# Patient Record
Sex: FEMALE | Race: WHITE | NOT HISPANIC OR LATINO | Employment: OTHER | URBAN - METROPOLITAN AREA
[De-identification: names, ages, dates, MRNs, and addresses within clinical notes are randomized per-mention and may not be internally consistent; named-entity substitution may affect disease eponyms.]

---

## 2023-03-01 LAB
ALANINE AMINOTRANSFERASE (SGPT) (U/L) IN SER/PLAS: 25 U/L (ref 7–45)
ALBUMIN (G/DL) IN SER/PLAS: 4.2 G/DL (ref 3.4–5)
ALKALINE PHOSPHATASE (U/L) IN SER/PLAS: 35 U/L (ref 33–136)
ANION GAP IN SER/PLAS: 14 MMOL/L (ref 10–20)
ASPARTATE AMINOTRANSFERASE (SGOT) (U/L) IN SER/PLAS: 23 U/L (ref 9–39)
BILIRUBIN TOTAL (MG/DL) IN SER/PLAS: 0.4 MG/DL (ref 0–1.2)
CALCIUM (MG/DL) IN SER/PLAS: 9.8 MG/DL (ref 8.6–10.6)
CARBON DIOXIDE, TOTAL (MMOL/L) IN SER/PLAS: 30 MMOL/L (ref 21–32)
CHLORIDE (MMOL/L) IN SER/PLAS: 104 MMOL/L (ref 98–107)
CHOLESTEROL (MG/DL) IN SER/PLAS: 125 MG/DL (ref 0–199)
CHOLESTEROL IN HDL (MG/DL) IN SER/PLAS: 49.4 MG/DL
CHOLESTEROL/HDL RATIO: 2.5
CREATININE (MG/DL) IN SER/PLAS: 1.48 MG/DL (ref 0.5–1.05)
ESTIMATED AVERAGE GLUCOSE FOR HBA1C: 114 MG/DL
GFR FEMALE: 37 ML/MIN/1.73M2
GLUCOSE (MG/DL) IN SER/PLAS: 96 MG/DL (ref 74–99)
HEMOGLOBIN A1C/HEMOGLOBIN TOTAL IN BLOOD: 5.6 %
LDL: 51 MG/DL (ref 0–99)
POTASSIUM (MMOL/L) IN SER/PLAS: 4 MMOL/L (ref 3.5–5.3)
PROTEIN TOTAL: 6.7 G/DL (ref 6.4–8.2)
SODIUM (MMOL/L) IN SER/PLAS: 144 MMOL/L (ref 136–145)
THYROTROPIN (MIU/L) IN SER/PLAS BY DETECTION LIMIT <= 0.05 MIU/L: 0.1 MIU/L (ref 0.44–3.98)
THYROXINE (T4) FREE (NG/DL) IN SER/PLAS: 1.57 NG/DL (ref 0.78–1.48)
TRIGLYCERIDE (MG/DL) IN SER/PLAS: 121 MG/DL (ref 0–149)
UREA NITROGEN (MG/DL) IN SER/PLAS: 21 MG/DL (ref 6–23)
VLDL: 24 MG/DL (ref 0–40)

## 2023-04-04 ENCOUNTER — TELEPHONE (OUTPATIENT)
Dept: PRIMARY CARE | Facility: CLINIC | Age: 73
End: 2023-04-04
Payer: MEDICARE

## 2023-04-04 PROBLEM — Z85.850 HISTORY OF THYROID CANCER: Status: ACTIVE | Noted: 2023-04-04

## 2023-04-04 PROBLEM — F41.9 ANXIETY: Status: ACTIVE | Noted: 2023-04-04

## 2023-04-04 PROBLEM — L03.213 PRESEPTAL CELLULITIS OF LEFT LOWER EYELID: Status: ACTIVE | Noted: 2023-04-04

## 2023-04-04 PROBLEM — H52.4 HYPEROPIA WITH PRESBYOPIA OF BOTH EYES: Status: ACTIVE | Noted: 2023-04-04

## 2023-04-04 PROBLEM — N95.1 HOT FLASH, MENOPAUSAL: Status: ACTIVE | Noted: 2023-04-04

## 2023-04-04 PROBLEM — Z86.010 HISTORY OF COLONIC POLYPS: Status: ACTIVE | Noted: 2023-04-04

## 2023-04-04 PROBLEM — F32.5 DEPRESSION, MAJOR, IN REMISSION (CMS-HCC): Status: ACTIVE | Noted: 2023-04-04

## 2023-04-04 PROBLEM — H52.4 MYOPIA WITH PRESBYOPIA: Status: ACTIVE | Noted: 2023-04-04

## 2023-04-04 PROBLEM — R05.9 COUGH: Status: ACTIVE | Noted: 2023-04-04

## 2023-04-04 PROBLEM — H61.23 IMPACTED CERUMEN OF BOTH EARS: Status: ACTIVE | Noted: 2023-04-04

## 2023-04-04 PROBLEM — I10 BENIGN ESSENTIAL HYPERTENSION: Status: ACTIVE | Noted: 2023-04-04

## 2023-04-04 PROBLEM — N95.2 VAGINAL ATROPHY: Status: ACTIVE | Noted: 2023-04-04

## 2023-04-04 PROBLEM — F32.A DEPRESSION: Status: ACTIVE | Noted: 2023-04-04

## 2023-04-04 PROBLEM — N18.30 CKD (CHRONIC KIDNEY DISEASE), STAGE III (MULTI): Status: ACTIVE | Noted: 2023-04-04

## 2023-04-04 PROBLEM — M85.80 OSTEOPENIA: Status: ACTIVE | Noted: 2023-04-04

## 2023-04-04 PROBLEM — I25.10 CORONARY ATHEROSCLEROSIS: Status: ACTIVE | Noted: 2023-04-04

## 2023-04-04 PROBLEM — E66.811 CLASS 1 OBESITY WITH BODY MASS INDEX (BMI) OF 30.0 TO 30.9 IN ADULT: Status: ACTIVE | Noted: 2023-04-04

## 2023-04-04 PROBLEM — H01.009 BLEPHARITIS: Status: ACTIVE | Noted: 2023-04-04

## 2023-04-04 PROBLEM — L08.9 INFECTED FINGER: Status: ACTIVE | Noted: 2023-04-04

## 2023-04-04 PROBLEM — E78.00 HYPERCHOLESTEROLEMIA: Status: ACTIVE | Noted: 2023-04-04

## 2023-04-04 PROBLEM — H52.4 HYPEROPIA WITH ASTIGMATISM AND PRESBYOPIA: Status: ACTIVE | Noted: 2023-04-04

## 2023-04-04 PROBLEM — L03.213 PRESEPTAL CELLULITIS OF LEFT UPPER EYELID: Status: ACTIVE | Noted: 2023-04-04

## 2023-04-04 PROBLEM — H52.203 HYPEROPIA OF BOTH EYES WITH ASTIGMATISM AND PRESBYOPIA: Status: ACTIVE | Noted: 2023-04-04

## 2023-04-04 PROBLEM — D24.1 INTRADUCTAL PAPILLOMA OF RIGHT BREAST: Status: ACTIVE | Noted: 2023-04-04

## 2023-04-04 PROBLEM — C50.912 CARCINOMA OF LEFT BREAST METASTATIC TO AXILLARY LYMPH NODE (MULTI): Status: ACTIVE | Noted: 2023-04-04

## 2023-04-04 PROBLEM — Z86.0100 HISTORY OF COLONIC POLYPS: Status: ACTIVE | Noted: 2023-04-04

## 2023-04-04 PROBLEM — N28.9 RENAL INSUFFICIENCY: Status: ACTIVE | Noted: 2023-04-04

## 2023-04-04 PROBLEM — E66.9 CLASS 1 OBESITY WITH BODY MASS INDEX (BMI) OF 30.0 TO 30.9 IN ADULT: Status: ACTIVE | Noted: 2023-04-04

## 2023-04-04 PROBLEM — C54.1 ENDOMETRIAL CANCER (MULTI): Status: ACTIVE | Noted: 2023-04-04

## 2023-04-04 PROBLEM — H52.209 HYPEROPIA WITH ASTIGMATISM AND PRESBYOPIA: Status: ACTIVE | Noted: 2023-04-04

## 2023-04-04 PROBLEM — H35.369 PERIPHERAL DRUSEN: Status: ACTIVE | Noted: 2023-04-04

## 2023-04-04 PROBLEM — H52.4 HYPEROPIA OF BOTH EYES WITH ASTIGMATISM AND PRESBYOPIA: Status: ACTIVE | Noted: 2023-04-04

## 2023-04-04 PROBLEM — N89.8 VAGINAL DISCHARGE: Status: ACTIVE | Noted: 2023-04-04

## 2023-04-04 PROBLEM — R30.0 DYSURIA: Status: ACTIVE | Noted: 2023-04-04

## 2023-04-04 PROBLEM — R07.9 CHEST PAIN: Status: ACTIVE | Noted: 2023-04-04

## 2023-04-04 PROBLEM — G43.909 MIGRAINE HEADACHE: Status: ACTIVE | Noted: 2023-04-04

## 2023-04-04 PROBLEM — K21.9 GERD (GASTROESOPHAGEAL REFLUX DISEASE): Status: ACTIVE | Noted: 2023-04-04

## 2023-04-04 PROBLEM — H52.03 HYPEROPIA OF BOTH EYES WITH ASTIGMATISM AND PRESBYOPIA: Status: ACTIVE | Noted: 2023-04-04

## 2023-04-04 PROBLEM — H25.13 NUCLEAR SCLEROSIS OF BOTH EYES: Status: ACTIVE | Noted: 2023-04-04

## 2023-04-04 PROBLEM — G89.29 CHRONIC PAIN: Status: ACTIVE | Noted: 2023-04-04

## 2023-04-04 PROBLEM — H61.20 CERUMEN IMPACTION: Status: ACTIVE | Noted: 2023-04-04

## 2023-04-04 PROBLEM — H52.00 HYPEROPIA WITH ASTIGMATISM AND PRESBYOPIA: Status: ACTIVE | Noted: 2023-04-04

## 2023-04-04 PROBLEM — S29.9XXA RIB INJURY: Status: ACTIVE | Noted: 2023-04-04

## 2023-04-04 PROBLEM — L03.90 CELLULITIS: Status: ACTIVE | Noted: 2023-04-04

## 2023-04-04 PROBLEM — H16.149 SPK (SUPERFICIAL PUNCTATE KERATITIS): Status: ACTIVE | Noted: 2023-04-04

## 2023-04-04 PROBLEM — C77.3 CARCINOMA OF LEFT BREAST METASTATIC TO AXILLARY LYMPH NODE (MULTI): Status: ACTIVE | Noted: 2023-04-04

## 2023-04-04 PROBLEM — B00.9 HERPES SIMPLEX: Status: ACTIVE | Noted: 2023-04-04

## 2023-04-04 PROBLEM — G47.00 INSOMNIA: Status: ACTIVE | Noted: 2023-04-04

## 2023-04-04 PROBLEM — R55 SYNCOPE: Status: ACTIVE | Noted: 2023-04-04

## 2023-04-04 PROBLEM — J18.9 PNEUMONIA: Status: ACTIVE | Noted: 2023-04-04

## 2023-04-04 PROBLEM — R53.83 FATIGUE: Status: ACTIVE | Noted: 2023-04-04

## 2023-04-04 PROBLEM — H35.361 DRUSEN (DEGENERATIVE) OF MACULA, RIGHT EYE: Status: ACTIVE | Noted: 2023-04-04

## 2023-04-04 PROBLEM — Z85.42 HISTORY OF ENDOMETRIAL CANCER: Status: ACTIVE | Noted: 2023-04-04

## 2023-04-04 PROBLEM — H52.03 HYPEROPIA WITH PRESBYOPIA OF BOTH EYES: Status: ACTIVE | Noted: 2023-04-04

## 2023-04-04 PROBLEM — H52.10 MYOPIA WITH PRESBYOPIA: Status: ACTIVE | Noted: 2023-04-04

## 2023-04-04 PROBLEM — E03.9 HYPOTHYROIDISM: Status: ACTIVE | Noted: 2023-04-04

## 2023-04-04 PROBLEM — N82.4 COLOVAGINAL FISTULA: Status: ACTIVE | Noted: 2023-04-04

## 2023-04-04 PROBLEM — H00.026 INTERNAL HORDEOLUM OF LEFT EYE: Status: ACTIVE | Noted: 2023-04-04

## 2023-04-04 PROBLEM — L85.3 DRY SKIN: Status: ACTIVE | Noted: 2023-04-04

## 2023-04-04 PROBLEM — E55.9 VITAMIN D DEFICIENCY: Status: ACTIVE | Noted: 2023-04-04

## 2023-04-04 PROBLEM — R41.3 MEMORY LOSS: Status: ACTIVE | Noted: 2023-04-04

## 2023-04-04 RX ORDER — VENLAFAXINE HYDROCHLORIDE 75 MG/1
1 CAPSULE, EXTENDED RELEASE ORAL DAILY
COMMUNITY
Start: 2023-02-21 | End: 2024-03-08 | Stop reason: SDUPTHER

## 2023-04-04 RX ORDER — CHOLECALCIFEROL (VITAMIN D3) 125 MCG
1 CAPSULE ORAL DAILY
COMMUNITY

## 2023-04-04 RX ORDER — ALPRAZOLAM 0.25 MG/1
TABLET ORAL
COMMUNITY
Start: 2021-02-03 | End: 2023-10-30 | Stop reason: ALTCHOICE

## 2023-04-04 RX ORDER — OMEPRAZOLE 20 MG/1
1 CAPSULE, DELAYED RELEASE ORAL DAILY
COMMUNITY
Start: 2013-05-28 | End: 2023-12-04 | Stop reason: SDUPTHER

## 2023-04-04 RX ORDER — FLUOROURACIL 50 MG/G
CREAM TOPICAL 2 TIMES DAILY
COMMUNITY
Start: 2019-03-08 | End: 2024-03-27 | Stop reason: ALTCHOICE

## 2023-04-04 RX ORDER — VALSARTAN AND HYDROCHLOROTHIAZIDE 160; 12.5 MG/1; MG/1
0.5 TABLET, FILM COATED ORAL DAILY
COMMUNITY
Start: 2017-08-09 | End: 2023-10-30 | Stop reason: ALTCHOICE

## 2023-04-04 RX ORDER — CALCIUM CARBONATE 600 MG
2 TABLET ORAL DAILY
COMMUNITY
Start: 2019-07-03

## 2023-04-04 RX ORDER — TRAMADOL HYDROCHLORIDE 50 MG/1
1 TABLET ORAL 3 TIMES DAILY PRN
COMMUNITY
Start: 2022-09-15 | End: 2024-03-27 | Stop reason: ALTCHOICE

## 2023-04-04 RX ORDER — ZOLEDRONIC ACID 0.04 MG/ML
INJECTION, SOLUTION INTRAVENOUS
COMMUNITY
Start: 2021-01-22 | End: 2024-03-05 | Stop reason: WASHOUT

## 2023-04-04 RX ORDER — ZINC GLUCONATE 50 MG
1 TABLET ORAL DAILY
COMMUNITY

## 2023-04-04 RX ORDER — LEVOTHYROXINE SODIUM 112 UG/1
1 TABLET ORAL DAILY
COMMUNITY
Start: 2023-03-31 | End: 2023-06-30 | Stop reason: SDUPTHER

## 2023-04-04 RX ORDER — POTASSIUM CHLORIDE 750 MG/1
1 CAPSULE, EXTENDED RELEASE ORAL DAILY
COMMUNITY
Start: 2022-06-23 | End: 2023-12-01

## 2023-04-04 RX ORDER — MAGNESIUM GLUCONATE 27.5 (500)
1 TABLET ORAL DAILY
COMMUNITY
Start: 2021-02-03

## 2023-04-04 RX ORDER — FLUOCINOLONE ACETONIDE 0.11 MG/ML
OIL TOPICAL 2 TIMES DAILY
COMMUNITY
Start: 2015-02-23 | End: 2024-03-27 | Stop reason: ALTCHOICE

## 2023-04-04 RX ORDER — SEMAGLUTIDE 1.34 MG/ML
INJECTION, SOLUTION SUBCUTANEOUS
COMMUNITY
End: 2024-03-27 | Stop reason: ALTCHOICE

## 2023-04-04 RX ORDER — ROSUVASTATIN CALCIUM 40 MG/1
1 TABLET, COATED ORAL NIGHTLY
COMMUNITY
Start: 2019-10-14 | End: 2024-05-07 | Stop reason: SDUPTHER

## 2023-04-04 RX ORDER — ASPIRIN 81 MG/1
1 TABLET ORAL DAILY
COMMUNITY
End: 2023-11-08 | Stop reason: ALTCHOICE

## 2023-04-04 RX ORDER — TAMOXIFEN CITRATE 20 MG/1
1 TABLET ORAL DAILY
COMMUNITY
End: 2023-10-30 | Stop reason: SDUPTHER

## 2023-04-04 RX ORDER — MULTIVIT-MIN/IRON/FOLIC ACID/K 18-600-40
1 CAPSULE ORAL 2 TIMES DAILY
COMMUNITY
Start: 2021-02-03

## 2023-04-04 RX ORDER — PERPHENAZINE 2 MG
1 TABLET ORAL DAILY
COMMUNITY
Start: 2019-12-06 | End: 2023-10-30 | Stop reason: ALTCHOICE

## 2023-04-04 NOTE — TELEPHONE ENCOUNTER
Dizziness which seemed to start when she started Ozempic    She stopped Ozempic 5 days ago and is not doing any better  Also BP meds were decreased  as BP was low but this do did not seem to make a difference  Will schedule an appointment  to evaluate

## 2023-04-04 NOTE — TELEPHONE ENCOUNTER
Pt was on Ozempic and was having side effects so she stopped it and wants to discuss with you. Please call

## 2023-04-04 NOTE — PROGRESS NOTES
Subjective   Patient ID: Annabel Cottrell is a 72 y.o. female.    HPI  Patient presents today in follow-up of dizziness and lightheadedness  She is 72 years old and has multiple medical issues  Metastatic breast cancer  History of thyroid cancer with subsequent hypothyroidism  History of endometrial cancer  Chronic kidney disease  Hypertension  Hypercholesteremia  Anxiety  She had  really been struggling with her weight and was very frustrated with this.  Started Ozempic and she has had some success with the Ozempic  However since starting the Ozempic she had felt somewhat lightheaded and seems it is getting worse it does seem to be somewhat vertiginous in nature with the room spinning  She also was concerned that her blood pressure was too low as her blood pressure had dropped after losing weight.  Dr. Coulter made some adjustments with her medications  She stopped amlodipine and taking 1/2 of valsartan at this time  She also stopped the Ozempic  She has been vomiting intermittently but in the last 2 weeks almost daily since stopping the Ozempic this has improved slightly but did vomit yesterday  She has not noted much of a change in how she is feeling since stopping Ozempic or with the blood pressure medication changes  She has lost 21 lbs on the Ozempic  It is positional in nature            Review of Systems  As above she has lost 21 pounds since starting the Ozempic in December    Objective   Physical Exam  Well-developed age-appropriate no acute distress  HEENT exam is really unremarkable however with turning her head left and right the symptoms of vertigo present also when she is up from lying down to sitting position her symptoms are also reproduced  Otherwise cranial nerves II through XII are intact without deficits noted  Lungs are clear  Cardiovascular regular rate and rhythm  Abdomen is soft and nontender bowel sounds are positive    Assessment/Plan   #1 dizziness which I think is a combination of the  orthostasis as well as vertigo.  Her blood pressure is low and definitely changes with positional changes she is currently only taking the valsartan her blood pressure at home has been right around 100 systolic as well at this time we will stop the valsartan.  This is the only thing she is taking for blood pressure.  We will obtain blood work including comprehensive metabolic profile and CBC  Increase fluid intake  2.  Vertigo she does have symptoms of positional vertigo referral to physical therapy able to get her in within the next week which is excellent  3.  Nausea vomiting seems to be improving since she stopped the Ozempic I think it really may be the Ozempic she has had GI issues throughout with Ozempic but really so wanted to lose weight that she sided to tolerate the side effects currently her weight is fine and I do not want her to take the Ozempic any longer  4.  Chronic kidney disease kidney functions creatinine was slightly higher than baseline at last value in early March we will recheck today  30 minutes were spent with patient of which greater than 50% was spent in counseling and coordination of care   This note was partially generated using the Dragon voice recognition system.  There may be some incorrect wording ,grammar, spelling or punctuation errors that were not corrected prior to committing the note to the medical record.      Lab on 04/05/2023   Component Date Value Ref Range Status    Glucose 04/05/2023 99  74 - 99 mg/dL Final    Sodium 04/05/2023 137  136 - 145 mmol/L Final    Potassium 04/05/2023 3.8  3.5 - 5.3 mmol/L Final    Chloride 04/05/2023 99  98 - 107 mmol/L Final    Bicarbonate 04/05/2023 27  21 - 32 mmol/L Final    Anion Gap 04/05/2023 15  10 - 20 mmol/L Final    Urea Nitrogen 04/05/2023 16  6 - 23 mg/dL Final    Creatinine 04/05/2023 1.34 (H)  0.50 - 1.05 mg/dL Final    GFR Female 04/05/2023 42 (A)  >90 mL/min/1.73m2 Final     CALCULATIONS OF ESTIMATED GFR ARE PERFORMED   USING  THE 2021 CKD-EPI STUDY REFIT EQUATION   WITHOUT THE RACE VARIABLE FOR THE IDMS-TRACEABLE   CREATININE METHODS.    https://jasn.asnjournals.org/content/early/2021/09/22/[email or web address]    Calcium 04/05/2023 9.0  8.6 - 10.3 mg/dL Final    Albumin 04/05/2023 4.3  3.4 - 5.0 g/dL Final    Alkaline Phosphatase 04/05/2023 30 (L)  33 - 136 U/L Final    Total Protein 04/05/2023 6.9  6.4 - 8.2 g/dL Final    AST 04/05/2023 25  9 - 39 U/L Final    Total Bilirubin 04/05/2023 0.4  0.0 - 1.2 mg/dL Final    ALT (SGPT) 04/05/2023 22  7 - 45 U/L Final     Patients treated with Sulfasalazine may generate    falsely decreased results for ALT.    WBC 04/05/2023 7.8  4.4 - 11.3 x10E9/L Final    RBC 04/05/2023 4.40  4.00 - 5.20 x10E12/L Final    Hemoglobin 04/05/2023 13.2  12.0 - 16.0 g/dL Final    Hematocrit 04/05/2023 39.5  36.0 - 46.0 % Final    MCV 04/05/2023 90  80 - 100 fL Final    MCHC 04/05/2023 33.4  32.0 - 36.0 g/dL Final    Platelets 04/05/2023 256  150 - 450 x10E9/L Final    RDW 04/05/2023 13.0  11.5 - 14.5 % Final

## 2023-04-05 ENCOUNTER — LAB (OUTPATIENT)
Dept: LAB | Facility: LAB | Age: 73
End: 2023-04-05
Payer: MEDICARE

## 2023-04-05 ENCOUNTER — OFFICE VISIT (OUTPATIENT)
Dept: PRIMARY CARE | Facility: CLINIC | Age: 73
End: 2023-04-05
Payer: MEDICARE

## 2023-04-05 VITALS
DIASTOLIC BLOOD PRESSURE: 64 MMHG | SYSTOLIC BLOOD PRESSURE: 94 MMHG | OXYGEN SATURATION: 96 % | WEIGHT: 144 LBS | HEART RATE: 87 BPM | BODY MASS INDEX: 26.34 KG/M2

## 2023-04-05 DIAGNOSIS — R42 VERTIGO: ICD-10-CM

## 2023-04-05 DIAGNOSIS — R11.0 NAUSEA: ICD-10-CM

## 2023-04-05 DIAGNOSIS — I10 BENIGN ESSENTIAL HYPERTENSION: ICD-10-CM

## 2023-04-05 DIAGNOSIS — N18.31 STAGE 3A CHRONIC KIDNEY DISEASE (MULTI): ICD-10-CM

## 2023-04-05 DIAGNOSIS — I95.1 ORTHOSTASIS: ICD-10-CM

## 2023-04-05 DIAGNOSIS — I95.1 ORTHOSTASIS: Primary | ICD-10-CM

## 2023-04-05 PROBLEM — H52.209 HYPEROPIA WITH ASTIGMATISM AND PRESBYOPIA: Status: RESOLVED | Noted: 2023-04-04 | Resolved: 2023-04-05

## 2023-04-05 PROBLEM — H52.4 HYPEROPIA WITH PRESBYOPIA OF BOTH EYES: Status: RESOLVED | Noted: 2023-04-04 | Resolved: 2023-04-05

## 2023-04-05 PROBLEM — H52.03 HYPEROPIA WITH PRESBYOPIA OF BOTH EYES: Status: RESOLVED | Noted: 2023-04-04 | Resolved: 2023-04-05

## 2023-04-05 PROBLEM — N89.8 VAGINAL DISCHARGE: Status: RESOLVED | Noted: 2023-04-04 | Resolved: 2023-04-05

## 2023-04-05 PROBLEM — D24.1 INTRADUCTAL PAPILLOMA OF RIGHT BREAST: Status: RESOLVED | Noted: 2023-04-04 | Resolved: 2023-04-05

## 2023-04-05 PROBLEM — F41.9 ANXIETY: Status: RESOLVED | Noted: 2023-04-04 | Resolved: 2023-04-05

## 2023-04-05 PROBLEM — L03.213 PRESEPTAL CELLULITIS OF LEFT LOWER EYELID: Status: RESOLVED | Noted: 2023-04-04 | Resolved: 2023-04-05

## 2023-04-05 PROBLEM — L08.9 INFECTED FINGER: Status: RESOLVED | Noted: 2023-04-04 | Resolved: 2023-04-05

## 2023-04-05 PROBLEM — H52.03 HYPEROPIA OF BOTH EYES WITH ASTIGMATISM AND PRESBYOPIA: Status: RESOLVED | Noted: 2023-04-04 | Resolved: 2023-04-05

## 2023-04-05 PROBLEM — L03.90 CELLULITIS: Status: RESOLVED | Noted: 2023-04-04 | Resolved: 2023-04-05

## 2023-04-05 PROBLEM — N95.2 VAGINAL ATROPHY: Status: RESOLVED | Noted: 2023-04-04 | Resolved: 2023-04-05

## 2023-04-05 PROBLEM — H52.4 HYPEROPIA OF BOTH EYES WITH ASTIGMATISM AND PRESBYOPIA: Status: RESOLVED | Noted: 2023-04-04 | Resolved: 2023-04-05

## 2023-04-05 PROBLEM — G89.29 CHRONIC PAIN: Status: RESOLVED | Noted: 2023-04-04 | Resolved: 2023-04-05

## 2023-04-05 PROBLEM — R41.3 MEMORY LOSS: Status: RESOLVED | Noted: 2023-04-04 | Resolved: 2023-04-05

## 2023-04-05 PROBLEM — H52.4 MYOPIA WITH PRESBYOPIA: Status: RESOLVED | Noted: 2023-04-04 | Resolved: 2023-04-05

## 2023-04-05 PROBLEM — N28.9 RENAL INSUFFICIENCY: Status: RESOLVED | Noted: 2023-04-04 | Resolved: 2023-04-05

## 2023-04-05 PROBLEM — H52.203 HYPEROPIA OF BOTH EYES WITH ASTIGMATISM AND PRESBYOPIA: Status: RESOLVED | Noted: 2023-04-04 | Resolved: 2023-04-05

## 2023-04-05 PROBLEM — H61.20 CERUMEN IMPACTION: Status: RESOLVED | Noted: 2023-04-04 | Resolved: 2023-04-05

## 2023-04-05 PROBLEM — H16.149 SPK (SUPERFICIAL PUNCTATE KERATITIS): Status: RESOLVED | Noted: 2023-04-04 | Resolved: 2023-04-05

## 2023-04-05 PROBLEM — L85.3 DRY SKIN: Status: RESOLVED | Noted: 2023-04-04 | Resolved: 2023-04-05

## 2023-04-05 PROBLEM — H52.4 HYPEROPIA WITH ASTIGMATISM AND PRESBYOPIA: Status: RESOLVED | Noted: 2023-04-04 | Resolved: 2023-04-05

## 2023-04-05 PROBLEM — S29.9XXA RIB INJURY: Status: RESOLVED | Noted: 2023-04-04 | Resolved: 2023-04-05

## 2023-04-05 PROBLEM — H52.10 MYOPIA WITH PRESBYOPIA: Status: RESOLVED | Noted: 2023-04-04 | Resolved: 2023-04-05

## 2023-04-05 PROBLEM — R55 SYNCOPE: Status: RESOLVED | Noted: 2023-04-04 | Resolved: 2023-04-05

## 2023-04-05 PROBLEM — F41.8 OTHER SPECIFIED ANXIETY DISORDERS: Status: ACTIVE | Noted: 2020-08-06

## 2023-04-05 PROBLEM — L03.213 PRESEPTAL CELLULITIS OF LEFT UPPER EYELID: Status: RESOLVED | Noted: 2023-04-04 | Resolved: 2023-04-05

## 2023-04-05 PROBLEM — J18.9 PNEUMONIA: Status: RESOLVED | Noted: 2023-04-04 | Resolved: 2023-04-05

## 2023-04-05 PROBLEM — H01.009 BLEPHARITIS: Status: RESOLVED | Noted: 2023-04-04 | Resolved: 2023-04-05

## 2023-04-05 PROBLEM — E55.9 VITAMIN D DEFICIENCY: Status: RESOLVED | Noted: 2023-04-04 | Resolved: 2023-04-05

## 2023-04-05 PROBLEM — R07.9 CHEST PAIN: Status: RESOLVED | Noted: 2023-04-04 | Resolved: 2023-04-05

## 2023-04-05 PROBLEM — H52.00 HYPEROPIA WITH ASTIGMATISM AND PRESBYOPIA: Status: RESOLVED | Noted: 2023-04-04 | Resolved: 2023-04-05

## 2023-04-05 PROBLEM — C54.1 ENDOMETRIAL CANCER (MULTI): Status: RESOLVED | Noted: 2023-04-04 | Resolved: 2023-04-05

## 2023-04-05 PROBLEM — H61.23 IMPACTED CERUMEN OF BOTH EARS: Status: RESOLVED | Noted: 2023-04-04 | Resolved: 2023-04-05

## 2023-04-05 PROBLEM — H25.13 NUCLEAR SCLEROSIS OF BOTH EYES: Status: RESOLVED | Noted: 2023-04-04 | Resolved: 2023-04-05

## 2023-04-05 PROBLEM — R30.0 DYSURIA: Status: RESOLVED | Noted: 2023-04-04 | Resolved: 2023-04-05

## 2023-04-05 PROBLEM — H35.369 PERIPHERAL DRUSEN: Status: RESOLVED | Noted: 2023-04-04 | Resolved: 2023-04-05

## 2023-04-05 PROBLEM — R05.9 COUGH: Status: RESOLVED | Noted: 2023-04-04 | Resolved: 2023-04-05

## 2023-04-05 LAB
ALANINE AMINOTRANSFERASE (SGPT) (U/L) IN SER/PLAS: 22 U/L (ref 7–45)
ALBUMIN (G/DL) IN SER/PLAS: 4.3 G/DL (ref 3.4–5)
ALKALINE PHOSPHATASE (U/L) IN SER/PLAS: 30 U/L (ref 33–136)
ANION GAP IN SER/PLAS: 15 MMOL/L (ref 10–20)
ASPARTATE AMINOTRANSFERASE (SGOT) (U/L) IN SER/PLAS: 25 U/L (ref 9–39)
BILIRUBIN TOTAL (MG/DL) IN SER/PLAS: 0.4 MG/DL (ref 0–1.2)
CALCIUM (MG/DL) IN SER/PLAS: 9 MG/DL (ref 8.6–10.3)
CARBON DIOXIDE, TOTAL (MMOL/L) IN SER/PLAS: 27 MMOL/L (ref 21–32)
CHLORIDE (MMOL/L) IN SER/PLAS: 99 MMOL/L (ref 98–107)
CREATININE (MG/DL) IN SER/PLAS: 1.34 MG/DL (ref 0.5–1.05)
ERYTHROCYTE DISTRIBUTION WIDTH (RATIO) BY AUTOMATED COUNT: 13 % (ref 11.5–14.5)
ERYTHROCYTE MEAN CORPUSCULAR HEMOGLOBIN CONCENTRATION (G/DL) BY AUTOMATED: 33.4 G/DL (ref 32–36)
ERYTHROCYTE MEAN CORPUSCULAR VOLUME (FL) BY AUTOMATED COUNT: 90 FL (ref 80–100)
ERYTHROCYTES (10*6/UL) IN BLOOD BY AUTOMATED COUNT: 4.4 X10E12/L (ref 4–5.2)
GFR FEMALE: 42 ML/MIN/1.73M2
GLUCOSE (MG/DL) IN SER/PLAS: 99 MG/DL (ref 74–99)
HEMATOCRIT (%) IN BLOOD BY AUTOMATED COUNT: 39.5 % (ref 36–46)
HEMOGLOBIN (G/DL) IN BLOOD: 13.2 G/DL (ref 12–16)
LEUKOCYTES (10*3/UL) IN BLOOD BY AUTOMATED COUNT: 7.8 X10E9/L (ref 4.4–11.3)
PLATELETS (10*3/UL) IN BLOOD AUTOMATED COUNT: 256 X10E9/L (ref 150–450)
POTASSIUM (MMOL/L) IN SER/PLAS: 3.8 MMOL/L (ref 3.5–5.3)
PROTEIN TOTAL: 6.9 G/DL (ref 6.4–8.2)
SODIUM (MMOL/L) IN SER/PLAS: 137 MMOL/L (ref 136–145)
UREA NITROGEN (MG/DL) IN SER/PLAS: 16 MG/DL (ref 6–23)

## 2023-04-05 PROCEDURE — 3074F SYST BP LT 130 MM HG: CPT | Performed by: INTERNAL MEDICINE

## 2023-04-05 PROCEDURE — 85027 COMPLETE CBC AUTOMATED: CPT

## 2023-04-05 PROCEDURE — 99214 OFFICE O/P EST MOD 30 MIN: CPT | Performed by: INTERNAL MEDICINE

## 2023-04-05 PROCEDURE — 1159F MED LIST DOCD IN RCRD: CPT | Performed by: INTERNAL MEDICINE

## 2023-04-05 PROCEDURE — 80053 COMPREHEN METABOLIC PANEL: CPT

## 2023-04-05 PROCEDURE — 36415 COLL VENOUS BLD VENIPUNCTURE: CPT

## 2023-04-05 PROCEDURE — 3078F DIAST BP <80 MM HG: CPT | Performed by: INTERNAL MEDICINE

## 2023-04-05 RX ORDER — NAPROXEN SODIUM 220 MG/1
TABLET, FILM COATED ORAL
COMMUNITY
Start: 2020-08-15 | End: 2023-10-30 | Stop reason: ALTCHOICE

## 2023-04-05 RX ORDER — LEVOTHYROXINE SODIUM 100 UG/1
TABLET ORAL
COMMUNITY
End: 2023-06-30 | Stop reason: ALTCHOICE

## 2023-04-05 RX ORDER — DULOXETIN HYDROCHLORIDE 60 MG/1
CAPSULE, DELAYED RELEASE ORAL
COMMUNITY
End: 2023-10-30 | Stop reason: ALTCHOICE

## 2023-04-05 RX ORDER — AMLODIPINE BESYLATE 2.5 MG/1
TABLET ORAL
COMMUNITY
End: 2023-04-05

## 2023-04-05 NOTE — PATIENT INSTRUCTIONS
I think your dizziness may be from a combination of things.  Your blood pressure is low and it does drop when you stand up.(This is called orthostasis)  I would like you to stop the valsartan/hydrochlorothiazide for now.  Please be sure you are drinking plenty of fluids.  64 ounces of water daily is recommended  We are going to check some blood work for serum electrolytes kidney functions and blood count today  I do also think you have dents for positional vertigo and for this I would like you to see physical therapy this referral has been placed  The [facility] as well

## 2023-04-11 ENCOUNTER — TELEPHONE (OUTPATIENT)
Dept: PRIMARY CARE | Facility: CLINIC | Age: 73
End: 2023-04-11
Payer: MEDICARE

## 2023-04-11 DIAGNOSIS — I10 BENIGN ESSENTIAL HYPERTENSION: Primary | ICD-10-CM

## 2023-04-11 RX ORDER — VALSARTAN 40 MG/1
40 TABLET ORAL DAILY
Qty: 30 TABLET | Refills: 11 | Status: SHIPPED | OUTPATIENT
Start: 2023-04-11 | End: 2023-05-31 | Stop reason: SDUPTHER

## 2023-04-11 NOTE — TELEPHONE ENCOUNTER
Pt is callign in regards to BP - Laying down this morning - 153/90 - Sitting 136/89 - standing - 123/88 - Pt states she is having headache since yesterday -  SUHA

## 2023-04-11 NOTE — PROGRESS NOTES
Blood pressure has been elevated we are going to restart valsartan but at a lower dose and without the diuretic as she was orthostatic we will start 40 mg daily and continue to monitor blood pressure

## 2023-04-14 ENCOUNTER — TELEPHONE (OUTPATIENT)
Dept: PRIMARY CARE | Facility: CLINIC | Age: 73
End: 2023-04-14
Payer: MEDICARE

## 2023-04-14 NOTE — TELEPHONE ENCOUNTER
Pt is calling with BP readings:  Weds am   Standing 105/75 pulse 114  Sitting 120-90 pulse 97  Laying 143/87 pulse 93      Today:  157/96 pulse 84 laying down  141/97 pulse 83 sitting  120/80 pulse 80 standing    She is on Diovan 40mg daily. Please call.  CHOCO/mm

## 2023-05-01 DIAGNOSIS — N39.0 URINARY TRACT INFECTION WITHOUT HEMATURIA, SITE UNSPECIFIED: ICD-10-CM

## 2023-05-01 RX ORDER — NITROFURANTOIN 25; 75 MG/1; MG/1
100 CAPSULE ORAL 2 TIMES DAILY
Qty: 10 CAPSULE | Refills: 0 | Status: SHIPPED | OUTPATIENT
Start: 2023-05-01 | End: 2023-05-06

## 2023-05-08 ENCOUNTER — TELEPHONE (OUTPATIENT)
Dept: PRIMARY CARE | Facility: CLINIC | Age: 73
End: 2023-05-08

## 2023-05-08 ENCOUNTER — OFFICE VISIT (OUTPATIENT)
Dept: PRIMARY CARE | Facility: CLINIC | Age: 73
End: 2023-05-08
Payer: MEDICARE

## 2023-05-08 VITALS — DIASTOLIC BLOOD PRESSURE: 80 MMHG | SYSTOLIC BLOOD PRESSURE: 138 MMHG | TEMPERATURE: 98.1 F

## 2023-05-08 DIAGNOSIS — Z85.42 HISTORY OF ENDOMETRIAL CANCER: ICD-10-CM

## 2023-05-08 DIAGNOSIS — N95.2 ATROPHIC VAGINITIS: Primary | ICD-10-CM

## 2023-05-08 DIAGNOSIS — N39.0 URINARY TRACT INFECTION WITHOUT HEMATURIA, SITE UNSPECIFIED: Primary | ICD-10-CM

## 2023-05-08 DIAGNOSIS — I10 BENIGN ESSENTIAL HYPERTENSION: ICD-10-CM

## 2023-05-08 DIAGNOSIS — E03.9 ACQUIRED HYPOTHYROIDISM: ICD-10-CM

## 2023-05-08 DIAGNOSIS — R39.9 URINARY SYMPTOM OR SIGN: ICD-10-CM

## 2023-05-08 PROCEDURE — 3079F DIAST BP 80-89 MM HG: CPT | Performed by: INTERNAL MEDICINE

## 2023-05-08 PROCEDURE — 1160F RVW MEDS BY RX/DR IN RCRD: CPT | Performed by: INTERNAL MEDICINE

## 2023-05-08 PROCEDURE — 1036F TOBACCO NON-USER: CPT | Performed by: INTERNAL MEDICINE

## 2023-05-08 PROCEDURE — 3075F SYST BP GE 130 - 139MM HG: CPT | Performed by: INTERNAL MEDICINE

## 2023-05-08 PROCEDURE — 99214 OFFICE O/P EST MOD 30 MIN: CPT | Performed by: INTERNAL MEDICINE

## 2023-05-08 PROCEDURE — 1159F MED LIST DOCD IN RCRD: CPT | Performed by: INTERNAL MEDICINE

## 2023-05-08 RX ORDER — CEPHALEXIN 500 MG/1
500 CAPSULE ORAL 3 TIMES DAILY
Qty: 15 CAPSULE | Refills: 0 | Status: SHIPPED | OUTPATIENT
Start: 2023-05-08 | End: 2023-05-13

## 2023-05-08 NOTE — PATIENT INSTRUCTIONS
Since you are so much better from the urinary symptoms as we discussed about 50% I am going to have you just complete the Cipro antibiotic.  If however your symptoms worsen while you are out of town switch the antibiotic to cephalexin 500 mg 3 times daily for 5 days.  I really think some of your symptoms may be from vaginal atrophy.  I feel you could benefit from vaginal estrogen but I am going to check with your oncology nurse practitioner to see if they would be agreeable to us using a small amount of estrogen cream.  The next step would definitely be to follow-up with Dr. Jordan as well

## 2023-05-08 NOTE — TELEPHONE ENCOUNTER
She finished Macrobid and was still having sxs so Sheryl called her in Cipro over the weekend but she is not any better. She is having a lot of pain when she urinates and some pain in lower abdomen near her vagina but no discharge or ordo. She wanted ot leave a urine but told her she couldn't;t because she is on Cipro. She is leaving town Weds what to do?

## 2023-05-08 NOTE — PROGRESS NOTES
Subjective   Patient ID: Annabel Cottrell is a 73 y.o. female.    HPI  She presents today with continued urinary complaints  Feels like a sharp knife pushing up  it hurts when she urinates  We treated empirically with Macrobid as she was a so uncomfortable did not think she could wait to do a urine it was after hours.  She really did not feel much better and her daughter daughter-in-law who are physicians called her in Cipro about 3 days ago she initially did not feel she was doing much better but now thinks she is about 50% improved  She has a history of breast cancer and follows routinely with oncology she did have a history of endometrial cancer status post hysterectomy and did not require any adjuvant therapy  She has seen urogynecology in the past but not for a while      Review of Systems  No fevers no chills  No back pain  No vaginal discharge  She does admit to very dry vagina  Objective   Physical Exam  Well-developed age-appropriate no acute distress  No CVA tenderness no suprapubic tenderness there is vaginal atrophy present    Assessment/Plan   1 dysuria and big pressure and pain certainly could be more from UTI but unfortunately we do not have an actual urine culture right now she is on Cipro she is going out of town in less than 2 days so we do not have time to really obtain a urinalysis and culture off antibiotics she believes she is about 50% improved from what she had been therefore we will just continue the course of Cipro  If her symptoms seem worse to give her a prescription for Keflex or cephalexin 500 mg 3 times daily to take instead just thinking we could be covering a few different organisms however I do wonder if a lot of her symptoms are more from vaginal atrophy she does have significant atrophy present  She has had both a history of breast cancer and endometrial cancer I am going to check with oncology nurse practitioner to see if we are able to use a small amount of vaginal estrogen however  in the meantime I have asked her to use either coconut oil or olive oil for symptomatic relief  #2 hypothyroidism thyroid functions appear to be slightly over replaced I would like to recheck those in 2 to 3 months that she really wants to move very cautiously with her thyroid we are decreasing dose as she has had so many issues with her weight  3.  Hypertension relative control continue current regimen  30 minutes were spent with patient of which greater than 50% was spent in counseling and coordination of care

## 2023-05-23 DIAGNOSIS — R39.9 URINARY SYMPTOM OR SIGN: ICD-10-CM

## 2023-05-24 ENCOUNTER — LAB (OUTPATIENT)
Dept: LAB | Facility: LAB | Age: 73
End: 2023-05-24
Payer: MEDICARE

## 2023-05-24 DIAGNOSIS — R39.9 URINARY SYMPTOM OR SIGN: ICD-10-CM

## 2023-05-24 PROCEDURE — 87086 URINE CULTURE/COLONY COUNT: CPT

## 2023-05-24 PROCEDURE — 81001 URINALYSIS AUTO W/SCOPE: CPT

## 2023-05-25 LAB
BACTERIA, URINE: ABNORMAL /HPF
RBC, URINE: ABNORMAL /HPF (ref 0–5)
SQUAMOUS EPITHELIAL CELLS, URINE: <1 /HPF
WBC, URINE: 1 /HPF (ref 0–5)

## 2023-05-26 LAB — URINE CULTURE: NORMAL

## 2023-05-31 ENCOUNTER — TELEPHONE (OUTPATIENT)
Dept: PRIMARY CARE | Facility: CLINIC | Age: 73
End: 2023-05-31
Payer: MEDICARE

## 2023-05-31 DIAGNOSIS — I10 BENIGN ESSENTIAL HYPERTENSION: ICD-10-CM

## 2023-05-31 RX ORDER — VALSARTAN 40 MG/1
40 TABLET ORAL DAILY
Qty: 90 TABLET | Refills: 3 | Status: SHIPPED | OUTPATIENT
Start: 2023-05-31 | End: 2023-11-08 | Stop reason: ALTCHOICE

## 2023-06-30 ENCOUNTER — TELEPHONE (OUTPATIENT)
Dept: PRIMARY CARE | Facility: CLINIC | Age: 73
End: 2023-06-30
Payer: MEDICARE

## 2023-06-30 DIAGNOSIS — E03.9 ACQUIRED HYPOTHYROIDISM: Primary | ICD-10-CM

## 2023-06-30 RX ORDER — LEVOTHYROXINE SODIUM 112 UG/1
112 TABLET ORAL DAILY
Qty: 90 TABLET | Refills: 3 | Status: SHIPPED | OUTPATIENT
Start: 2023-06-30 | End: 2024-06-29

## 2023-06-30 NOTE — TELEPHONE ENCOUNTER
I spoke with patient and the Mcg is 0.112 for Levothyroxine - SUHA   
soft/no masses palpable/nontender/bowel sounds normal

## 2023-09-28 VITALS — BODY MASS INDEX: 27.22 KG/M2 | WEIGHT: 147.93 LBS | HEIGHT: 62 IN

## 2023-09-30 RX ORDER — FAMOTIDINE 10 MG/ML
20 INJECTION INTRAVENOUS ONCE AS NEEDED
Status: CANCELLED | OUTPATIENT
Start: 2023-10-30

## 2023-09-30 RX ORDER — ALBUTEROL SULFATE 0.83 MG/ML
3 SOLUTION RESPIRATORY (INHALATION) AS NEEDED
Status: CANCELLED | OUTPATIENT
Start: 2023-10-30

## 2023-09-30 RX ORDER — METHYLPREDNISOLONE SODIUM SUCCINATE 40 MG/ML
40 INJECTION INTRAMUSCULAR; INTRAVENOUS AS NEEDED
Status: CANCELLED | OUTPATIENT
Start: 2023-10-30

## 2023-09-30 RX ORDER — EPINEPHRINE 0.3 MG/.3ML
0.3 INJECTION SUBCUTANEOUS EVERY 5 MIN PRN
Status: CANCELLED | OUTPATIENT
Start: 2023-10-30

## 2023-09-30 RX ORDER — DIPHENHYDRAMINE HYDROCHLORIDE 50 MG/ML
50 INJECTION INTRAMUSCULAR; INTRAVENOUS AS NEEDED
Status: CANCELLED | OUTPATIENT
Start: 2023-10-30

## 2023-10-07 ASSESSMENT — EXTERNAL EXAM - LEFT EYE: OS_EXAM: NORMAL

## 2023-10-07 ASSESSMENT — SLIT LAMP EXAM - LIDS
COMMENTS: GOOD POSITION
COMMENTS: GOOD POSITION

## 2023-10-07 ASSESSMENT — EXTERNAL EXAM - RIGHT EYE: OD_EXAM: NORMAL

## 2023-10-07 NOTE — PROGRESS NOTES
"""Continue Artificial tears both eyes two - four times a day .  Continue Gel drops both eyes at "" Assessment/Plan   Diagnoses and all orders for this visit:    Combined form of age-related cataract, right eye  Combined form of age-related cataract, left eye  -Mildly visually significant. Symptoms; Gradual worsening of vision over the past year. Had difficulty seeing faces on soccer field. Harder to read small print. More difficulty seeing small words/numbers on TV. Having more trouble seeing road signs when driving. Glare from bright lights. At this time, vision does not meet criteria for cataract surgery. Monitor for now. F/u 4-6 months to re-evaluate -- recheck refraction, glare/BAT, dilation OCT RNFL, OCT macula. Plan for Lenstar/Pentacam only if cataract(s) visually significant and if patient would like to proceed with cataract surgery.    Macular drusen, right  -Not visually significant at this time. Monitor.   -F/u 4-6 months to re-evaluate -- recheck refraction, glare/BAT, dilation OCT RNFL, OCT macula.    Hyperopia, bilateral  Astigmatism of both eyes, unspecified type  Presbyopia  -New Rx given per patient request. D/w patient that Rx will change following cataract surgery.   -Wears daily disposable contact lens (CL) - may continue.       No history of intraocular surgery/refractive surgery.   No FH of AMD/glaucoma

## 2023-10-09 ENCOUNTER — OFFICE VISIT (OUTPATIENT)
Dept: OPHTHALMOLOGY | Facility: CLINIC | Age: 73
End: 2023-10-09
Payer: MEDICARE

## 2023-10-09 DIAGNOSIS — H35.361 MACULAR DRUSEN, RIGHT: ICD-10-CM

## 2023-10-09 DIAGNOSIS — H52.4 PRESBYOPIA: ICD-10-CM

## 2023-10-09 DIAGNOSIS — H25.812 COMBINED FORM OF AGE-RELATED CATARACT, LEFT EYE: ICD-10-CM

## 2023-10-09 DIAGNOSIS — H25.811 COMBINED FORM OF AGE-RELATED CATARACT, RIGHT EYE: Primary | ICD-10-CM

## 2023-10-09 DIAGNOSIS — H52.203 ASTIGMATISM OF BOTH EYES, UNSPECIFIED TYPE: ICD-10-CM

## 2023-10-09 DIAGNOSIS — H52.03 HYPEROPIA, BILATERAL: ICD-10-CM

## 2023-10-09 PROCEDURE — 92015 DETERMINE REFRACTIVE STATE: CPT | Performed by: OPHTHALMOLOGY

## 2023-10-09 PROCEDURE — 92014 COMPRE OPH EXAM EST PT 1/>: CPT | Performed by: OPHTHALMOLOGY

## 2023-10-09 ASSESSMENT — REFRACTION_MANIFEST
OD_ADD: +2.50
OD_AXIS: 037
OD_AXIS: 040
OS_AXIS: 100
OD_CYLINDER: -0.75
OS_CYLINDER: -1.50
OS_SPHERE: +2.50
OD_SPHERE: +1.00
OD_CYLINDER: -0.75
OS_CYLINDER: -1.50
METHOD_AUTOREFRACTION: 1
OS_SPHERE: +2.50
OD_SPHERE: +0.25
OS_AXIS: 100
OS_ADD: +2.50

## 2023-10-09 ASSESSMENT — REFRACTION_WEARINGRX
OS_ADD: +2.50
OD_SPHERE: +4.75
OD_AXIS: 075
OD_CYLINDER: -0.75
OD_ADD: +2.50
OS_SPHERE: +4.25
OS_AXIS: 125
OS_CYLINDER: -0.75

## 2023-10-09 ASSESSMENT — ENCOUNTER SYMPTOMS
CARDIOVASCULAR NEGATIVE: 0
PSYCHIATRIC NEGATIVE: 0
ALLERGIC/IMMUNOLOGIC NEGATIVE: 0
RESPIRATORY NEGATIVE: 0
CONSTITUTIONAL NEGATIVE: 0
HEMATOLOGIC/LYMPHATIC NEGATIVE: 0
ENDOCRINE NEGATIVE: 0
MUSCULOSKELETAL NEGATIVE: 0
NEUROLOGICAL NEGATIVE: 0
EYES NEGATIVE: 0
GASTROINTESTINAL NEGATIVE: 0

## 2023-10-09 ASSESSMENT — CUP TO DISC RATIO
OS_RATIO: 0.45
OD_RATIO: 0.4

## 2023-10-09 ASSESSMENT — TONOMETRY
OS_IOP_MMHG: 14
IOP_METHOD: GOLDMANN APPLANATION
OD_IOP_MMHG: 14

## 2023-10-09 ASSESSMENT — VISUAL ACUITY
OS_CC: 20/100
OD_BAT_MED: 20/30
OS_BAT_MED: 20/30
METHOD: SNELLEN - LINEAR
OD_CC: 20/200

## 2023-10-18 ENCOUNTER — OFFICE VISIT (OUTPATIENT)
Dept: OPHTHALMOLOGY | Facility: CLINIC | Age: 73
End: 2023-10-18
Payer: MEDICARE

## 2023-10-18 DIAGNOSIS — Z12.11 COLON CANCER SCREENING: ICD-10-CM

## 2023-10-18 DIAGNOSIS — Z86.010 PERSONAL HISTORY OF COLONIC POLYPS: Primary | ICD-10-CM

## 2023-10-18 DIAGNOSIS — H52.4 HYPEROPIA WITH PRESBYOPIA OF BOTH EYES: Primary | ICD-10-CM

## 2023-10-18 DIAGNOSIS — H52.03 HYPEROPIA WITH PRESBYOPIA OF BOTH EYES: Primary | ICD-10-CM

## 2023-10-18 PROCEDURE — FLVLF CONTACT LENS EVALUATION (SP): Performed by: STUDENT IN AN ORGANIZED HEALTH CARE EDUCATION/TRAINING PROGRAM

## 2023-10-18 RX ORDER — SODIUM PICOSULFATE, MAGNESIUM OXIDE, AND ANHYDROUS CITRIC ACID 12; 3.5; 1 G/175ML; G/175ML; MG/175ML
1 LIQUID ORAL SEE ADMIN INSTRUCTIONS
Qty: 175 ML | Refills: 0 | Status: SHIPPED | OUTPATIENT
Start: 2023-10-18 | End: 2024-03-27 | Stop reason: ALTCHOICE

## 2023-10-18 RX ORDER — SODIUM PICOSULFATE, MAGNESIUM OXIDE, AND ANHYDROUS CITRIC ACID 12; 3.5; 1 G/175ML; G/175ML; MG/175ML
1 LIQUID ORAL SEE ADMIN INSTRUCTIONS
Qty: 175 ML | Refills: 0 | Status: SHIPPED | OUTPATIENT
Start: 2023-10-18 | End: 2024-03-05 | Stop reason: WASHOUT

## 2023-10-18 ASSESSMENT — REFRACTION_CURRENTRX
OS_SPHERE: +3.00
OD_SPHERE: +3.50
OS_CYLINDER: -1.25
OS_BRAND: PRECISION 1 TORIC
OS_AXIS: 100
OD_CYLINDER: SPHERE
OS_DIAMETER: 14.5
OD_DIAMETER: 14.2
OD_BRAND: PRECISION 1
OD_BASECURVE: 8.3
OS_BASECURVE: 8.5

## 2023-10-18 ASSESSMENT — ENCOUNTER SYMPTOMS
CONSTITUTIONAL NEGATIVE: 0
EYES NEGATIVE: 0
NEUROLOGICAL NEGATIVE: 0
RESPIRATORY NEGATIVE: 0
CARDIOVASCULAR NEGATIVE: 0
MUSCULOSKELETAL NEGATIVE: 0
PSYCHIATRIC NEGATIVE: 0
ENDOCRINE NEGATIVE: 0
HEMATOLOGIC/LYMPHATIC NEGATIVE: 0
GASTROINTESTINAL NEGATIVE: 0
ALLERGIC/IMMUNOLOGIC NEGATIVE: 0

## 2023-10-18 ASSESSMENT — REFRACTION_MANIFEST
OS_CYLINDER: -1.50
OD_AXIS: 040
OS_SPHERE: +2.50
OD_SPHERE: +1.25
OS_AXIS: 100
OD_CYLINDER: -0.75
OD_ADD: +2.50
OS_ADD: +2.50

## 2023-10-18 ASSESSMENT — REFRACTION_WEARINGRX
OS_ADD: +2.50
OS_CYLINDER: -1.50
OD_AXIS: 040
OS_AXIS: 100
OD_CYLINDER: -0.75
OD_SPHERE: +1.00
OS_SPHERE: +2.50
OD_ADD: +2.50

## 2023-10-18 ASSESSMENT — VISUAL ACUITY
OD_PH_CC+: -2
OS_PH_CC+: +2
OD_CC: 20/400
CORRECTION_TYPE: CONTACTS
OS_PH_CC: 20/40
METHOD: SNELLEN - LINEAR
OD_PH_CC: 20/60
OS_CC: 20/100

## 2023-10-18 ASSESSMENT — SLIT LAMP EXAM - LIDS
COMMENTS: GOOD POSITION
COMMENTS: GOOD POSITION

## 2023-10-18 ASSESSMENT — CONF VISUAL FIELD
OS_INFERIOR_TEMPORAL_RESTRICTION: 0
OD_SUPERIOR_TEMPORAL_RESTRICTION: 0
OS_INFERIOR_NASAL_RESTRICTION: 0
OD_NORMAL: 1
OS_NORMAL: 1
OD_INFERIOR_NASAL_RESTRICTION: 0
OS_SUPERIOR_TEMPORAL_RESTRICTION: 0
OS_SUPERIOR_NASAL_RESTRICTION: 0
OD_SUPERIOR_NASAL_RESTRICTION: 0
OD_INFERIOR_TEMPORAL_RESTRICTION: 0
METHOD: COUNTING FINGERS

## 2023-10-18 ASSESSMENT — EXTERNAL EXAM - RIGHT EYE: OD_EXAM: NORMAL

## 2023-10-18 ASSESSMENT — CUP TO DISC RATIO
OS_RATIO: 0.45
OD_RATIO: 0.4

## 2023-10-18 ASSESSMENT — EXTERNAL EXAM - LEFT EYE: OS_EXAM: NORMAL

## 2023-10-18 NOTE — PROGRESS NOTES
Assessment/Plan   Problem List Items Addressed This Visit          Eye/Vision problems    Hyperopia with presbyopia of both eyes - Primary     Discussed proper wear, care, replacement of contact lenses. Gave handout. D/c cl wear and RTC if eyes become red, painful, irritated. Monitor 1 year.   CL eval billed today. $25 Finalized Monovision RX for Precision 1 right eye (OD) reading left eye (OS) near with adequate comfort/fit/visual acuity (VA). BCVA 20/30 at distance and near.    Continue full exams with Dr. Banda as planned

## 2023-10-18 NOTE — ASSESSMENT & PLAN NOTE
Discussed proper wear, care, replacement of contact lenses. Gave handout. D/c cl wear and RTC if eyes become red, painful, irritated. Monitor 1 year.   CL eval billed today. $25 Finalized Monovision RX for Precision 1 right eye (OD) reading left eye (OS) near with adequate comfort/fit/visual acuity (VA). BCVA 20/30 at distance and near.    Continue full exams with Dr. Banda as planned

## 2023-10-23 ENCOUNTER — TELEPHONE (OUTPATIENT)
Dept: OPHTHALMOLOGY | Facility: CLINIC | Age: 73
End: 2023-10-23
Payer: MEDICARE

## 2023-10-23 NOTE — TELEPHONE ENCOUNTER
Annabel called to let us know her left eye (OS) is perfect, but her right eye (OD) she can't read.  I sent a message to Dr. Esquivel and will call her back when I hear from

## 2023-10-24 ENCOUNTER — DOCUMENTATION (OUTPATIENT)
Dept: OPHTHALMOLOGY | Facility: CLINIC | Age: 73
End: 2023-10-24
Payer: MEDICARE

## 2023-10-24 ASSESSMENT — REFRACTION_CURRENTRX
OS_CYLINDER: -1.25
OS_DIAMETER: 14.5
OS_AXIS: 100
OS_BASECURVE: 8.5
OS_BRAND: PRECISION 1 TORIC
OD_BASECURVE: 8.3
OD_DIAMETER: 14.2
OD_BRAND: PRECISION 1
OS_SPHERE: +3.00
OD_CYLINDER: SPHERE
OD_SPHERE: +4.00

## 2023-10-24 NOTE — PROGRESS NOTES
Patient having difficulty reading in new monovision contact lens (CL) RX. Will increase right eye (OD) to +4.00 sph and keep left at +3.00-1.25x100 (distance). Near vision could be limited by her cataract. Patient called to alert of change and trials to be shipped direct.

## 2023-10-29 PROBLEM — Z00.00 ROUTINE HEALTH MAINTENANCE: Status: ACTIVE | Noted: 2023-10-29

## 2023-10-30 ENCOUNTER — OFFICE VISIT (OUTPATIENT)
Dept: HEMATOLOGY/ONCOLOGY | Facility: HOSPITAL | Age: 73
End: 2023-10-30
Payer: MEDICARE

## 2023-10-30 ENCOUNTER — LAB (OUTPATIENT)
Dept: LAB | Facility: HOSPITAL | Age: 73
End: 2023-10-30
Payer: MEDICARE

## 2023-10-30 ENCOUNTER — APPOINTMENT (OUTPATIENT)
Dept: HEMATOLOGY/ONCOLOGY | Facility: HOSPITAL | Age: 73
End: 2023-10-30
Payer: MEDICARE

## 2023-10-30 VITALS
WEIGHT: 138 LBS | DIASTOLIC BLOOD PRESSURE: 74 MMHG | OXYGEN SATURATION: 97 % | TEMPERATURE: 97 F | SYSTOLIC BLOOD PRESSURE: 104 MMHG | BODY MASS INDEX: 24.85 KG/M2 | HEART RATE: 99 BPM

## 2023-10-30 DIAGNOSIS — R79.89 ABNORMAL SERUM CREATININE LEVEL: ICD-10-CM

## 2023-10-30 DIAGNOSIS — N18.31 STAGE 3A CHRONIC KIDNEY DISEASE (MULTI): ICD-10-CM

## 2023-10-30 DIAGNOSIS — C77.3 CARCINOMA OF LEFT BREAST METASTATIC TO AXILLARY LYMPH NODE (MULTI): Primary | ICD-10-CM

## 2023-10-30 DIAGNOSIS — C50.919 MALIGNANT NEOPLASM OF UNSPECIFIED SITE OF UNSPECIFIED FEMALE BREAST (MULTI): ICD-10-CM

## 2023-10-30 DIAGNOSIS — Z79.810 ENCOUNTER FOR MONITORING TAMOXIFEN THERAPY: ICD-10-CM

## 2023-10-30 DIAGNOSIS — Z00.00 ROUTINE HEALTH MAINTENANCE: ICD-10-CM

## 2023-10-30 DIAGNOSIS — Z51.81 ENCOUNTER FOR MONITORING TAMOXIFEN THERAPY: ICD-10-CM

## 2023-10-30 DIAGNOSIS — M85.80 OSTEOPENIA, UNSPECIFIED LOCATION: ICD-10-CM

## 2023-10-30 DIAGNOSIS — C50.912 CARCINOMA OF LEFT BREAST METASTATIC TO AXILLARY LYMPH NODE (MULTI): Primary | ICD-10-CM

## 2023-10-30 DIAGNOSIS — C50.912 CARCINOMA OF LEFT BREAST METASTATIC TO AXILLARY LYMPH NODE (MULTI): ICD-10-CM

## 2023-10-30 DIAGNOSIS — C77.3 CARCINOMA OF LEFT BREAST METASTATIC TO AXILLARY LYMPH NODE (MULTI): ICD-10-CM

## 2023-10-30 LAB
ALBUMIN SERPL BCP-MCNC: 4.2 G/DL (ref 3.4–5)
ALP SERPL-CCNC: 29 U/L (ref 33–136)
ALT SERPL W P-5'-P-CCNC: 10 U/L (ref 7–45)
ANION GAP SERPL CALC-SCNC: 14 MMOL/L (ref 10–20)
AST SERPL W P-5'-P-CCNC: 16 U/L (ref 9–39)
BASOPHILS # BLD AUTO: 0.02 X10*3/UL (ref 0–0.1)
BASOPHILS NFR BLD AUTO: 0.3 %
BILIRUB SERPL-MCNC: 0.4 MG/DL (ref 0–1.2)
BUN SERPL-MCNC: 17 MG/DL (ref 6–23)
CALCIUM SERPL-MCNC: 9.3 MG/DL (ref 8.6–10.3)
CHLORIDE SERPL-SCNC: 104 MMOL/L (ref 98–107)
CO2 SERPL-SCNC: 27 MMOL/L (ref 21–32)
CREAT SERPL-MCNC: 1.63 MG/DL (ref 0.5–1.05)
EOSINOPHIL # BLD AUTO: 0.37 X10*3/UL (ref 0–0.4)
EOSINOPHIL NFR BLD AUTO: 5.8 %
ERYTHROCYTE [DISTWIDTH] IN BLOOD BY AUTOMATED COUNT: 13.2 % (ref 11.5–14.5)
GFR SERPL CREATININE-BSD FRML MDRD: 33 ML/MIN/1.73M*2
GLUCOSE SERPL-MCNC: 104 MG/DL (ref 74–99)
HCT VFR BLD AUTO: 38.4 % (ref 36–46)
HGB BLD-MCNC: 13 G/DL (ref 12–16)
IMM GRANULOCYTES # BLD AUTO: 0.01 X10*3/UL (ref 0–0.5)
IMM GRANULOCYTES NFR BLD AUTO: 0.2 % (ref 0–0.9)
LYMPHOCYTES # BLD AUTO: 2.65 X10*3/UL (ref 0.8–3)
LYMPHOCYTES NFR BLD AUTO: 41.9 %
MAGNESIUM SERPL-MCNC: 2.21 MG/DL (ref 1.6–2.4)
MCH RBC QN AUTO: 30.8 PG (ref 26–34)
MCHC RBC AUTO-ENTMCNC: 33.9 G/DL (ref 32–36)
MCV RBC AUTO: 91 FL (ref 80–100)
MONOCYTES # BLD AUTO: 0.55 X10*3/UL (ref 0.05–0.8)
MONOCYTES NFR BLD AUTO: 8.7 %
NEUTROPHILS # BLD AUTO: 2.73 X10*3/UL (ref 1.6–5.5)
NEUTROPHILS NFR BLD AUTO: 43.1 %
NRBC BLD-RTO: 0 /100 WBCS (ref 0–0)
PHOSPHATE SERPL-MCNC: 4 MG/DL (ref 2.5–4.9)
PLATELET # BLD AUTO: 204 X10*3/UL (ref 150–450)
PMV BLD AUTO: 9.8 FL (ref 7.5–11.5)
POTASSIUM SERPL-SCNC: 4.2 MMOL/L (ref 3.5–5.3)
PROT SERPL-MCNC: 7.2 G/DL (ref 6.4–8.2)
RBC # BLD AUTO: 4.22 X10*6/UL (ref 4–5.2)
SODIUM SERPL-SCNC: 141 MMOL/L (ref 136–145)
WBC # BLD AUTO: 6.3 X10*3/UL (ref 4.4–11.3)

## 2023-10-30 PROCEDURE — 99215 OFFICE O/P EST HI 40 MIN: CPT | Performed by: NURSE PRACTITIONER

## 2023-10-30 PROCEDURE — 1160F RVW MEDS BY RX/DR IN RCRD: CPT | Performed by: NURSE PRACTITIONER

## 2023-10-30 PROCEDURE — 3078F DIAST BP <80 MM HG: CPT | Performed by: NURSE PRACTITIONER

## 2023-10-30 PROCEDURE — 84100 ASSAY OF PHOSPHORUS: CPT | Performed by: INTERNAL MEDICINE

## 2023-10-30 PROCEDURE — 80053 COMPREHEN METABOLIC PANEL: CPT

## 2023-10-30 PROCEDURE — 1036F TOBACCO NON-USER: CPT | Performed by: NURSE PRACTITIONER

## 2023-10-30 PROCEDURE — 83735 ASSAY OF MAGNESIUM: CPT

## 2023-10-30 PROCEDURE — 36415 COLL VENOUS BLD VENIPUNCTURE: CPT

## 2023-10-30 PROCEDURE — 1126F AMNT PAIN NOTED NONE PRSNT: CPT | Performed by: NURSE PRACTITIONER

## 2023-10-30 PROCEDURE — 1159F MED LIST DOCD IN RCRD: CPT | Performed by: NURSE PRACTITIONER

## 2023-10-30 PROCEDURE — 3074F SYST BP LT 130 MM HG: CPT | Performed by: NURSE PRACTITIONER

## 2023-10-30 PROCEDURE — 85025 COMPLETE CBC W/AUTO DIFF WBC: CPT

## 2023-10-30 RX ORDER — TAMOXIFEN CITRATE 20 MG/1
20 TABLET ORAL DAILY
Qty: 90 TABLET | Refills: 3 | Status: SHIPPED | OUTPATIENT
Start: 2023-10-30 | End: 2024-06-03 | Stop reason: SDUPTHER

## 2023-10-30 ASSESSMENT — PAIN SCALES - GENERAL: PAINLEVEL: 0-NO PAIN

## 2023-10-30 NOTE — PROGRESS NOTES
Patient ID: Annabel Cottrell is a 73 y.o. female.  BREAST CANCER DIAGNOSIS:         Breast         AJCC Edition: 8th (AJCC), Diagnosis Date: Dec 2019, Stage(no match), ypT2 ypN3 cM0  G2     Treatment Synopsis:    BREAST CANCER DIAGNOSIS  ypT2 ypN3M0 ER/FL+, HER2- lobular breast cancer        Treatment History:    1. Abnormal screening mammogram on 11/5/2019 showed architectural distortion in central lateral left breast.  2. 12/6/2019, left diagnostic mammogam/US demonstrated 1.1 cm irregular hypoechoic mass in 2:30 position, 3 cm from nipple with slightly abnormal  axillary lymph node. Biopsy of left breast and axillary lymph node yielded invasive lobular carcinoma grade 2, LCIS,  ER >95%, FL >95%, HER2 negative with metastatic carcinoma involving left axillary lymph node. Tumor board rec breast MRI, preoperative hormonal therapy vs primary surgery.   3. Breast MRI on 12/17/2019 demonstrated known biopsy-proven left breast mass with adjacent non-mass enhancement measuring up to 2.2 x 1.5 cm and left axillary level 1 metastatic lymphadenopathy.  Indeterminate right breast enhancing mass.  4. Us-guided needle biopsy of right breast on 12/19/2019 showed a mass at 8:00 4 CM  from nipple -- intraductal papilloma with focal atypia.   5. Preoperative hormonal treatment with anastrozole initiated 1/9/20.  She did not tolerate this (edema) and was switched to  exemestane 2/19/20. On 3/16 she stopped exemestane for side effects. She took two doses of anastrozole 4/1 and 4/2 then stopped for edema.  Letrozole initiated 4/8/20. MammaPrint on core was Low Risk Luminal A.  6. Progression on breast imaging 5/20/20. Staging was negative other than a single sclerotic rib lesion. Left partial mastectomy and SLN on 6/11/20 showed 3 cm residual tumor with multiple close and positive margins, 4/4 positive SLN. Right excision of  area of intraductal papilloma showed no additional findings. Reexcision left breast and ALND 7/1/20 showed LCIS  in the breast and additional 4/4 positive ALN for total of 8/8 positive LNs.  7. Postop chemotherapy with Docetaxel/Cytoxan x 4-6 cycles and radiation planned. First Docetaxel/Cytoxan 7/31/20. Stopped after 1st cycle because of hospital admission for sepsis/febrile neutropenia despite pegfilgrastim. Repeat PET/CT negative 8/2020.  8. Adjuvant RT completed 11/6/20.   9. Adjuvant letrozole initiated 12/2020. Due to intolerable symptoms, she switched to tamoxifen in July 2021.  10. Adjuvant Zometa initiated 1/2021.      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), Other abnormal and inconclusive findings on diagnostic imaging of breast (12/19/2019), Personal history of malignant neoplasm of thyroid (12/19/2019), Personal history of other malignant neoplasm of skin, Personal history of other medical treatment (09/08/2017), Personal history of other specified conditions (12/06/2019), Pneumonitis due to inhalation of food and vomit (CMS/HCC) (10/06/2016), and Vaginal atrophy (04/04/2023).HTN  Hypercholesterolemia  CKD III   Anxiety, Depression   [place]   Osteopenia  Vitamin D deficiency  Hypothyroidism  GERD  Surgical History: Thyroid surgery (01/15/2014); Hysterectomy (08/03/2017, Left partial mastectomy,   Social HistoryLanguage preferred: English and Macedonian  Two daughter and four grandchildren. One daughter is a PCP physician  Exercise habits: Plays tennis. Active.   and lives with  Luis Miguel who works in construction business   Never smoker  Social drinker  Work history: homemaker  Social Substance History:  ·  Social History denies smoking, alcohol and drug use   ·  Smoking Status never smoker (1)   ·  Additional History        FamHx:   Grandfather: tongue cancer (heavy smoker)    Positive with myocardial infarction and cardiac arrhythmia     ObGyn Hx:  Menarche at age 12   with first birth at 25  menopausal at age 48 with Premarin usage for severe postmenopausal symptoms. 21 years of HRT and stopped in Dec 2019 after Dx of BrCa.    Family History:    Family History   Problem Relation Name Age of Onset    Heart attack Mother      Heart attack Father      Other (cardiac arrhythmia) Sister       Family Oncology History:  Cancer-related family history is not on file.    HISTORY OF PRESENT ILLNESS:  Annabel Cottrell is a 73 y.o. female who returns today for Breast cancer surveillance and follow-up. She is accompanied by her  today. She continues compliant on Tamoxifen- she is tolerating this well and reports far less hot flashes. She completed a course of Ozempic for 3-4 months and lost 30+ lbs and has kept this off. She has no breast cancer concerns today.      She denies any chest pain or breathing issues, no cough.     She denies any headache issues, or loss of balance, no falls. She has baseline issues with vision decline and hearing decline. She has found to have bilateral cataracts - will possibly have surgery sometime next year. She will be seeing an audiologist.      She denies any new or unexplained bone aches or pains. She reports left hip has stopped hurting with weight loss.      She denies any skin lesions or masses, oral sores lesions or infections. She see derm regularly for dermatology.      She reports a normal appetite but works to eat less.  She reports continued issues with constipation uses dietary changes.  She denies issues with urination.      She denies any issues with sleep, mild fatigue.       Review of Systems - Oncology  ROS 14 points performed, See HPI for exceptions    OBJECTIVE:    VS / Pain:  /74   Pulse 99   Temp 36.1 °C (97 °F)   Wt 62.6 kg (138 lb)   SpO2 97%   BMI 24.85 kg/m²   BSA:  1.66 meters squared   Pain Scale: 0  ECO- Fully active, able to carry on all pre-disease performance w/o restriction.      Performance Status:       Physical Exam  Constitutional: Well developed, awake/alert/oriented x4, no distress, alert and cooperative  EYES: Sclera clear  ENMT: mucous membranes moist, no apparent injury, no lesions seen  Head/Neck: Neck supple, no apparent injury, thyroid without mass or tenderness, No JVD, trachea midline, no bruits  Respiratory / Thoracic: Patent airways, clear to all lobes, normal breath sounds with good chest expansion, thorax symmetric.  Cardiovascular: Regular, rate and rhythm, no murmurs, 2+ equal pulses of the extremities, normal auscultated S 1and S 2  GI: Nondistended, soft, non-tender, no rebound tenderness or guarding, no masses palpable, no organomegaly, +BS, no bruits  Musculoskeletal: ROM intact, no joint swelling, normal strength, no spinal tenderness  Extremities: normal extremities, no cyanosis edema, contusions or wounds, no clubbing  Neurological: alert and oriented x4, intact senses, motor, response and reflexes, normal strength  Breast: s/p left partial mastectomy and radiation  therapy. Bilateral breasts free of any palpable masses or lesions, + tissue thickening effect of radiation therapy to left breast.   Lymphatic: No cervical, supraclavicular, infraclavicular or axillary lymphadenopathy  Psychological: Appropriate and talkative mood and behavior  Skin: Warm and dry, no lesions, no rashes, no jaundice    Diagnostic Results   BI mammo bilateral screening tomosynthesis  Narrative: Interpreted By:  ROMEO LEIJA MD  MRN: [id number]  Patient Name: DAKOTA ESTEVES     STUDY:  DIGITAL MAMM SCREENING W/ PAUL;  2023 11:01 am     ACCESSION NUMBER(S):  [id number]     ORDERING CLINICIAN:  RAQUEL ROJAS     INDICATION:  Screening. History of left lumpectomy and benign right excisional  biopsy.     COMPARISON:  2022, 2021.     FINDINGS:  2D  and tomosynthesis images were reviewed at 1 mm slice thickness.     The breast tissue is heterogeneously dense, which may obscure small  masses.  Stable postoperative scarring is seen in the deep upper  outer left breast. There is also stable postoperative scarring of the  right breast. No suspicious masses or calcifications are identified.     Impression: Stable posttreatment changes of the left breast. No mammographic  evidence of malignancy.     BI-RADS CATEGORY:     Category: 2 - Benign.  Recommendation: 1 Year Screening.     For any future breast imaging appointments, please call [phone]  ([phone]).     Patient letter sent SNORM             BI mammo bilateral screening tomosynthesis     Narrative  Interpreted By:  ROMEO LEIJA MD  MRN: [id number]  Patient Name: DAKOTA ESTEVES    STUDY:  DIGITAL MAMM SCREENING W/ PAUL;  4/24/2023 11:01 am    ACCESSION NUMBER(S):  [id number]    ORDERING CLINICIAN:  RAQUEL ROJAS    INDICATION:  Screening. History of left lumpectomy and benign right excisional  biopsy.    COMPARISON:  04/20/2022, 03/29/2021.    FINDINGS:  2D and tomosynthesis images were reviewed at 1 mm slice thickness.    The breast tissue is heterogeneously dense, which may obscure small  masses.  Stable postoperative scarring is seen in the deep upper  outer left breast. There is also stable postoperative scarring of the  right breast. No suspicious masses or calcifications are identified.    Impression  Stable posttreatment changes of the left breast. No mammographic  evidence of malignancy.    BI-RADS CATEGORY:    Category: 2 - Benign.  Recommendation: 1 Year Screening.    For any future breast imaging appointments, please call [phone]  ([phone]).    Patient letter sent SNORM       No images are attached to the encounter.    Lab on 10/30/2023   Component Date Value Ref Range Status    WBC 10/30/2023 6.3  4.4 - 11.3 x10*3/uL Final    nRBC 10/30/2023 0.0  0.0 - 0.0 /100 WBCs Final    RBC 10/30/2023 4.22   4.00 - 5.20 x10*6/uL Final    Hemoglobin 10/30/2023 13.0  12.0 - 16.0 g/dL Final    Hematocrit 10/30/2023 38.4  36.0 - 46.0 % Final    MCV 10/30/2023 91  80 - 100 fL Final    MCH 10/30/2023 30.8  26.0 - 34.0 pg Final    MCHC 10/30/2023 33.9  32.0 - 36.0 g/dL Final    RDW 10/30/2023 13.2  11.5 - 14.5 % Final    Platelets 10/30/2023 204  150 - 450 x10*3/uL Final    MPV 10/30/2023 9.8  7.5 - 11.5 fL Final    Neutrophils % 10/30/2023 43.1  40.0 - 80.0 % Final    Immature Granulocytes %, Automated 10/30/2023 0.2  0.0 - 0.9 % Final    Lymphocytes % 10/30/2023 41.9  13.0 - 44.0 % Final    Monocytes % 10/30/2023 8.7  2.0 - 10.0 % Final    Eosinophils % 10/30/2023 5.8  0.0 - 6.0 % Final    Basophils % 10/30/2023 0.3  0.0 - 2.0 % Final    Neutrophils Absolute 10/30/2023 2.73  1.60 - 5.50 x10*3/uL Final    Immature Granulocytes Absolute, Au* 10/30/2023 0.01  0.00 - 0.50 x10*3/uL Final    Lymphocytes Absolute 10/30/2023 2.65  0.80 - 3.00 x10*3/uL Final    Monocytes Absolute 10/30/2023 0.55  0.05 - 0.80 x10*3/uL Final    Eosinophils Absolute 10/30/2023 0.37  0.00 - 0.40 x10*3/uL Final    Basophils Absolute 10/30/2023 0.02  0.00 - 0.10 x10*3/uL Final    Glucose 10/30/2023 104 (H)  74 - 99 mg/dL Final    Sodium 10/30/2023 141  136 - 145 mmol/L Final    Potassium 10/30/2023 4.2  3.5 - 5.3 mmol/L Final    Chloride 10/30/2023 104  98 - 107 mmol/L Final    Bicarbonate 10/30/2023 27  21 - 32 mmol/L Final    Anion Gap 10/30/2023 14  10 - 20 mmol/L Final    Urea Nitrogen 10/30/2023 17  6 - 23 mg/dL Final    Creatinine 10/30/2023 1.63 (H)  0.50 - 1.05 mg/dL Final    eGFR 10/30/2023 33 (L)  >60 mL/min/1.73m*2 Final    Calcium 10/30/2023 9.3  8.6 - 10.3 mg/dL Final    Albumin 10/30/2023 4.2  3.4 - 5.0 g/dL Final    Alkaline Phosphatase 10/30/2023 29 (L)  33 - 136 U/L Final    Total Protein 10/30/2023 7.2  6.4 - 8.2 g/dL Final    AST 10/30/2023 16  9 - 39 U/L Final    Bilirubin, Total 10/30/2023 0.4  0.0 - 1.2 mg/dL Final    ALT 10/30/2023  10  7 - 45 U/L Final    Magnesium 10/30/2023 2.21  1.60 - 2.40 mg/dL Final    Phosphorus 10/30/2023 4.0  2.5 - 4.9 mg/dL Final        Assessment/Plan   Carcinoma of left breast metastatic to axillary lymph node (CMS/HCC), Clinical: Stage IIIB (cT2, cN3, cM0, G2, ER+, HI+, HER2-)  Diagnoses and all orders for this visit:  Carcinoma of left breast metastatic to axillary lymph node (CMS/HCC) (Primary)  -     Referral to Nephrology; Future  -     Clinic Appointment Request Follow up; RAQUEL ROJAS; Future  -     BI mammo bilateral screening tomosynthesis; Future  -     tamoxifen (Nolvadex) 20 mg tablet; Take 1 tablet (20 mg total) by mouth once daily.  Osteopenia, unspecified location  -     Referral to Nephrology; Future  -     Clinic Appointment Request Follow up; RAQUEL ROJAS; Future  -     BI mammo bilateral screening tomosynthesis; Future  -     tamoxifen (Nolvadex) 20 mg tablet; Take 1 tablet (20 mg total) by mouth once daily.  Routine health maintenance  Stage 3a chronic kidney disease (CMS/HCC)  -     Referral to Nephrology; Future  Abnormal serum creatinine level  -     Referral to Nephrology; Future  Encounter for monitoring tamoxifen therapy  Other orders  -     Cancel: sodium chloride 0.9 % bolus 500 mL  -     Cancel: zoledronic acid (Zometa) 3.3 mg in dextrose 5% 100 mL IV  -     Cancel: sodium chloride 0.9 % bolus 500 mL  -     Cancel: dextrose 5 % bolus 500 mL  -     Cancel: diphenhydrAMINE (BENADryl) injection 50 mg  -     Cancel: methylPREDNISolone sod suc / (SOLU-Medrol) 40 mg injection 40 mg  -     Cancel: famotidine PF (Pepcid) injection 20 mg  -     Cancel: EPINEPHrine (Epipen) injection syringe 0.3 mg  -     Cancel: albuterol 2.5 mg /3 mL (0.083 %) nebulizer solution 3 mL    Problem List Items Addressed This Visit       Carcinoma of left breast metastatic to axillary lymph node (CMS/HCC) - Primary    Relevant Medications    tamoxifen (Nolvadex) 20 mg tablet    Other Relevant Orders     Referral to Nephrology    Clinic Appointment Request Follow up; RAQUEL ROJAS    BI mammo bilateral screening tomosynthesis    CKD (chronic kidney disease), stage III (CMS/HCC)    Relevant Orders    Referral to Nephrology    Osteopenia    Relevant Medications    tamoxifen (Nolvadex) 20 mg tablet    Other Relevant Orders    Referral to Nephrology    Clinic Appointment Request Follow up; RAQUEL ROJAS    BI mammo bilateral screening tomosynthesis    Routine health maintenance    Encounter for monitoring tamoxifen therapy     Other Visit Diagnoses       Abnormal serum creatinine level        Relevant Orders    Referral to Nephrology           ypT2 ypN3 cM0 ER/NC+, HER2- lobular breast cancer (left). Initially treated with neoadjuvant endocrine therapy with clinical progression. She  is s/p lumpectomy and ALND on 7/1/20. She was unable to tolerate adjuvant chemotherapy (received TC x1 cycle), but completed radiation therapy.  She was initiated on adjuvant letrozole in 12/2020; adjuvant bisphosphonates were also pursued. Due to grade  2-3 arthralgias, she was switched to tamoxifen in 7/2021.     - She is tolerating tamoxifen very well overall. Continue 20 mg daily, goal 10 years of therapy.     - Grade 1 hot flashes.  duloxetine has been discontinued, complete resolve of sx      There is no evidence on clinical breast exam today for breast cancer recurrence. RTC in 6 months with annual mammogram     Osteopenia. DEXA 4/2022- normal results. Planned repeat 4/2024.     - Continue calcium and vitamin D supplementation.  - Zometa, 6th was due today, however worsening of CrCl is now 30 from todays Labs. Discontinuation of Zometa, referral to Nephrology for worsening of CKD    Hypothyroidism. Follows with PCP.     H/o endometrial cancer. S/p hysterectomy and BSO.      Treatment Plan:  Venous Access Orders           Thank you for the opportunity to be involved in the care of Annabel Cottrell.          We discussed the  clinical significance of diagnosis, goals of care and treatment plan in detail.         Please do not hesitate to reach out with any questions. Thank you.     -------------------------------------------------------------------------------------------------------------------------------  Carmen Dorsey MSN, APRN, FNP-C  [facility]  Division of Medical Oncology- Breast   Collaborating Physician Dr. Nehemias Lopez   [place] [address]  Phone: [phone]  Fax: [phone]  Available via Secure Chat    Confidential Peer Review Document-  Privilege  Privileged Pursuant to [place] Revised Code Section 2305.24, .25, .251 & .252

## 2023-10-30 NOTE — PATIENT INSTRUCTIONS
#6 and final  Zometa - I have canceled this for elevation in the Creatinine that continues to creep up. I have provided a referral to Dr Up     Your DEXA was normal. We will recheck this in 2 years again- June / July 2024     Mammogram today next due AFTER 4/24/24     Carmen ALFRED, CNP follow-up in 6 months same day as mammogram     PCP Dr Meng annually and as scheduled      Call with any questions, concerns or treatment intolerance prior to next office visit [phone].  
[Follow-Up: _____] : a [unfilled] follow-up visit

## 2023-11-01 ENCOUNTER — TELEPHONE (OUTPATIENT)
Dept: OPHTHALMOLOGY | Facility: CLINIC | Age: 73
End: 2023-11-01
Payer: MEDICARE

## 2023-11-01 NOTE — TELEPHONE ENCOUNTER
Returned a call from Annabel who states she is still having problems with her vision and the new contacts.  I sent a message to the  To give her a call.

## 2023-11-02 ENCOUNTER — DOCUMENTATION (OUTPATIENT)
Dept: OPHTHALMOLOGY | Facility: CLINIC | Age: 73
End: 2023-11-02
Payer: MEDICARE

## 2023-11-02 ASSESSMENT — REFRACTION_CURRENTRX
OS_BASECURVE: 8.5
OS_SPHERE: +3.00
OD_CYLINDER: SPHERE
OD_SPHERE: +7.50
OD_BRAND: PRECISION 1
OD_BASECURVE: 8.3
OS_AXIS: 100
OS_CYLINDER: -1.25
OS_BRAND: PRECISION 1 TORIC
OD_DIAMETER: 14.2
OS_DIAMETER: 14.5

## 2023-11-02 NOTE — PROGRESS NOTES
Patient called back with continued blur near vision in monovision setup. She reports trying her old +7.50 sph lens right eye (OD) and this seemed to give better near vision. Her distance vision is fine with the current left lens.     Although the +7.50 does not necessarily make sense, based on the current spec RX, patient has been trailing and is comfortable with this combination so I am okay finalizing with good comfort and fit.

## 2023-11-08 ENCOUNTER — OFFICE VISIT (OUTPATIENT)
Dept: CARDIOLOGY | Facility: HOSPITAL | Age: 73
End: 2023-11-08
Payer: MEDICARE

## 2023-11-08 VITALS
SYSTOLIC BLOOD PRESSURE: 104 MMHG | BODY MASS INDEX: 24.54 KG/M2 | HEART RATE: 90 BPM | HEIGHT: 63 IN | DIASTOLIC BLOOD PRESSURE: 52 MMHG | WEIGHT: 138.5 LBS

## 2023-11-08 DIAGNOSIS — I25.10 ATHEROSCLEROSIS OF NATIVE CORONARY ARTERY OF NATIVE HEART WITHOUT ANGINA PECTORIS: Primary | ICD-10-CM

## 2023-11-08 DIAGNOSIS — E78.00 HYPERCHOLESTEROLEMIA: ICD-10-CM

## 2023-11-08 DIAGNOSIS — I10 BENIGN ESSENTIAL HYPERTENSION: ICD-10-CM

## 2023-11-08 PROCEDURE — 1126F AMNT PAIN NOTED NONE PRSNT: CPT | Performed by: INTERNAL MEDICINE

## 2023-11-08 PROCEDURE — 99214 OFFICE O/P EST MOD 30 MIN: CPT | Performed by: INTERNAL MEDICINE

## 2023-11-08 PROCEDURE — 1160F RVW MEDS BY RX/DR IN RCRD: CPT | Performed by: INTERNAL MEDICINE

## 2023-11-08 PROCEDURE — 1159F MED LIST DOCD IN RCRD: CPT | Performed by: INTERNAL MEDICINE

## 2023-11-08 PROCEDURE — 93005 ELECTROCARDIOGRAM TRACING: CPT | Performed by: INTERNAL MEDICINE

## 2023-11-08 PROCEDURE — 3078F DIAST BP <80 MM HG: CPT | Performed by: INTERNAL MEDICINE

## 2023-11-08 PROCEDURE — 3074F SYST BP LT 130 MM HG: CPT | Performed by: INTERNAL MEDICINE

## 2023-11-08 PROCEDURE — 1036F TOBACCO NON-USER: CPT | Performed by: INTERNAL MEDICINE

## 2023-11-08 PROCEDURE — 93010 ELECTROCARDIOGRAM REPORT: CPT | Performed by: INTERNAL MEDICINE

## 2023-11-08 ASSESSMENT — PATIENT HEALTH QUESTIONNAIRE - PHQ9
SUM OF ALL RESPONSES TO PHQ9 QUESTIONS 1 AND 2: 0
2. FEELING DOWN, DEPRESSED OR HOPELESS: NOT AT ALL
1. LITTLE INTEREST OR PLEASURE IN DOING THINGS: NOT AT ALL

## 2023-11-08 ASSESSMENT — ENCOUNTER SYMPTOMS
OCCASIONAL FEELINGS OF UNSTEADINESS: 0
DEPRESSION: 0
LOSS OF SENSATION IN FEET: 0

## 2023-11-08 NOTE — PROGRESS NOTES
Primary Care Physician: Kenzie Veliz MD  Date of Visit: 11/08/2023  2:20 PM EST  Location of visit: [facility]     Chief Complaint:   Chief Complaint   Patient presents with    Hypertension    Coronary Artery Disease    Hyperlipidemia        HPI / Summary:   Annabel Cottrell is a 73 y.o. female presents for followup.  She has no particular complaints.  She lost more than 30 pounds after taking Ozempic for 4 months and modifying her diet with intermittent fasting.  She is able to perform housework and walk up and down stairs without chest pain or shortness of breath.  She does note orthostatic lightheadedness after bending over or standing up too quickly.  She did have labs last week and her creatinine had increased to 1.6.  She has been taking half of the valsartan 40 mg tablet and half of her Crestor for the last week after having this blood work.  She also stopped taking aspirin.  The patient denies chest pain, shortness of breath, palpitations, syncope, orthopnea, paroxysmal nocturnal dyspnea, lower extremity edema, or bleeding problems.          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    Malignant neoplasm of uterine fundus    Other abnormal and inconclusive findings on diagnostic imaging of breast 12/19/2019    Abnormal MRI, breast    Personal history of malignant neoplasm of thyroid 12/19/2019    History of malignant neoplasm of thyroid    Personal history of other malignant neoplasm of skin     History of other malignant neoplasm of skin    Personal history of other medical treatment 09/08/2017    History of screening mammography    Personal history  of other specified conditions 12/06/2019    History of abnormal mammogram    Pneumonitis due to inhalation of food and vomit (CMS/HCC) 10/06/2016    Aspiration pneumonia of both upper lobes due to gastric secretions    Vaginal atrophy 04/04/2023        Past Surgical History:  Past Surgical History:   Procedure Laterality Date    HYSTERECTOMY  08/03/2017    Hysterectomy    OTHER SURGICAL HISTORY  06/25/2020    Mastectomy partial    OTHER SURGICAL HISTORY  06/25/2020    [place] lymph node biopsy procedure    OTHER SURGICAL HISTORY  07/06/2020    Axillary lymphadenectomy    THYROID SURGERY  01/15/2014    Thyroid Surgery          Social History:   reports that she has never smoked. She has never used smokeless tobacco.     Family History:  family history includes Heart attack in her father and mother; cardiac arrhythmia in her sister.      Allergies:  Allergies   Allergen Reactions    Aloe Vera Latex Gloves [Gloves, Latex With Aloe Vera] Unknown     Pt is only allergic to LATEX    Codeine Unknown       Outpatient Medications:  Current Outpatient Medications   Medication Instructions    ascorbic acid, vitamin C, 500 mg capsule 1 capsule, oral, 2 times daily    calcium carbonate 600 mg calcium (1,500 mg) tablet 2 tablets, oral, Daily    cholecalciferol (Vitamin D-3) 125 MCG (5000 UT) capsule 1 capsule, oral, Daily    estrogens, conjugated, (Premarin) vaginal cream Apply pea-sized amount to the vaginal opening 3 times weekly as directed    fluocinolone (Derma-Smoothe) 0.01 % external oil Topical, 2 times daily    fluorouracil (Efudex) 5 % cream Topical, 2 times daily    levothyroxine (SYNTHROID, LEVOXYL) 112 mcg, oral, Daily, as directed    magnesium gluconate (Magonate) 27.5 mg magne- sium (500 mg) tablet 1 tablet, oral, Daily    omeprazole (PriLOSEC) 20 mg DR capsule 1 capsule, oral, Daily    potassium chloride ER (Micro-K) 10 mEq ER capsule 1 capsule, oral, Daily    rosuvastatin (Crestor) 40 mg tablet 1 tablet, oral,  "Nightly    semaglutide (Ozempic) 1 mg/dose (4 mg/3 mL) pen injector subcutaneous, Weekly    sod picosulf-mag ox-citric ac (Clenpiq) 10 mg-3.5 gram- 12 gram/175 mL solution 1 Box, oral, See admin instructions, Starting at 6pm night before procedure drink 1 bottle as per instructions, then 5 hours before procedure time drink 2nd as instructed    sod picosulf-mag ox-citric ac (Clenpiq) 10 mg-3.5 gram- 12 gram/175 mL solution 1 Box, oral, See admin instructions, Starting at 6pm night before procedure drink 1 bottle as per instructions, then 5 hours before procedure time drink 2nd as instructed    tamoxifen (NOLVADEX) 20 mg, oral, Daily    traMADol (Ultram) 50 mg tablet 1 tablet, oral, 3 times daily PRN    venlafaxine XR (Effexor-XR) 75 mg 24 hr capsule 1 capsule, oral, Daily    zinc gluconate 50 mg tablet 1 tablet, oral, Daily    zoledronic acid (Zometa) 4 mg/100 mL piggyback intravenous       Physical Exam:  Vitals:    11/08/23 1413   BP: 104/52   BP Location: Left arm   Patient Position: Sitting   BP Cuff Size: Adult   Pulse: 90   Weight: 62.8 kg (138 lb 8 oz)   Height: 1.588 m (5' 2.5\")     Wt Readings from Last 5 Encounters:   11/08/23 62.8 kg (138 lb 8 oz)   10/30/23 62.6 kg (138 lb)   09/28/23 67.1 kg (147 lb 14.9 oz)   04/05/23 65.3 kg (144 lb)   11/23/22 74.8 kg (165 lb)     Body mass index is 24.93 kg/m².   General: Well-developed well-nourished in no acute distress  HEENT: Normocephalic atraumatic  Neck: Supple, JVP is normal negative hepatojugular reflux 2+ carotid pulses without bruit  Pulmonary: Normal respiratory effort, clear to auscultation  Cardiovascular: No right ventricular heave, normal S1 and S2, no murmurs rubs or gallops  Abdomen: Soft nontender nondistended  Extremities: Warm without edema 2+ radial pulses bilaterally   Neurologic: Alert and oriented x3  Psychiatric: Normal mood and affect     Last Labs:  CMP:  Recent Labs     10/30/23  0942 04/24/23  1008 04/05/23  1304 03/01/23  1123 " "12/23/22  1154    142 137 144 139   K 4.2 3.9 3.8 4.0 3.0*    105 99 104 101   CO2 27 29 27 30 30   ANIONGAP 14 12 15 14 11   BUN 17 17 16 21 17   CREATININE 1.63* 1.33* 1.34* 1.48* 1.40*   EGFR 33*  --   --   --   --    GLUCOSE 104* 98 99 96 84     Recent Labs     10/30/23  0942 04/24/23  1008 04/05/23  1304 03/01/23  1123 12/23/22  1154 08/07/20  0624 08/06/20  2027   ALBUMIN 4.2 4.2 4.3 4.2 4.4   < > 4.3   ALKPHOS 29* 26* 30* 35 41   < > 74   ALT 10 14 22 25 15   < > 20   AST 16 18 25 23 21   < > 19   BILITOT 0.4 0.5 0.4 0.4 0.4   < > 0.8   LIPASE  --   --   --   --   --   --  16    < > = values in this interval not displayed.     CBC:  Recent Labs     10/30/23  0942 04/24/23  1008 04/05/23  1304 10/31/22  1134 09/15/22  1312   WBC 6.3 5.7 7.8 5.5 6.2   HGB 13.0 12.7 13.2 12.8 13.2   HCT 38.4 37.9 39.5 37.6 40.8    231 256 209 298   MCV 91 91 90 93 99     COAG:   Recent Labs     09/03/20  1304 08/12/20  1232 07/17/20  1255 05/20/20  1621   INR 1.0 1.0 1.0 1.0     HEME/ENDO:  Recent Labs     03/01/23  1123 12/23/22  1154 10/31/22  1133 09/15/22  1312   TSH 0.10* 1.74 2.66 14.48*   HGBA1C 5.6  --   --   --       CARDIAC: No results for input(s): \"LDH\", \"CKMB\", \"TROPHS\", \"BNP\" in the last 67012 hours.    No lab exists for component: \"CK\", \"CKMBP\"  Recent Labs     03/01/23  1123 09/15/22  1312 02/07/22  1340   CHOL 125 187 128   LDLF 51 86 56   HDL 49.4 73.8 49.2   TRIG 121 137 116       Last Cardiology Tests:  ECG:  An electrocardiogram performed today that I reviewed shows normal sinus rhythm and is normal.    Echo: February 16, 2022  CONCLUSIONS:   1. The left ventricular systolic function is normal with a 65-70% estimated ejection fraction.   2. Spectral Doppler shows an impaired relaxation pattern of left ventricular diastolic filling.   3. The left ventricular cavity size is decreased.       Cath:      Stress Test:  Stress Results:  No results found for this or any previous visit from the " past 365 days.         Cardiac Imaging:  CT of the chest abdomen and pelvis August 6, 2020  HEART AND VESSELS:  No discrete filling defects within the main pulmonary artery or its  proximal branches to the segmental level. Evaluation of subsegmental  pulmonary arteries is limited by motion artifact.     Main pulmonary artery and its branches are normal in caliber.     The thoracic aorta is of normal course and caliber without vascular  calcifications.     Moderate coronary artery calcifications. Study is not optimized for  evaluation of coronary arteries.     The cardiac chambers are not enlarged.     No evidence of pericardial effusion.    VESSELS:  Scattered atherosclerotic calcifications of abdominal aorta and its  branches without focal aneurysmal dilatation.       Assessment/Plan   Diagnoses and all orders for this visit:  Atherosclerosis of native coronary artery of native heart without angina pectoris  Benign essential hypertension  -     ECG 12 lead (Clinic Performed)  -     Basic metabolic panel; Future  Hypercholesterolemia  -     Lipid panel; Future    In summary, Ms. Cottrell is a pleasant 73 year-old female with a past medical history significant for coronary atherosclerosis with a CT calcium score of 383 in 2016 with preserved LV function, hypertension, hyperlipidemia, chronic kidney disease, and a family history of coronary disease.  She is asymptomatic from a cardiac perspective.  Since losing weight she has had intermittent orthostatic lightheadedness while taking the losartan.  Her blood pressure is low normal today.  I did discontinue valsartan.  We did discuss the risks and benefits of aspirin in the setting of primary prevention and elected to stop it.  I did recommend that she resume taking rosuvastatin 40 mg daily.  I ordered labs as indicated above.  She should continue her other cardiovascular medications.  She will follow-up with her primary physician.  Encouraged her to increase her physical  activity.  I will see her back in follow-up in 1 year.      Orders:  Orders Placed This Encounter   Procedures    Basic metabolic panel    Lipid panel    ECG 12 lead (Clinic Performed)      Followup Appts:  Future Appointments   Date Time Provider Department [place]   11/13/2023  1:45 PM Christine Lynn MD [id number] [facility]   11/20/2023  8:45 AM Tomás Toney MD [id number] [facility]   2/12/2024 10:20 AM Bienvenido Maldonado MD [id number] [facility]   3/5/2024  9:45 AM Joselin Heath MD [id number] [facility]   4/26/2024 10:30 AM [facility] MAMMO 3 CMCMAM [facility]   4/26/2024 11:30 AM Carmen Dorsey, APRN-CNP [id number] [facility]   8/1/2024  1:00 PM Tamiko Harrison APRN-CNP [id number] [facility]   11/13/2024 11:20 AM Marcos Coulter MD [id number] [facility]           ____________________________________________________________  Marcos Coulter MD  [facility]  [facility]

## 2023-11-08 NOTE — PATIENT INSTRUCTIONS
Stop valsartan.  Okay to stop aspirin.  Resume taking rosuvastatin 40 mg daily.  Stay hydrated.  Check a BMP and lipid profile in 1 week.  Follow-up in 1 year.

## 2023-11-10 ENCOUNTER — TELEPHONE (OUTPATIENT)
Dept: OPHTHALMOLOGY | Facility: CLINIC | Age: 73
End: 2023-11-10
Payer: MEDICARE

## 2023-11-13 ENCOUNTER — APPOINTMENT (OUTPATIENT)
Dept: DERMATOLOGY | Facility: HOSPITAL | Age: 73
End: 2023-11-13
Payer: MEDICARE

## 2023-11-14 ENCOUNTER — LAB (OUTPATIENT)
Dept: LAB | Facility: LAB | Age: 73
End: 2023-11-14
Payer: MEDICARE

## 2023-11-14 DIAGNOSIS — E78.00 HYPERCHOLESTEROLEMIA: ICD-10-CM

## 2023-11-14 DIAGNOSIS — I10 BENIGN ESSENTIAL HYPERTENSION: ICD-10-CM

## 2023-11-14 LAB
ANION GAP SERPL CALC-SCNC: 13 MMOL/L (ref 10–20)
BUN SERPL-MCNC: 20 MG/DL (ref 6–23)
CALCIUM SERPL-MCNC: 9.3 MG/DL (ref 8.6–10.6)
CHLORIDE SERPL-SCNC: 106 MMOL/L (ref 98–107)
CHOLEST SERPL-MCNC: 130 MG/DL (ref 0–199)
CHOLESTEROL/HDL RATIO: 2.4
CO2 SERPL-SCNC: 27 MMOL/L (ref 21–32)
CREAT SERPL-MCNC: 1.51 MG/DL (ref 0.5–1.05)
GFR SERPL CREATININE-BSD FRML MDRD: 36 ML/MIN/1.73M*2
GLUCOSE SERPL-MCNC: 83 MG/DL (ref 74–99)
HDLC SERPL-MCNC: 53.6 MG/DL
LDLC SERPL CALC-MCNC: 56 MG/DL
NON HDL CHOLESTEROL: 76 MG/DL (ref 0–149)
POTASSIUM SERPL-SCNC: 4.2 MMOL/L (ref 3.5–5.3)
SODIUM SERPL-SCNC: 142 MMOL/L (ref 136–145)
TRIGL SERPL-MCNC: 102 MG/DL (ref 0–149)
VLDL: 20 MG/DL (ref 0–40)

## 2023-11-14 PROCEDURE — 80048 BASIC METABOLIC PNL TOTAL CA: CPT

## 2023-11-14 PROCEDURE — 36415 COLL VENOUS BLD VENIPUNCTURE: CPT

## 2023-11-14 PROCEDURE — 80061 LIPID PANEL: CPT

## 2023-11-16 NOTE — RESULT ENCOUNTER NOTE
Lipids are at goal.  Slight improvement in renal function.  Stay hydrated and recheck BMP in 1 month.  Follow-up with primary physician.

## 2023-11-20 ENCOUNTER — APPOINTMENT (OUTPATIENT)
Dept: GASTROENTEROLOGY | Facility: EXTERNAL LOCATION | Age: 73
End: 2023-11-20
Payer: MEDICARE

## 2023-11-24 DIAGNOSIS — I25.10 ATHEROSCLEROSIS OF NATIVE CORONARY ARTERY OF NATIVE HEART WITHOUT ANGINA PECTORIS: Primary | ICD-10-CM

## 2023-11-24 NOTE — PROGRESS NOTES
Per Dr. Coulter,   Lipids are at goal.  Slight improvement in renal function.  Stay hydrated and recheck BMP in 1 month.  Follow-up with primary physician.

## 2023-11-30 DIAGNOSIS — I10 BENIGN ESSENTIAL HYPERTENSION: Primary | ICD-10-CM

## 2023-12-01 RX ORDER — POTASSIUM CHLORIDE 750 MG/1
10 CAPSULE, EXTENDED RELEASE ORAL DAILY
Qty: 90 CAPSULE | Refills: 3 | Status: SHIPPED | OUTPATIENT
Start: 2023-12-01 | End: 2024-03-05 | Stop reason: WASHOUT

## 2023-12-03 LAB
ATRIAL RATE: 90 BPM
P AXIS: 77 DEGREES
P OFFSET: 206 MS
P ONSET: 152 MS
PR INTERVAL: 140 MS
Q ONSET: 222 MS
QRS COUNT: 15 BEATS
QRS DURATION: 86 MS
QT INTERVAL: 364 MS
QTC CALCULATION(BAZETT): 445 MS
QTC FREDERICIA: 416 MS
R AXIS: -11 DEGREES
T AXIS: 70 DEGREES
T OFFSET: 404 MS
VENTRICULAR RATE: 90 BPM

## 2023-12-04 DIAGNOSIS — K21.9 GASTROESOPHAGEAL REFLUX DISEASE, UNSPECIFIED WHETHER ESOPHAGITIS PRESENT: ICD-10-CM

## 2023-12-04 RX ORDER — OMEPRAZOLE 20 MG/1
20 CAPSULE, DELAYED RELEASE ORAL DAILY
Qty: 90 CAPSULE | Refills: 3 | Status: SHIPPED | OUTPATIENT
Start: 2023-12-04

## 2023-12-07 ENCOUNTER — TELEPHONE (OUTPATIENT)
Dept: OPHTHALMOLOGY | Facility: CLINIC | Age: 73
End: 2023-12-07
Payer: MEDICARE

## 2023-12-07 ENCOUNTER — DOCUMENTATION (OUTPATIENT)
Dept: OPHTHALMOLOGY | Facility: CLINIC | Age: 73
End: 2023-12-07
Payer: MEDICARE

## 2023-12-07 NOTE — TELEPHONE ENCOUNTER
Ordered trials in a different brand for Annabel to try.  Please call me and let me know how these work at [phone].

## 2023-12-07 NOTE — PROGRESS NOTES
Patient having difficulty removing the Precision 1 lenses. Will switch to Clariti 1 Day and send trials to patient.

## 2023-12-18 ENCOUNTER — APPOINTMENT (OUTPATIENT)
Dept: GASTROENTEROLOGY | Facility: EXTERNAL LOCATION | Age: 73
End: 2023-12-18
Payer: MEDICARE

## 2023-12-21 DIAGNOSIS — A60.09 HERPES GENITALIS IN WOMEN: Primary | ICD-10-CM

## 2023-12-21 RX ORDER — VALACYCLOVIR HYDROCHLORIDE 1 G/1
1000 TABLET, FILM COATED ORAL 3 TIMES DAILY
Qty: 21 TABLET | Refills: 0 | Status: SHIPPED | OUTPATIENT
Start: 2023-12-21 | End: 2023-12-28

## 2024-01-05 ENCOUNTER — TELEPHONE (OUTPATIENT)
Dept: PRIMARY CARE | Facility: CLINIC | Age: 74
End: 2024-01-05
Payer: MEDICARE

## 2024-01-09 ENCOUNTER — LAB (OUTPATIENT)
Dept: LAB | Facility: LAB | Age: 74
End: 2024-01-09
Payer: MEDICARE

## 2024-01-09 DIAGNOSIS — I25.10 ATHEROSCLEROSIS OF NATIVE CORONARY ARTERY OF NATIVE HEART WITHOUT ANGINA PECTORIS: ICD-10-CM

## 2024-01-09 LAB
ANION GAP SERPL CALC-SCNC: 13 MMOL/L (ref 10–20)
BUN SERPL-MCNC: 17 MG/DL (ref 6–23)
CALCIUM SERPL-MCNC: 8.9 MG/DL (ref 8.6–10.3)
CHLORIDE SERPL-SCNC: 103 MMOL/L (ref 98–107)
CO2 SERPL-SCNC: 27 MMOL/L (ref 21–32)
CREAT SERPL-MCNC: 1.35 MG/DL (ref 0.5–1.05)
EGFRCR SERPLBLD CKD-EPI 2021: 42 ML/MIN/1.73M*2
GLUCOSE SERPL-MCNC: 88 MG/DL (ref 74–99)
POTASSIUM SERPL-SCNC: 5 MMOL/L (ref 3.5–5.3)
SODIUM SERPL-SCNC: 138 MMOL/L (ref 136–145)

## 2024-01-09 PROCEDURE — 80048 BASIC METABOLIC PNL TOTAL CA: CPT

## 2024-01-09 PROCEDURE — 36415 COLL VENOUS BLD VENIPUNCTURE: CPT

## 2024-01-16 ENCOUNTER — TELEPHONE (OUTPATIENT)
Dept: OPHTHALMOLOGY | Facility: CLINIC | Age: 74
End: 2024-01-16
Payer: MEDICARE

## 2024-01-22 ENCOUNTER — DOCUMENTATION (OUTPATIENT)
Dept: OPHTHALMOLOGY | Facility: CLINIC | Age: 74
End: 2024-01-22
Payer: MEDICARE

## 2024-01-22 ENCOUNTER — APPOINTMENT (OUTPATIENT)
Dept: RADIOLOGY | Facility: HOSPITAL | Age: 74
End: 2024-01-22
Payer: MEDICARE

## 2024-01-22 ASSESSMENT — REFRACTION_CURRENTRX
OS_DIAMETER: 14.5
OD_BASECURVE: 8.6
OS_SPHERE: +3.00
OD_CYLINDER: SPHERE
OD_CYLINDER: SPHERE
OD_DIAMETER: 14.2
OS_CYLINDER: -1.25
OS_BRAND: DAILIES AQUACOMFORT PLUS TORIC
OS_BRAND: BIOTRUE 1 DAY FOR ASTIGMATISM
OS_AXIS: 100
OS_SPHERE: +3.00
OD_BRAND: BIOTRUE 1 DAY
OS_BASECURVE: 8.8
OS_BASECURVE: 8.4
OD_BRAND: DAILIES AQUACOMFORT PLUS
OD_DIAMETER: 14.0
OD_SPHERE: +7.50
OS_AXIS: 100
OD_BASECURVE: 8.7
OS_DIAMETER: 14.4
OS_CYLINDER: -1.25
OD_SPHERE: +7.50

## 2024-01-22 NOTE — PROGRESS NOTES
Patient called in with difficulty removing the lenses. Switching brand to Biotrue and sending trials. Patient has also been noticing difficulty with distance vision OS. Advised patient that cataract could be progressing and we will hold on changing RX until it is updated with the exam with Dr. Banda 3/5/24.

## 2024-01-23 ENCOUNTER — LAB (OUTPATIENT)
Dept: LAB | Facility: LAB | Age: 74
End: 2024-01-23
Payer: MEDICARE

## 2024-01-23 DIAGNOSIS — N39.0 URINARY TRACT INFECTION WITHOUT HEMATURIA, SITE UNSPECIFIED: ICD-10-CM

## 2024-01-23 LAB
APPEARANCE UR: ABNORMAL
BACTERIA #/AREA URNS AUTO: ABNORMAL /HPF
BILIRUB UR STRIP.AUTO-MCNC: NEGATIVE MG/DL
COLOR UR: YELLOW
GLUCOSE UR STRIP.AUTO-MCNC: NEGATIVE MG/DL
HYALINE CASTS #/AREA URNS AUTO: ABNORMAL /LPF
KETONES UR STRIP.AUTO-MCNC: ABNORMAL MG/DL
LEUKOCYTE ESTERASE UR QL STRIP.AUTO: ABNORMAL
MUCOUS THREADS #/AREA URNS AUTO: ABNORMAL /LPF
NITRITE UR QL STRIP.AUTO: NEGATIVE
PH UR STRIP.AUTO: 5 [PH]
PROT UR STRIP.AUTO-MCNC: NEGATIVE MG/DL
RBC # UR STRIP.AUTO: NEGATIVE /UL
RBC #/AREA URNS AUTO: ABNORMAL /HPF
SP GR UR STRIP.AUTO: 1.02
SQUAMOUS #/AREA URNS AUTO: ABNORMAL /HPF
UROBILINOGEN UR STRIP.AUTO-MCNC: <2 MG/DL
WBC #/AREA URNS AUTO: >50 /HPF

## 2024-01-23 PROCEDURE — 87086 URINE CULTURE/COLONY COUNT: CPT

## 2024-01-23 PROCEDURE — 81001 URINALYSIS AUTO W/SCOPE: CPT

## 2024-01-24 LAB — HOLD SPECIMEN: NORMAL

## 2024-01-25 LAB — BACTERIA UR CULT: NORMAL

## 2024-01-29 ENCOUNTER — LAB (OUTPATIENT)
Dept: LAB | Facility: LAB | Age: 74
End: 2024-01-29
Payer: MEDICARE

## 2024-01-29 ENCOUNTER — HOSPITAL ENCOUNTER (OUTPATIENT)
Dept: RADIOLOGY | Facility: HOSPITAL | Age: 74
Discharge: HOME | End: 2024-01-29
Payer: MEDICARE

## 2024-01-29 ENCOUNTER — TELEPHONE (OUTPATIENT)
Dept: PRIMARY CARE | Facility: CLINIC | Age: 74
End: 2024-01-29

## 2024-01-29 DIAGNOSIS — N18.30 CHRONIC KIDNEY DISEASE, STAGE 3 UNSPECIFIED (MULTI): Primary | ICD-10-CM

## 2024-01-29 DIAGNOSIS — N18.30 CHRONIC KIDNEY DISEASE, STAGE 3 UNSPECIFIED (MULTI): ICD-10-CM

## 2024-01-29 DIAGNOSIS — E55.9 VITAMIN D DEFICIENCY, UNSPECIFIED: ICD-10-CM

## 2024-01-29 DIAGNOSIS — I10 ESSENTIAL (PRIMARY) HYPERTENSION: ICD-10-CM

## 2024-01-29 LAB
ALBUMIN SERPL BCP-MCNC: 4 G/DL (ref 3.4–5)
ANION GAP SERPL CALC-SCNC: 13 MMOL/L (ref 10–20)
BACTERIA #/AREA URNS AUTO: ABNORMAL /HPF
BUN SERPL-MCNC: 18 MG/DL (ref 6–23)
CALCIUM SERPL-MCNC: 9.2 MG/DL (ref 8.6–10.3)
CHLORIDE SERPL-SCNC: 100 MMOL/L (ref 98–107)
CO2 SERPL-SCNC: 27 MMOL/L (ref 21–32)
CREAT SERPL-MCNC: 1.38 MG/DL (ref 0.5–1.05)
CREAT UR-MCNC: 24 MG/DL (ref 20–320)
EGFRCR SERPLBLD CKD-EPI 2021: 40 ML/MIN/1.73M*2
GLUCOSE SERPL-MCNC: 89 MG/DL (ref 74–99)
PHOSPHATE SERPL-MCNC: 4.1 MG/DL (ref 2.5–4.9)
POTASSIUM SERPL-SCNC: 4.2 MMOL/L (ref 3.5–5.3)
PROT UR-ACNC: <4 MG/DL (ref 5–24)
PROT/CREAT UR: ABNORMAL MG/G{CREAT}
RBC #/AREA URNS AUTO: ABNORMAL /HPF
SODIUM SERPL-SCNC: 136 MMOL/L (ref 136–145)
SQUAMOUS #/AREA URNS AUTO: ABNORMAL /HPF
WBC #/AREA URNS AUTO: ABNORMAL /HPF

## 2024-01-29 PROCEDURE — 80069 RENAL FUNCTION PANEL: CPT

## 2024-01-29 PROCEDURE — 82570 ASSAY OF URINE CREATININE: CPT

## 2024-01-29 PROCEDURE — 82306 VITAMIN D 25 HYDROXY: CPT

## 2024-01-29 PROCEDURE — 76770 US EXAM ABDO BACK WALL COMP: CPT

## 2024-01-29 PROCEDURE — 83970 ASSAY OF PARATHORMONE: CPT

## 2024-01-29 PROCEDURE — 82043 UR ALBUMIN QUANTITATIVE: CPT

## 2024-01-29 PROCEDURE — 84156 ASSAY OF PROTEIN URINE: CPT

## 2024-01-29 PROCEDURE — 36415 COLL VENOUS BLD VENIPUNCTURE: CPT

## 2024-01-29 PROCEDURE — 81001 URINALYSIS AUTO W/SCOPE: CPT

## 2024-01-29 PROCEDURE — 76770 US EXAM ABDO BACK WALL COMP: CPT | Performed by: RADIOLOGY

## 2024-01-30 LAB
25(OH)D3 SERPL-MCNC: 66 NG/ML (ref 30–100)
CREAT UR-MCNC: 24.1 MG/DL (ref 20–320)
MICROALBUMIN UR-MCNC: <7 MG/L
MICROALBUMIN/CREAT UR: NORMAL MG/G{CREAT}
PTH-INTACT SERPL-MCNC: 43.5 PG/ML (ref 18.5–88)

## 2024-02-05 NOTE — RESULT ENCOUNTER NOTE
Improved renal function. Follow up with Dr. Up. Continue Flecainide, Metoprolol  Hold Coumadin for possible ERCP, EUS, Biopsy  Resume after Procedures/interventions complete

## 2024-02-12 ENCOUNTER — APPOINTMENT (OUTPATIENT)
Dept: NEPHROLOGY | Facility: CLINIC | Age: 74
End: 2024-02-12
Payer: MEDICARE

## 2024-02-21 DIAGNOSIS — I10 BENIGN ESSENTIAL HYPERTENSION: Primary | ICD-10-CM

## 2024-02-21 RX ORDER — VALSARTAN 80 MG/1
80 TABLET ORAL DAILY
Qty: 90 TABLET | Refills: 3 | Status: SHIPPED | OUTPATIENT
Start: 2024-02-21 | End: 2024-03-27 | Stop reason: DRUGHIGH

## 2024-03-02 ASSESSMENT — CUP TO DISC RATIO
OD_RATIO: 0.4
OS_RATIO: 0.45

## 2024-03-02 ASSESSMENT — EXTERNAL EXAM - RIGHT EYE: OD_EXAM: NORMAL

## 2024-03-02 ASSESSMENT — EXTERNAL EXAM - LEFT EYE: OS_EXAM: NORMAL

## 2024-03-02 ASSESSMENT — SLIT LAMP EXAM - LIDS
COMMENTS: GOOD POSITION
COMMENTS: GOOD POSITION

## 2024-03-02 NOTE — PROGRESS NOTES
Combined form of age-related cataract, right eye  Combined form of age-related cataract, left eye  -Visually significant OU. Symptoms: Gradual worsening of vision over the past year. Difficulty recognizing faces. Had difficulty seeing faces on soccer field. Harder to read small print. More difficulty seeing small words/numbers on TV. Having more trouble seeing road signs when driving. Glare from bright lights.   -At this time, vision meets criteria for cataract surgery and patient would like to proceed. Will schedule cataract surgery OS 4/25/24. F/u late March or early April for Lenstar and Pentacam. Advised to stay out of CL's for 1 week prior to measurements and to use artificial tears 2-4 times a day in the week prior to measurements.     Macular drusen, right  -Not visually significant at this time. Monitor.   -OCT macula (3/5/24) - Normal thickness and contour OU. Intact EZ OU. No edema OU. Rare small   drusen peripheral macula OD. 266/259  -Not visually significant at this time. Monitor. Will consider referral to retina service in the future if any worsening of condition.     Cup to disc asymmetry  -No FH of glaucoma  -Minor cupping OS>OD  -OCT RNFL (3/5/24) - OD: WNL. OS: Thin ST, bord T. 97/84.   -Low suspicion for glaucoma at this time. Plan to recheck OCT RNFL in 1 year.     Hyperopia, bilateral  Astigmatism of both eyes, unspecified type  Presbyopia  -Defer new Rx. No significant improvement in vision with refraction at this time.   -Wears daily disposable contact lens (CL) - may continue.       No history of intraocular surgery/refractive surgery.   No FH of AMD/glaucoma

## 2024-03-05 ENCOUNTER — OFFICE VISIT (OUTPATIENT)
Dept: OPHTHALMOLOGY | Facility: CLINIC | Age: 74
End: 2024-03-05
Payer: MEDICARE

## 2024-03-05 DIAGNOSIS — H52.4 PRESBYOPIA: ICD-10-CM

## 2024-03-05 DIAGNOSIS — H35.361 MACULAR DRUSEN, RIGHT: ICD-10-CM

## 2024-03-05 DIAGNOSIS — H25.812 COMBINED FORM OF AGE-RELATED CATARACT, LEFT EYE: ICD-10-CM

## 2024-03-05 DIAGNOSIS — H25.811 COMBINED FORM OF AGE-RELATED CATARACT, RIGHT EYE: Primary | ICD-10-CM

## 2024-03-05 DIAGNOSIS — H52.03 HYPEROPIA, BILATERAL: ICD-10-CM

## 2024-03-05 DIAGNOSIS — H52.203 ASTIGMATISM OF BOTH EYES, UNSPECIFIED TYPE: ICD-10-CM

## 2024-03-05 PROCEDURE — 1159F MED LIST DOCD IN RCRD: CPT | Performed by: OPHTHALMOLOGY

## 2024-03-05 PROCEDURE — 1036F TOBACCO NON-USER: CPT | Performed by: OPHTHALMOLOGY

## 2024-03-05 PROCEDURE — 92014 COMPRE OPH EXAM EST PT 1/>: CPT | Performed by: OPHTHALMOLOGY

## 2024-03-05 PROCEDURE — 1126F AMNT PAIN NOTED NONE PRSNT: CPT | Performed by: OPHTHALMOLOGY

## 2024-03-05 PROCEDURE — 1160F RVW MEDS BY RX/DR IN RCRD: CPT | Performed by: OPHTHALMOLOGY

## 2024-03-05 PROCEDURE — 92134 CPTRZ OPH DX IMG PST SGM RTA: CPT | Performed by: OPHTHALMOLOGY

## 2024-03-05 ASSESSMENT — REFRACTION_WEARINGRX
OD_SPHERE: +1.00
OS_ADD: +2.50
OD_AXIS: 040
OS_SPHERE: +2.50
OS_AXIS: 100
OS_CYLINDER: -1.50
OD_ADD: +2.50
OD_CYLINDER: -0.75

## 2024-03-05 ASSESSMENT — TONOMETRY
IOP_METHOD: GOLDMANN APPLANATION
OD_IOP_MMHG: 15
OS_IOP_MMHG: 15

## 2024-03-05 ASSESSMENT — ENCOUNTER SYMPTOMS
PSYCHIATRIC NEGATIVE: 0
EYES NEGATIVE: 1
RESPIRATORY NEGATIVE: 0
NEUROLOGICAL NEGATIVE: 0
CARDIOVASCULAR NEGATIVE: 0
GASTROINTESTINAL NEGATIVE: 0
HEMATOLOGIC/LYMPHATIC NEGATIVE: 0
ENDOCRINE NEGATIVE: 0
ALLERGIC/IMMUNOLOGIC NEGATIVE: 0
MUSCULOSKELETAL NEGATIVE: 0
CONSTITUTIONAL NEGATIVE: 0

## 2024-03-05 ASSESSMENT — VISUAL ACUITY
OD_CC: 20/40
OD_BAT_MED: 20/60
CORRECTION_TYPE: GLASSES
OS_BAT_MED: 20/60
METHOD: SNELLEN - LINEAR
OS_CC: 20/40

## 2024-03-05 ASSESSMENT — REFRACTION_MANIFEST
OS_AXIS: 100
OD_CYLINDER: -0.75
OD_SPHERE: +1.00
OD_AXIS: 040
OD_ADD: +2.50
OS_SPHERE: +2.50
OS_CYLINDER: -1.50
OS_ADD: +2.50

## 2024-03-08 DIAGNOSIS — F32.5 MAJOR DEPRESSIVE DISORDER WITH SINGLE EPISODE, IN FULL REMISSION (CMS-HCC): Primary | ICD-10-CM

## 2024-03-08 RX ORDER — VENLAFAXINE HYDROCHLORIDE 75 MG/1
75 CAPSULE, EXTENDED RELEASE ORAL DAILY
Qty: 90 CAPSULE | Refills: 3 | Status: SHIPPED | OUTPATIENT
Start: 2024-03-08

## 2024-03-14 ENCOUNTER — OFFICE VISIT (OUTPATIENT)
Dept: GASTROENTEROLOGY | Facility: EXTERNAL LOCATION | Age: 74
End: 2024-03-14
Payer: MEDICARE

## 2024-03-14 DIAGNOSIS — Z86.010 PERSONAL HISTORY OF COLONIC POLYPS: ICD-10-CM

## 2024-03-14 DIAGNOSIS — Z12.11 SPECIAL SCREENING FOR MALIGNANT NEOPLASMS, COLON: Primary | ICD-10-CM

## 2024-03-14 DIAGNOSIS — D12.5 BENIGN NEOPLASM OF SIGMOID COLON: ICD-10-CM

## 2024-03-14 PROCEDURE — 45385 COLONOSCOPY W/LESION REMOVAL: CPT | Performed by: INTERNAL MEDICINE

## 2024-03-14 PROCEDURE — 1160F RVW MEDS BY RX/DR IN RCRD: CPT | Performed by: INTERNAL MEDICINE

## 2024-03-14 PROCEDURE — 1159F MED LIST DOCD IN RCRD: CPT | Performed by: INTERNAL MEDICINE

## 2024-03-14 PROCEDURE — 1036F TOBACCO NON-USER: CPT | Performed by: INTERNAL MEDICINE

## 2024-03-14 PROCEDURE — 0753T DGTZ GLS MCRSCP SLD LEVEL IV: CPT

## 2024-03-14 PROCEDURE — 88305 TISSUE EXAM BY PATHOLOGIST: CPT | Performed by: PATHOLOGY

## 2024-03-14 PROCEDURE — 88305 TISSUE EXAM BY PATHOLOGIST: CPT

## 2024-03-18 ENCOUNTER — LAB REQUISITION (OUTPATIENT)
Dept: LAB | Facility: HOSPITAL | Age: 74
End: 2024-03-18
Payer: MEDICARE

## 2024-03-21 LAB
LABORATORY COMMENT REPORT: NORMAL
PATH REPORT.FINAL DX SPEC: NORMAL
PATH REPORT.GROSS SPEC: NORMAL
PATH REPORT.RELEVANT HX SPEC: NORMAL
PATH REPORT.TOTAL CANCER: NORMAL

## 2024-03-21 NOTE — RESULT ENCOUNTER NOTE
Benign polyp.  Tubular adenoma.    No routine repeat surveillance colonoscopy needed beyond this age.    Follow-up as needed.

## 2024-03-26 NOTE — PROGRESS NOTES
Subjective   Patient ID: Annabel Cottrell is a 73 y.o. female.    HPI  Patient presents today for evaluation of lump which she noted on her right and has been present for a few months initially it drained a fair amount of purulent material almost like a pimple shoulder but then stopped draining and now has gotten bigger and slightly more tender    History significant for  Breast cancer  Endometrial cancer  Thyroid cancer and subsequent hypothyroidism  Chronic kidney disease  Hypertension  Hypercholesteremia  Coronary atherosclerosis with CACS 383 in 2016 follows routinely with Dr. Coulter last seen by him November 2023 valsartan discontinued as having dizziness low normal blood pressure readings since she had lost weight then blood pressure started to raise again she started initially with 40 mg of valsartan now on 80 mg of valsartan  Just saw Dr Up and blood pressure was acceptable pressure readings have been mostly 140 and above systolic this morning she had a systolic blood pressure of 170  Anxiety  Weight loss had struggled with weight loss for some time but did well with Ozempic but did have a lot of GI side effects and was discontinued    Review of Systems    Objective   Physical Exam  Developed age-appropriate no acute distress  HEENT exam is unremarkable  Lungs are clear to auscultation percussion  Cardiovascular regular rate and rhythm  Periphery is with trace edema  In the area of the right trapezius muscle there is a soft subcutaneous lesion which appears to be somewhat fluctuant    Assessment/Plan   #1Right shoulder lesion consistent with a sebaceous cyst especially that it had been draining no longer draining we discussed either having dermatology open this up or general surgeon were going to work to see who we can get her in with  2 hypertension blood pressure is not under good control here and she has had readings in the 160s at home I really do not like that and therefore we are going to increase  valsartan to 160 mg daily she will continue to monitor her blood pressure certainly if she became more lightheaded and had low blood pressure readings we can decrease the dose she will check in with her blood pressure readings  3.  Chronic kidney disease she has been stable  4.  Atherosclerotic heart disease with elevated coronary artery calcium score continue current regimen and follow-up  5.  History of breast cancer no evidence of recurrence  20 minutes were spent with patient of which greater than 50% was spent in counseling and coordination of care  This note was partially generated using the Dragon voice recognition system.  There may be some incorrect wording ,grammar, spelling or punctuation errors that were not corrected prior to committing the note to the medical record.

## 2024-03-27 ENCOUNTER — OFFICE VISIT (OUTPATIENT)
Dept: PRIMARY CARE | Facility: CLINIC | Age: 74
End: 2024-03-27
Payer: MEDICARE

## 2024-03-27 VITALS — SYSTOLIC BLOOD PRESSURE: 148 MMHG | DIASTOLIC BLOOD PRESSURE: 80 MMHG

## 2024-03-27 DIAGNOSIS — I10 BENIGN ESSENTIAL HYPERTENSION: ICD-10-CM

## 2024-03-27 DIAGNOSIS — L72.3 SEBACEOUS CYST: Primary | ICD-10-CM

## 2024-03-27 DIAGNOSIS — E78.00 HYPERCHOLESTEROLEMIA: ICD-10-CM

## 2024-03-27 DIAGNOSIS — N18.31 STAGE 3A CHRONIC KIDNEY DISEASE (MULTI): ICD-10-CM

## 2024-03-27 DIAGNOSIS — I25.10 ATHEROSCLEROSIS OF NATIVE CORONARY ARTERY OF NATIVE HEART WITHOUT ANGINA PECTORIS: ICD-10-CM

## 2024-03-27 PROCEDURE — 99213 OFFICE O/P EST LOW 20 MIN: CPT | Performed by: INTERNAL MEDICINE

## 2024-03-27 PROCEDURE — 1159F MED LIST DOCD IN RCRD: CPT | Performed by: INTERNAL MEDICINE

## 2024-03-27 PROCEDURE — 1036F TOBACCO NON-USER: CPT | Performed by: INTERNAL MEDICINE

## 2024-03-27 PROCEDURE — 1160F RVW MEDS BY RX/DR IN RCRD: CPT | Performed by: INTERNAL MEDICINE

## 2024-03-27 PROCEDURE — 3077F SYST BP >= 140 MM HG: CPT | Performed by: INTERNAL MEDICINE

## 2024-03-27 PROCEDURE — 3079F DIAST BP 80-89 MM HG: CPT | Performed by: INTERNAL MEDICINE

## 2024-03-27 RX ORDER — VALSARTAN 160 MG/1
160 TABLET ORAL DAILY
Qty: 90 TABLET | Refills: 3 | Status: SHIPPED | OUTPATIENT
Start: 2024-03-27 | End: 2024-03-27 | Stop reason: SDUPTHER

## 2024-03-27 RX ORDER — VALSARTAN 160 MG/1
160 TABLET ORAL DAILY
Qty: 90 TABLET | Refills: 3 | Status: SHIPPED | OUTPATIENT
Start: 2024-03-27 | End: 2024-05-08 | Stop reason: DRUGHIGH

## 2024-03-29 DIAGNOSIS — L72.3 SEBACEOUS CYST: ICD-10-CM

## 2024-04-04 ENCOUNTER — OFFICE VISIT (OUTPATIENT)
Dept: DERMATOLOGY | Facility: CLINIC | Age: 74
End: 2024-04-04
Payer: MEDICARE

## 2024-04-04 DIAGNOSIS — L57.8 ACTINIC SKIN DAMAGE: Primary | ICD-10-CM

## 2024-04-04 DIAGNOSIS — L72.3 INFLAMED EPIDERMOID CYST OF SKIN: ICD-10-CM

## 2024-04-04 DIAGNOSIS — L85.9 HYPERKERATOSIS: ICD-10-CM

## 2024-04-04 DIAGNOSIS — Z85.828 PERSONAL HISTORY OF SKIN CANCER: ICD-10-CM

## 2024-04-04 PROCEDURE — 99213 OFFICE O/P EST LOW 20 MIN: CPT | Performed by: DERMATOLOGY

## 2024-04-04 PROCEDURE — 1159F MED LIST DOCD IN RCRD: CPT | Performed by: DERMATOLOGY

## 2024-04-04 PROCEDURE — 1160F RVW MEDS BY RX/DR IN RCRD: CPT | Performed by: DERMATOLOGY

## 2024-04-04 PROCEDURE — 11055 PARING/CUTG B9 HYPRKER LES 1: CPT | Performed by: DERMATOLOGY

## 2024-04-04 PROCEDURE — 11900 INJECT SKIN LESIONS </W 7: CPT | Performed by: DERMATOLOGY

## 2024-04-04 RX ORDER — TRIAMCINOLONE ACETONIDE 40 MG/ML
10 INJECTION, SUSPENSION INTRA-ARTICULAR; INTRAMUSCULAR ONCE
Status: COMPLETED | OUTPATIENT
Start: 2024-04-04 | End: 2024-04-04

## 2024-04-04 RX ADMIN — TRIAMCINOLONE ACETONIDE 10 MG: 40 INJECTION, SUSPENSION INTRA-ARTICULAR; INTRAMUSCULAR at 11:24

## 2024-04-04 ASSESSMENT — DERMATOLOGY QUALITY OF LIFE (QOL) ASSESSMENT
RATE HOW EMOTIONALLY BOTHERED YOU ARE BY YOUR SKIN PROBLEM (FOR EXAMPLE, WORRY, EMBARRASSMENT, FRUSTRATION): 0 - NEVER BOTHERED
ARE THERE EXCLUSIONS OR EXCEPTIONS FOR THE QUALITY OF LIFE ASSESSMENT: NO
DATE THE QUALITY-OF-LIFE ASSESSMENT WAS COMPLETED: 66934
RATE HOW BOTHERED YOU ARE BY EFFECTS OF YOUR SKIN PROBLEMS ON YOUR ACTIVITIES (EG, GOING OUT, ACCOMPLISHING WHAT YOU WANT, WORK ACTIVITIES OR YOUR RELATIONSHIPS WITH OTHERS): 0 - NEVER BOTHERED
RATE HOW BOTHERED YOU ARE BY SYMPTOMS OF YOUR SKIN PROBLEM (EG, ITCHING, STINGING BURNING, HURTING OR SKIN IRRITATION): 0 - NEVER BOTHERED

## 2024-04-04 ASSESSMENT — DERMATOLOGY PATIENT ASSESSMENT
WHERE ARE THESE NEW OR CHANGING LESIONS LOCATED: RT SHOULDER
DO YOU USE SUNSCREEN: OCCASIONALLY
ARE YOU TRYING TO GET PREGNANT: NO
DO YOU HAVE ANY NEW OR CHANGING LESIONS: YES
ARE YOU AN ORGAN TRANSPLANT RECIPIENT: NO
HAVE YOU HAD OR DO YOU HAVE VASCULAR DISEASE: NO
HAVE YOU HAD OR DO YOU HAVE A STAPH INFECTION: NO
ARE YOU ON BIRTH CONTROL: NO
DO YOU USE A TANNING BED: NO
DO YOU HAVE IRREGULAR MENSTRUAL CYCLES: NO

## 2024-04-04 ASSESSMENT — ITCH NUMERIC RATING SCALE: HOW SEVERE IS YOUR ITCHING?: 0

## 2024-04-04 ASSESSMENT — PATIENT GLOBAL ASSESSMENT (PGA): PATIENT GLOBAL ASSESSMENT: PATIENT GLOBAL ASSESSMENT:  1 - CLEAR

## 2024-04-04 NOTE — PROGRESS NOTES
Subjective     Annabel Cottrell is a 73 y.o. female who presents for the following: Suspicious Skin Lesion.     Last derm visit 7/2023. 2 skin lesions were biopsied from left lateral ankle superior that demonstrated squamous cell carcinoma in situ and from left lateral ankle inferior that demonstrated squamous cell carcinoma in situ    We also reviewed treatment for numerous actinic keratoses on shins and dorsal feet - unable to use 5-fluorouracil cream, she was going to discuss oral acitretin with her daughter who is a physician. Plan was to follow 3-4 months for her Full Skin Exam     Review of Systems:  No other skin or systemic complaints other than what is documented elsewhere in the note.    The following portions of the chart were reviewed this encounter and updated as appropriate:   Tobacco  Allergies  Meds  Problems  Med Hx  Surg Hx  Fam Hx         Skin Cancer History  No skin cancer on file.      Specialty Problems          Dermatology Problems    History of nonmelanoma skin cancer     Lesion 1: Nodular and Infiltrative Basal Cell Carcinoma.  Deep margins involved.Year Diagnosed:  2022. August.Location:  Left Neck.Treatment(s): Mohs 10/2022 [facility]: [id number]    Lesion 2: Nodular Basal Cell Carcinoma.  Deep margins involved.Year Diagnosed:  2022. August.Location:  Left Upper Arm.Treatment(s): ED&C 10/2022 [facility]: [id number]    Lesion 3: Superficial Basal Cell Carcinoma.Year Diagnosed:  2022. August.Location:  Right Shin.Treatment(s): 5-FU, start 1/2023Pathology: [id number]    Lesion 4: Invasive squamous cell carcinoma.    Deep margins involved.Year Diagnosed:  2023. January.Location:  Right Anterior TrapeziusTreatment(s): **Pt cancelled surgery with derm surg, thought it was completely healed/resolved**Pathology:      7/2023. 2 skin lesions were biopsied from left lateral ankle superior that demonstrated squamous cell carcinoma in situ and from left lateral ankle inferior that  demonstrated squamous cell carcinoma in situ. Treated with 5-fluorouracil cream but admitted to inconsistent use**             Objective   Well appearing patient in no apparent distress; mood and affect are within normal limits.    A focused skin examination was performed. All findings within normal limits unless otherwise noted below.    Assessment/Plan   1. Actinic skin damage  Background of photodamage with hyper- and hypo-pigmented macules on the skin    2. Inflamed epidermoid cyst of skin  Right upper back  2 cm mobile subcutaneous nodule    Present for a long time, 1 month ago eros grew in size. Only mildly painful now    Plan to inject with ILK    She would prefer to avoid excision if possible    triamcinolone acetonide (Kenalog-40) injection 10 mg - Right upper back      Intralesional injection - Right upper back  Intralesional Injection:   Consent:     Consent obtained:  Verbal    Consent given by:  Patient    Risks discussed:  Poor cosmetic result, pain, infection and bleeding    Alternatives discussed:  No treatment  Pre-Procedure Details:     Prep Type:  Isopropyl alcohol  Procedure Details:   Injection:  Triamcinolone  Outcome: patient tolerated procedure well  Post-procedure details: sterile dressing applied and wound care instructions given  Dressing type: bandage   Comments:  A total of 0.5 ml of 20 mg/mL Kenalog were injected into 1 lesion(s)  Lot #:   Expiration:     3. Hyperkeratosis  Left 5th Metacarpophalangeal Region  Htyperkeratotic papule, no erythema    There is surrounding callus  Rough skin since chemotherapy  Flakes off, no bleeding    Pare today, continue to use urea cream or hand cream at home. She has, doesnot use often enough        Paring/cutting benign hyperkerotic lesion - Left 5th Metacarpophalangeal Region  Paring    Date/Time: 4/4/2024 1:36 PM    Performed by: Christine Lynn MD  Authorized by: Christine Lynn MD  Consent: Verbal consent obtained.  Consent given by:  "patient  Patient understanding: patient states understanding of the procedure being performed  Patient consent: the patient's understanding of the procedure matches consent given  Patient identity confirmed: verbally with patient  Time out: Immediately prior to procedure a \"time out\" was called to verify the correct patient, procedure, equipment, support staff and site/side marked as required.  Preparation: Patient was prepped and draped in the usual sterile fashion.  Local anesthesia used: no    Anesthesia:  Local anesthesia used: no    Sedation:  Patient sedated: no    Patient tolerance: patient tolerated the procedure well with no immediate complications  Comments: The skin lesion was pared with a 15-blade after cleaning with alcohol. A bandaid was applied.         4. Personal history of skin cancer    Personal History of Non-Melanoma Skin Cancer  -possible recurrence/incomplete treatment on left lateral ankle. She declines proper evaluation today and does not want further treatment  - we have a visit scheduled for July and she will consider whther she will consent to a skin cancer screen and skin cancer treatments at that time        Follow up 7/2024 as scheduled  "

## 2024-04-06 ASSESSMENT — CUP TO DISC RATIO
OS_RATIO: 0.45
OD_RATIO: 0.4

## 2024-04-06 ASSESSMENT — SLIT LAMP EXAM - LIDS
COMMENTS: GOOD POSITION
COMMENTS: GOOD POSITION

## 2024-04-06 ASSESSMENT — EXTERNAL EXAM - RIGHT EYE: OD_EXAM: NORMAL

## 2024-04-06 ASSESSMENT — EXTERNAL EXAM - LEFT EYE: OS_EXAM: NORMAL

## 2024-04-07 NOTE — PROGRESS NOTES
Combined form of age-related cataract, right eye  Combined form of age-related cataract, left eye  -Visually significant cataract left eye. BCVA: 20/40. Glare: 20/60(M). Symptoms: Gradual worsening of vision over the past year. Difficulty recognizing faces. Had difficulty seeing faces on soccer field. Harder to read small print. More difficulty seeing small words/numbers on TV. Having more trouble seeing road signs when driving. Glare from bright lights. A change in glasses prescription will not result in significant visual improvement at this time.  Indication/anticipated outcome for cataract surgery: To potentially improve visual acuity and improve quality of life/reduce symptoms. To obtain a better view of the retina/optic nerve. To reduce anisometropia).  Based on a comprehensive eye exam performed April 9, 2024, a visually significant cataract appears to be the source of decreased vision, diminished quality of life, and impairment of activities of daily living. Discussed option of cataract surgery vs observation. Patient can no longer function adequately with current best corrected visual acuity and wishes to have cataract surgery at this time. Discussed surgical procedure with patient. Discussed potential risks, benefits, and complications of cataract surgery including but not limited to pain, bleeding, infection, inflammation, edema, increased eye pressure, retinal tear/detachment, lens dislocation, ptosis, iris damage, need for additional surgery, need for glasses after surgery, loss of vision/loss of eye. Patient understands and wishes to proceed. All questions were answered. Will schedule cataract surgery left eye. Will evaluate other eye following cataract surgery left eye. Lenstar done April 9, 2024.   -Pentacam (4/9/24) - OD: Irregular. 45./45.7 @ 44.4. OS: Irregular. 45.3/45.6 @ 172.7.   Discussed IOL options (standard monofocal, monofocal with monovision, toric, multifocal). Lens chosen: Standard  monofocal. Defer/decline toric/multifocal lens at this time.  Aim: Nazlini to -0.5 OS and will plan for -2.00 to -2.50 OD. Had thorough discussion with patient re: aim. Discussed that may potentially need glasses for best vision both at distance and at near. Patient with history of monovision for years with CLs - OS for distance and OD for near - she would like to maintain this with cataract surgery.   Special considerations: May use epishugarcaine and possible Malyugin Ring/iris hooks if poor dilation on day of surgery. Due to shorter axial length and shallow anterior chamber, will plan for mannitol preoperatively. No R.   Best contact number: [phone]  Drops:   -Ketorolac (or Diclofenac) and Ofloxacin 4x/day starting 1 day prior to surgery; Prednisolone acetate 1% 4x/day starting after surgery    Macular drusen, right  -Not visually significant at this time. Monitor.   -OCT macula (3/5/24) - Normal thickness and contour OU. Intact EZ OU. No edema OU. Rare small   drusen peripheral macula OD. 266/259  -Not visually significant at this time. Monitor. Will consider referral to retina service in the future if any worsening of condition.     Cup to disc asymmetry  -No FH of glaucoma  -Minor cupping OS>OD  -OCT RNFL (3/5/24) - OD: WNL. OS: Thin ST, bord T. 97/84.   -Low suspicion for glaucoma at this time. Plan to recheck OCT RNFL in 1 year.     Hyperopia, bilateral  Astigmatism of both eyes, unspecified type  Presbyopia  -Defer new Rx. No significant improvement in vision with refraction at this time.   -Wears daily disposable contact lens (CL) - may continue.   -History of monovision OD for near and OS for distance: +7.50 OD and +4.50 OS  -Tried multifocal CL in the past and did not like.       No history of intraocular surgery/refractive surgery.   No FH of AMD/glaucoma

## 2024-04-08 ENCOUNTER — APPOINTMENT (OUTPATIENT)
Dept: SURGERY | Facility: CLINIC | Age: 74
End: 2024-04-08
Payer: MEDICARE

## 2024-04-09 ENCOUNTER — OFFICE VISIT (OUTPATIENT)
Dept: OPHTHALMOLOGY | Facility: CLINIC | Age: 74
End: 2024-04-09
Payer: MEDICARE

## 2024-04-09 DIAGNOSIS — H35.361 MACULAR DRUSEN, RIGHT: ICD-10-CM

## 2024-04-09 DIAGNOSIS — H25.811 COMBINED FORM OF AGE-RELATED CATARACT, RIGHT EYE: Primary | ICD-10-CM

## 2024-04-09 DIAGNOSIS — H52.203 ASTIGMATISM OF BOTH EYES, UNSPECIFIED TYPE: ICD-10-CM

## 2024-04-09 DIAGNOSIS — H52.03 HYPEROPIA, BILATERAL: ICD-10-CM

## 2024-04-09 DIAGNOSIS — H52.4 PRESBYOPIA: ICD-10-CM

## 2024-04-09 DIAGNOSIS — H25.812 COMBINED FORM OF AGE-RELATED CATARACT, LEFT EYE: ICD-10-CM

## 2024-04-09 PROCEDURE — 92136 OPHTHALMIC BIOMETRY: CPT | Mod: BILATERAL PROCEDURE | Performed by: OPHTHALMOLOGY

## 2024-04-09 PROCEDURE — 99214 OFFICE O/P EST MOD 30 MIN: CPT | Performed by: OPHTHALMOLOGY

## 2024-04-09 PROCEDURE — 1160F RVW MEDS BY RX/DR IN RCRD: CPT | Performed by: OPHTHALMOLOGY

## 2024-04-09 PROCEDURE — 92136 OPHTHALMIC BIOMETRY: CPT | Performed by: OPHTHALMOLOGY

## 2024-04-09 PROCEDURE — 1159F MED LIST DOCD IN RCRD: CPT | Performed by: OPHTHALMOLOGY

## 2024-04-09 RX ORDER — TETRACAINE HYDROCHLORIDE 5 MG/ML
1 SOLUTION OPHTHALMIC ONCE
Status: CANCELLED | OUTPATIENT
Start: 2024-04-09 | End: 2024-04-09

## 2024-04-09 RX ORDER — CYCLOPENTOLATE HYDROCHLORIDE 10 MG/ML
1 SOLUTION/ DROPS OPHTHALMIC
Status: CANCELLED | OUTPATIENT
Start: 2024-04-09 | End: 2024-04-09

## 2024-04-09 RX ORDER — PHENYLEPHRINE HYDROCHLORIDE 100 MG/ML
1 SOLUTION/ DROPS OPHTHALMIC
Status: CANCELLED | OUTPATIENT
Start: 2024-04-09 | End: 2024-04-09

## 2024-04-09 ASSESSMENT — ENCOUNTER SYMPTOMS
EYES NEGATIVE: 1
ENDOCRINE NEGATIVE: 0
HEMATOLOGIC/LYMPHATIC NEGATIVE: 0
PSYCHIATRIC NEGATIVE: 0
RESPIRATORY NEGATIVE: 0
MUSCULOSKELETAL NEGATIVE: 0
NEUROLOGICAL NEGATIVE: 0
GASTROINTESTINAL NEGATIVE: 0
CONSTITUTIONAL NEGATIVE: 0
ALLERGIC/IMMUNOLOGIC NEGATIVE: 0
CARDIOVASCULAR NEGATIVE: 0

## 2024-04-09 ASSESSMENT — REFRACTION_WEARINGRX
OD_CYLINDER: -0.75
OS_ADD: +2.50
OD_ADD: +2.50
OD_SPHERE: +1.00
OS_SPHERE: +2.50
OS_AXIS: 100
OS_CYLINDER: -1.50
OD_AXIS: 040

## 2024-04-09 ASSESSMENT — REFRACTION_MANIFEST
OS_AXIS: 100
OS_CYLINDER: -1.50
OD_SPHERE: +1.00
OS_SPHERE: +2.50
OS_ADD: +2.50
OD_AXIS: 040
OD_ADD: +2.50
OD_CYLINDER: -0.75

## 2024-04-09 ASSESSMENT — VISUAL ACUITY
OS_CC: 20/40
METHOD: SNELLEN - LINEAR
OD_BAT_MED: 20/60
CORRECTION_TYPE: GLASSES
OS_BAT_MED: 20/60
OD_CC: 20/40

## 2024-04-09 ASSESSMENT — TONOMETRY: IOP_METHOD: GOLDMANN APPLANATION

## 2024-04-11 RX ORDER — OFLOXACIN 3 MG/ML
SOLUTION/ DROPS OPHTHALMIC
Qty: 5 ML | Refills: 2 | Status: SHIPPED | OUTPATIENT
Start: 2024-04-11

## 2024-04-11 RX ORDER — PREDNISOLONE ACETATE 10 MG/ML
SUSPENSION/ DROPS OPHTHALMIC
Qty: 5 ML | Refills: 2 | Status: SHIPPED | OUTPATIENT
Start: 2024-04-11

## 2024-04-11 RX ORDER — KETOROLAC TROMETHAMINE 5 MG/ML
SOLUTION OPHTHALMIC
Qty: 5 ML | Refills: 2 | Status: SHIPPED | OUTPATIENT
Start: 2024-04-11

## 2024-04-22 ENCOUNTER — TELEPHONE (OUTPATIENT)
Dept: OPHTHALMOLOGY | Facility: CLINIC | Age: 74
End: 2024-04-22
Payer: MEDICARE

## 2024-04-22 DIAGNOSIS — H00.13 CHALAZION OF RIGHT EYE, UNSPECIFIED EYELID: Primary | ICD-10-CM

## 2024-04-22 RX ORDER — NEOMYCIN SULFATE, POLYMYXIN B SULFATE, AND DEXAMETHASONE 3.5; 10000; 1 MG/G; [USP'U]/G; MG/G
OINTMENT OPHTHALMIC
Qty: 3.5 G | Refills: 0 | Status: SHIPPED | OUTPATIENT
Start: 2024-04-22

## 2024-04-22 NOTE — TELEPHONE ENCOUNTER
Patient called stated had a stye of right eyelid (scheduled for CEIOL OS on 4/25). Okay to proceed with surgery OS. Will recommend frequent warm compresses and use maxitrol ophthalmic ointment TID x 7-10 days.

## 2024-04-22 NOTE — TELEPHONE ENCOUNTER
Pt called, sts having a stye OD x 1 week, has been using hot compresses. Pt is worried if she can still have Cataract Surgery OS on 4/25/24 with Dr. Banda. Per Dr. Banda, called pt back, told her it is ok for cataract surgery OS, to continue doing the hot compresses and Dr. Banda will be sending into her pharmacy an ointment for her to use 3 times a day OD x 1 week. Pt expressed verbal understanding of all information given.

## 2024-04-23 ENCOUNTER — ANESTHESIA EVENT (OUTPATIENT)
Dept: OPERATING ROOM | Facility: CLINIC | Age: 74
End: 2024-04-23
Payer: MEDICARE

## 2024-04-23 RX ORDER — FENTANYL CITRATE 50 UG/ML
25 INJECTION, SOLUTION INTRAMUSCULAR; INTRAVENOUS EVERY 5 MIN PRN
Status: CANCELLED | OUTPATIENT
Start: 2024-04-23

## 2024-04-23 RX ORDER — FENTANYL CITRATE 50 UG/ML
50 INJECTION, SOLUTION INTRAMUSCULAR; INTRAVENOUS EVERY 5 MIN PRN
Status: CANCELLED | OUTPATIENT
Start: 2024-04-23

## 2024-04-25 ENCOUNTER — HOSPITAL ENCOUNTER (OUTPATIENT)
Facility: CLINIC | Age: 74
Setting detail: OUTPATIENT SURGERY
Discharge: HOME | End: 2024-04-25
Attending: OPHTHALMOLOGY | Admitting: OPHTHALMOLOGY
Payer: MEDICARE

## 2024-04-25 ENCOUNTER — ANESTHESIA (OUTPATIENT)
Dept: OPERATING ROOM | Facility: CLINIC | Age: 74
End: 2024-04-25
Payer: MEDICARE

## 2024-04-25 VITALS
WEIGHT: 142.86 LBS | HEIGHT: 62 IN | OXYGEN SATURATION: 97 % | SYSTOLIC BLOOD PRESSURE: 161 MMHG | BODY MASS INDEX: 26.29 KG/M2 | DIASTOLIC BLOOD PRESSURE: 90 MMHG | RESPIRATION RATE: 18 BRPM | TEMPERATURE: 97.5 F | HEART RATE: 77 BPM

## 2024-04-25 DIAGNOSIS — H25.811 COMBINED FORM OF AGE-RELATED CATARACT, RIGHT EYE: Primary | ICD-10-CM

## 2024-04-25 DIAGNOSIS — H25.812 COMBINED FORM OF AGE-RELATED CATARACT, LEFT EYE: ICD-10-CM

## 2024-04-25 PROCEDURE — 3600000008 HC OR TIME - EACH INCREMENTAL 1 MINUTE - PROCEDURE LEVEL THREE: Performed by: OPHTHALMOLOGY

## 2024-04-25 PROCEDURE — 2500000004 HC RX 250 GENERAL PHARMACY W/ HCPCS (ALT 636 FOR OP/ED): Performed by: STUDENT IN AN ORGANIZED HEALTH CARE EDUCATION/TRAINING PROGRAM

## 2024-04-25 PROCEDURE — 2500000004 HC RX 250 GENERAL PHARMACY W/ HCPCS (ALT 636 FOR OP/ED): Performed by: NURSE ANESTHETIST, CERTIFIED REGISTERED

## 2024-04-25 PROCEDURE — 3700000002 HC GENERAL ANESTHESIA TIME - EACH INCREMENTAL 1 MINUTE: Performed by: OPHTHALMOLOGY

## 2024-04-25 PROCEDURE — 2760000001 HC OR 276 NO HCPCS: Performed by: OPHTHALMOLOGY

## 2024-04-25 PROCEDURE — 2500000001 HC RX 250 WO HCPCS SELF ADMINISTERED DRUGS (ALT 637 FOR MEDICARE OP): Performed by: OPHTHALMOLOGY

## 2024-04-25 PROCEDURE — 7100000009 HC PHASE TWO TIME - INITIAL BASE CHARGE: Performed by: OPHTHALMOLOGY

## 2024-04-25 PROCEDURE — 7100000010 HC PHASE TWO TIME - EACH INCREMENTAL 1 MINUTE: Performed by: OPHTHALMOLOGY

## 2024-04-25 PROCEDURE — A66984 PR REMV CATARACT EXTRACAP,INSERT LENS: Performed by: ANESTHESIOLOGY

## 2024-04-25 PROCEDURE — 2500000005 HC RX 250 GENERAL PHARMACY W/O HCPCS: Performed by: OPHTHALMOLOGY

## 2024-04-25 PROCEDURE — A4217 STERILE WATER/SALINE, 500 ML: HCPCS | Performed by: OPHTHALMOLOGY

## 2024-04-25 PROCEDURE — 99100 ANES PT EXTEME AGE<1 YR&>70: CPT | Performed by: ANESTHESIOLOGY

## 2024-04-25 PROCEDURE — 3600000003 HC OR TIME - INITIAL BASE CHARGE - PROCEDURE LEVEL THREE: Performed by: OPHTHALMOLOGY

## 2024-04-25 PROCEDURE — 2500000004 HC RX 250 GENERAL PHARMACY W/ HCPCS (ALT 636 FOR OP/ED): Performed by: OPHTHALMOLOGY

## 2024-04-25 PROCEDURE — 3700000001 HC GENERAL ANESTHESIA TIME - INITIAL BASE CHARGE: Performed by: OPHTHALMOLOGY

## 2024-04-25 PROCEDURE — A66984 PR REMV CATARACT EXTRACAP,INSERT LENS: Performed by: NURSE ANESTHETIST, CERTIFIED REGISTERED

## 2024-04-25 PROCEDURE — 66984 XCAPSL CTRC RMVL W/O ECP: CPT | Performed by: OPHTHALMOLOGY

## 2024-04-25 DEVICE — IMPLANTABLE DEVICE: Type: IMPLANTABLE DEVICE | Site: EYE | Status: FUNCTIONAL

## 2024-04-25 RX ORDER — ONDANSETRON HYDROCHLORIDE 2 MG/ML
4 INJECTION, SOLUTION INTRAVENOUS ONCE AS NEEDED
Status: DISCONTINUED | OUTPATIENT
Start: 2024-04-25 | End: 2024-04-25 | Stop reason: HOSPADM

## 2024-04-25 RX ORDER — WATER 1 ML/ML
IRRIGANT IRRIGATION AS NEEDED
Status: DISCONTINUED | OUTPATIENT
Start: 2024-04-25 | End: 2024-04-25 | Stop reason: HOSPADM

## 2024-04-25 RX ORDER — ACETAMINOPHEN 325 MG/1
650 TABLET ORAL EVERY 4 HOURS PRN
Status: DISCONTINUED | OUTPATIENT
Start: 2024-04-25 | End: 2024-04-25 | Stop reason: HOSPADM

## 2024-04-25 RX ORDER — MIDAZOLAM HYDROCHLORIDE 1 MG/ML
INJECTION, SOLUTION INTRAMUSCULAR; INTRAVENOUS AS NEEDED
Status: DISCONTINUED | OUTPATIENT
Start: 2024-04-25 | End: 2024-04-25

## 2024-04-25 RX ORDER — TETRACAINE HYDROCHLORIDE 5 MG/ML
1 SOLUTION OPHTHALMIC ONCE
Status: COMPLETED | OUTPATIENT
Start: 2024-04-25 | End: 2024-04-25

## 2024-04-25 RX ORDER — LABETALOL HYDROCHLORIDE 5 MG/ML
5 INJECTION, SOLUTION INTRAVENOUS ONCE AS NEEDED
Status: DISCONTINUED | OUTPATIENT
Start: 2024-04-25 | End: 2024-04-25 | Stop reason: HOSPADM

## 2024-04-25 RX ORDER — METOCLOPRAMIDE HYDROCHLORIDE 5 MG/ML
10 INJECTION INTRAMUSCULAR; INTRAVENOUS ONCE AS NEEDED
Status: DISCONTINUED | OUTPATIENT
Start: 2024-04-25 | End: 2024-04-25 | Stop reason: HOSPADM

## 2024-04-25 RX ORDER — ALBUTEROL SULFATE 0.83 MG/ML
2.5 SOLUTION RESPIRATORY (INHALATION) ONCE AS NEEDED
Status: DISCONTINUED | OUTPATIENT
Start: 2024-04-25 | End: 2024-04-25 | Stop reason: HOSPADM

## 2024-04-25 RX ORDER — PHENYLEPHRINE HYDROCHLORIDE 100 MG/ML
1 SOLUTION/ DROPS OPHTHALMIC
Status: COMPLETED | OUTPATIENT
Start: 2024-04-25 | End: 2024-04-25

## 2024-04-25 RX ORDER — POVIDONE-IODINE 5 %
SOLUTION, NON-ORAL OPHTHALMIC (EYE) AS NEEDED
Status: DISCONTINUED | OUTPATIENT
Start: 2024-04-25 | End: 2024-04-25 | Stop reason: HOSPADM

## 2024-04-25 RX ORDER — FENTANYL CITRATE 50 UG/ML
INJECTION, SOLUTION INTRAMUSCULAR; INTRAVENOUS AS NEEDED
Status: DISCONTINUED | OUTPATIENT
Start: 2024-04-25 | End: 2024-04-25

## 2024-04-25 RX ORDER — LIDOCAINE IN NACL,ISO-OSMOT/PF 30 MG/3 ML
0.1 SYRINGE (ML) INJECTION ONCE
Status: DISCONTINUED | OUTPATIENT
Start: 2024-04-25 | End: 2024-04-25 | Stop reason: HOSPADM

## 2024-04-25 RX ORDER — TETRACAINE HYDROCHLORIDE 5 MG/ML
SOLUTION OPHTHALMIC AS NEEDED
Status: DISCONTINUED | OUTPATIENT
Start: 2024-04-25 | End: 2024-04-25 | Stop reason: HOSPADM

## 2024-04-25 RX ORDER — LIDOCAINE HYDROCHLORIDE 10 MG/ML
INJECTION, SOLUTION EPIDURAL; INFILTRATION; INTRACAUDAL; PERINEURAL AS NEEDED
Status: DISCONTINUED | OUTPATIENT
Start: 2024-04-25 | End: 2024-04-25 | Stop reason: HOSPADM

## 2024-04-25 RX ORDER — CYCLOPENTOLATE HYDROCHLORIDE 10 MG/ML
1 SOLUTION/ DROPS OPHTHALMIC
Status: COMPLETED | OUTPATIENT
Start: 2024-04-25 | End: 2024-04-25

## 2024-04-25 RX ORDER — SODIUM CHLORIDE 0.9 % (FLUSH) 0.9 %
SYRINGE (ML) INJECTION AS NEEDED
Status: DISCONTINUED | OUTPATIENT
Start: 2024-04-25 | End: 2024-04-25

## 2024-04-25 RX ADMIN — Medication 3 ML: at 09:01

## 2024-04-25 RX ADMIN — CYCLOPENTOLATE HYDROCHLORIDE 1 DROP: 10 SOLUTION/ DROPS OPHTHALMIC at 08:10

## 2024-04-25 RX ADMIN — MANNITOL 12.5 G: 20 INJECTION, SOLUTION INTRAVENOUS at 08:04

## 2024-04-25 RX ADMIN — FENTANYL CITRATE 50 MCG: 50 INJECTION INTRAMUSCULAR; INTRAVENOUS at 09:15

## 2024-04-25 RX ADMIN — Medication 3 ML: at 08:54

## 2024-04-25 RX ADMIN — MIDAZOLAM 1 MG: 1 INJECTION INTRAMUSCULAR; INTRAVENOUS at 09:01

## 2024-04-25 RX ADMIN — CYCLOPENTOLATE HYDROCHLORIDE 1 DROP: 10 SOLUTION/ DROPS OPHTHALMIC at 08:00

## 2024-04-25 RX ADMIN — PHENYLEPHRINE HYDROCHLORIDE 1 DROP: 100 SOLUTION/ DROPS OPHTHALMIC at 08:05

## 2024-04-25 RX ADMIN — Medication 4 ML: at 09:15

## 2024-04-25 RX ADMIN — PHENYLEPHRINE HYDROCHLORIDE 1 DROP: 100 SOLUTION/ DROPS OPHTHALMIC at 08:10

## 2024-04-25 RX ADMIN — PHENYLEPHRINE HYDROCHLORIDE 1 DROP: 100 SOLUTION/ DROPS OPHTHALMIC at 08:00

## 2024-04-25 RX ADMIN — CYCLOPENTOLATE HYDROCHLORIDE 1 DROP: 10 SOLUTION/ DROPS OPHTHALMIC at 08:05

## 2024-04-25 RX ADMIN — TETRACAINE HYDROCHLORIDE 1 DROP: 5 SOLUTION OPHTHALMIC at 08:00

## 2024-04-25 RX ADMIN — MIDAZOLAM 1 MG: 1 INJECTION INTRAMUSCULAR; INTRAVENOUS at 08:54

## 2024-04-25 SDOH — HEALTH STABILITY: MENTAL HEALTH: CURRENT SMOKER: 0

## 2024-04-25 ASSESSMENT — COLUMBIA-SUICIDE SEVERITY RATING SCALE - C-SSRS
6. HAVE YOU EVER DONE ANYTHING, STARTED TO DO ANYTHING, OR PREPARED TO DO ANYTHING TO END YOUR LIFE?: NO
1. IN THE PAST MONTH, HAVE YOU WISHED YOU WERE DEAD OR WISHED YOU COULD GO TO SLEEP AND NOT WAKE UP?: NO
2. HAVE YOU ACTUALLY HAD ANY THOUGHTS OF KILLING YOURSELF?: NO

## 2024-04-25 ASSESSMENT — PAIN SCALES - GENERAL
PAINLEVEL_OUTOF10: 0 - NO PAIN

## 2024-04-25 ASSESSMENT — PAIN - FUNCTIONAL ASSESSMENT
PAIN_FUNCTIONAL_ASSESSMENT: 0-10

## 2024-04-25 NOTE — OP NOTE
Phacoemulsification Cataract with Insertion Intraocular Lens (L) Operative Note     Date: 2024  OR Location: [facility] [place] [facility] OR    Name: Annabel Cottrell, : 1950, Age: 74 y.o., MRN: [id number], Sex: female    Diagnosis  Pre-op Diagnosis     * Combined form of age-related cataract, left eye [H25.812] Post-op Diagnosis     * Combined form of age-related cataract, left eye [H25.812]     Procedures  Phacoemulsification Cataract with Insertion Intraocular Lens  40120 - FL XCAPSL CTRC RMVL INSJ IO LENS PROSTH W/O ECP      Surgeons      * Joselin Heath - Primary    Resident/Fellow/Other Assistant:  Surgeons and Role:  * No surgeons found with a matching role *    Procedure Summary  Anesthesia: Monitor Anesthesia Care  ASA: III  Anesthesia Staff: Anesthesiologist: Jessica Ryan MD  CRNA: EVELYN Stevens  Estimated Blood Loss: 0 mL  Intra-op Medications:   Administrations occurring from 0850 to 0940 on 24:   Medication Name Total Dose   lidocaine PF (Xylocaine) 10 mg/mL (1 %) injection 2 mL   balanced salts (BSS) intraocular solution 15 mL   sterile water irrigation solution 250 mL   povidone-iodine 5 % ophthalmic solution 1 Application   tetracaine (PF) 0.5 % ophthalmic solution 2 drop   balanced salts (BSS) intraocular solution 1,000 mL   chondroitin sulf-sod hyaluron (Duovisc) intraocular kit 0.5 mL              Anesthesia Record               Intraprocedure I/O Totals       None           Specimen: No specimens collected     Staff:   Circulator: Jania Silveira RN  Scrub Person: Karyn Fajardo RN         Drains and/or Catheters: * None in log *    Tourniquet Times:         Implants:  Implants       Type Name Action Serial No.      Lens DIBOO Implanted [id number]              Findings: as below    Indications: Annabel Cottrell is an 74 y.o. female who is having surgery for Combined form of age-related cataract, left eye [H25.812].     Procedure Details: Patient name: Annabel Cottrell    YOB: 1950   MRN: [id number]   Date of surgery: April 25, 2024  Preoperative diagnosis: Combined cataract of the LEFT eye  Postoperative diagnosis: Combined cataract of the LEFT eye  Procedure: Phacoemulsification of cataract with insertion of intraocular lens, LEFT eye   Surgeon: Joselin Heath MD  Resident: Christine Ku MD  Anesthesia: MAC  Complications: None  Lens Inserted: S[employer] Slim DIB00 with a power of +27.00 D. Serial #: [id number]. Exp date: 09/11/2026.    Procedure description: After the risks, benefits, and alternatives of the planned procedure were discussed with the patient, informed consent was obtained at the preoperative evaluation. On the day of surgery, there were no updates to the consent form and any patient questions were answered. The patient was correctly identified in the preoperative area and the LEFT eye was marked as the operative eye. Dilating drops were instilled into the LEFT eye in the preoperative area. IV mannitol was administered in the preoperative area. The patient was then taken back to the operating room and placed under sedation. The patient was prepped and draped in the standard, sterile ophthalmic fashion in preparation for intraocular surgery.    A lid speculum was placed into the LEFT palpebral fissure and the operating microscope was brought into position. A paracentesis was made in inferotemporal cornea at the limbus with a 1.0mm side port blade using a cotton tipped applicator to provide countertraction. Intracameral epishugarcaine was injected into the anterior chamber followed by Viscoat viscoelastic. Using 0.12 forceps to stabilize the globe, a 2.4mm keratome blade was used to create a limbal clear-corneal incision superotemporally. A bent-needle cystotome and Utrata forceps were used to create a continuous curvilinear capsulorrhexis. Balanced salt saline solution on a blunt-tipped cannula was used to achieve hydrodissection.    A  phacoemulsification device and a [place] spatula were used to remove the nucleus using a divide-and-conquer technique. Residual cortical material was removed with the irrigation and aspiration handpiece. Provisc viscoelastic was then injected into the eye to reform the anterior chamber and to open the capsular bag. The posterior capsule was inspected and found to be clean and intact. The intraocular lens, Slim & Slim DIB00 with a power of +27.00 diopters was injected into the capsular bag. A lens positioner was used to center the lens and ensure good position within the bag. The remaining viscoelastic was removed using irrigation and aspiration. Balanced salt saline solution on a blunt-tipped cannula was then used to hydrate the corneal stroma adjacent to the main wound and paracentesis site as well as to reform the anterior chamber. The temporal main incision was then closed with a single 10-0 vicryl suture in an interrupted fashion. The wound was checked and found to be watertight with normal intraocular pressure verified using digital palpation. At the conclusion of the case, a well-centered intraocular lens with a good red reflex was observed. Tetracaine, betadine, BSS, and prednisolone acetate drops were instilled into the LEFT eye. The lid speculum and drapes were removed. A clear plastic shield was then taped over the eye. The patient was taken to the recovery room in stable condition, having tolerated the procedure well. There were no complications.      Complications:  None; patient tolerated the procedure well.    Disposition: PACU - hemodynamically stable.  Condition: stable         Additional Details: none    Attending Attestation: I performed the procedure.    Joselin Heath  Phone Number: [phone]

## 2024-04-25 NOTE — DISCHARGE INSTRUCTIONS
Surgeon: Joselin Heath MD     Patient name: Annabel Cottrell    Date of surgery: April 25, 2024        DROP INSTRUCTIONS:    Prednisolone acetate 1% (pink or white cap) - One drop 4 times a day to operative eye    Ofloxacin (tan cap) - One drop 4 times a day to operative eye    Ketorolac - One drop 4 times a day to operative eye    When putting different drops in around the same administration time, please wait 1-2 minutes between drops  Avoid sleeping on side of operative eye  No heavy lifting over 10-15lbs and no bending over  Do not get eye wet, no eye rubbing  Wear sunglasses or glasses during the day and the shield when sleeping at night  Please call immediately if you develop any redness, pain, decreased vision, flashes, or floaters      During office hours (8:30a-4:30p): [phone]  After office hours: [phone] ([phone]  [facility] : [phone]   Pager: [phone] for Dr. Banda

## 2024-04-25 NOTE — ANESTHESIA POSTPROCEDURE EVALUATION
Patient: Annabel Cottrell    Procedure Summary       Date: 04/25/24 Room / [place] [facility] [place] [facility] / [facility] [place] [facility]    Anesthesia Start: 0850 Anesthesia Stop: 0937    Procedure: Phacoemulsification Cataract with Insertion Intraocular Lens (Left: Eye) Diagnosis:       Combined form of age-related cataract, left eye      (Combined form of age-related cataract, left eye [H25.812])    Surgeons: Joselin Heath MD Responsible Provider: Jessica Ryan MD    Anesthesia Type: MAC ASA Status: 3            Anesthesia Type: MAC    Vitals Value Taken Time   /89 04/25/24 0946   Temp 36.4 °C (97.5 °F) 04/25/24 0931   Pulse 79 04/25/24 0946   Resp 18 04/25/24 0946   SpO2 97 % 04/25/24 0946       Anesthesia Post Evaluation    Patient participation: complete - patient participated  Level of consciousness: awake and alert  Pain management: adequate  Airway patency: patent  Cardiovascular status: acceptable  Respiratory status: acceptable  Hydration status: acceptable  Postoperative Nausea and Vomiting: none    No notable events documented.

## 2024-04-25 NOTE — ANESTHESIA PREPROCEDURE EVALUATION
Patient: Annabel Cottrell    Procedure Information       Anesthesia Start Date/Time: 04/25/24 0850    Procedure: Phacoemulsification Cataract with Insertion Intraocular Lens (Left: Eye)    Location: [facility] [place] [facility] OR 03 / [facility] [place] [facility]    Surgeons: Joselin Heath MD          Vitals:    04/25/24 0759   BP: 157/84   Pulse: 90   Resp: 16   Temp: 37.1 °C (98.8 °F)   SpO2: 96%       Past Surgical History:   Procedure Laterality Date   • HYSTERECTOMY  08/03/2017    Hysterectomy   • OTHER SURGICAL HISTORY  06/25/2020    Mastectomy partial   • OTHER SURGICAL HISTORY  06/25/2020    [place] lymph node biopsy procedure   • OTHER SURGICAL HISTORY  07/06/2020    Axillary lymphadenectomy   • THYROID SURGERY  01/15/2014    Thyroid Surgery     Past Medical History:   Diagnosis Date   • Anxiety 04/04/2023   • Arthritis    • Cataract    • CKD (chronic kidney disease)     stage 3a   • Conjunctival hemorrhage, unspecified eye 11/14/2017    Subconjunctival hemorrhage   • Depression    • Endometrial cancer (Multi) 04/04/2023   • Endometrial hyperplasia, unspecified     Endometrial hyperplasia   • GERD (gastroesophageal reflux disease)    • Hyperlipidemia    • Hypertension    • Hypothyroidism    • Intraductal papilloma of right breast 04/04/2023   • 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    Malignant neoplasm of uterine fundus   • Other abnormal and inconclusive findings on diagnostic imaging of breast 12/19/2019    Abnormal MRI, breast   • Personal history of malignant neoplasm of thyroid 12/19/2019    History of malignant neoplasm of thyroid   • Personal history of other malignant neoplasm of skin     History of other malignant neoplasm of skin   • Personal history of other medical treatment 09/08/2017    History of screening mammography   • Personal history of other specified conditions 12/06/2019    History of abnormal mammogram   • Pneumonitis due to inhalation of food and vomit (Multi) 10/06/2016    Aspiration  pneumonia of both upper lobes due to gastric secretions   • Vaginal atrophy 04/04/2023     No current facility-administered medications for this encounter.  Prior to Admission medications    Medication Sig Start Date End Date Taking? Authorizing Provider   ascorbic acid, vitamin C, 500 mg capsule Take 1 capsule by mouth in the morning and 1 capsule before bedtime. 2/3/21  Yes Historical Provider, MD   calcium carbonate 600 mg calcium (1,500 mg) tablet Take 2 tablets (3,000 mg) by mouth once daily. 7/3/19  Yes Historical Provider, MD   cholecalciferol (Vitamin D-3) 125 MCG (5000 UT) capsule Take 1 capsule (125 mcg) by mouth once daily.   Yes Historical Provider, MD   ketorolac (Acular) 0.5 % ophthalmic solution 1 drop to surgical eye 4 times a day starting 1 day before surgery 4/11/24  Yes Joselin Heath MD   levothyroxine (Synthroid, Levoxyl) 112 mcg tablet Take 1 tablet (112 mcg) by mouth once daily. as directed 6/30/23 6/29/24 Yes Kenzie Veliz MD   magnesium gluconate (Magonate) 27.5 mg magne- sium (500 mg) tablet Take 1 tablet (27.5 mg) by mouth once daily. 2/3/21  Yes Historical Provider, MD   ofloxacin (Ocuflox) 0.3 % ophthalmic solution 1 drop to operative eye 4 times a day starting 1 day before surgery 4/11/24  Yes Joselin Heath MD   omeprazole (PriLOSEC) 20 mg DR capsule Take 1 capsule (20 mg) by mouth once daily. 12/4/23  Yes Kenzie Veliz MD   rosuvastatin (Crestor) 40 mg tablet Take 1 tablet (40 mg) by mouth once daily at bedtime. 10/14/19  Yes Historical Provider, MD   tamoxifen (Nolvadex) 20 mg tablet Take 1 tablet (20 mg total) by mouth once daily. 10/30/23 10/29/24 Yes Carmen Dorsey, APRN-CNP   valsartan (Diovan) 160 mg tablet Take 1 tablet (160 mg) by mouth once daily. 3/27/24  Yes Kenzie Veliz MD   venlafaxine XR (Effexor-XR) 75 mg 24 hr capsule Take 1 capsule (75 mg) by mouth once daily. 3/8/24  Yes Kenzie Veliz MD   zinc gluconate 50 mg  tablet Take 1 tablet (50 mg) by mouth once daily.   Yes Historical Provider, MD   estrogens, conjugated, (Premarin) vaginal cream Apply pea-sized amount to the vaginal opening 3 times weekly as directed 5/8/23   Kenzie Veliz MD   neomycin-polymyxin B-dexameth (Polydex) 3.5 mg/g-10,000 unit/g-0.1 % ointment ophthalmic ointment 1/2 inch to right eye/eyelid 3 times a day for 7-10 days 4/22/24   Joselin Heath MD   prednisoLONE acetate (Pred-Forte) 1 % ophthalmic suspension 1 drop to operative eye 4 times a day starting the day of surgery (after surgery is done) 4/11/24   Joselin Heath MD     Allergies   Allergen Reactions   • Aloe Vera Latex Gloves [Gloves, Latex With Aloe Vera] Unknown     Pt is only allergic to LATEX   • Codeine Unknown     Social History     Tobacco Use   • Smoking status: Never   • Smokeless tobacco: Never   • Tobacco comments:     No history of anesthesia reactions. No family history of MH.     No illnesses in the past 30 days.     Does get mildly SOB flight of stairs, so she goes slowly.     Does not use a cane, walker, or wheelchair.     Is able to lie flat for 30 minutes.     Substance Use Topics   • Alcohol use: Yes     Comment: rare         Chemistry    Lab Results   Component Value Date/Time     01/29/2024 1726    K 4.2 01/29/2024 1726     01/29/2024 1726    CO2 27 01/29/2024 1726    BUN 18 01/29/2024 1726    CREATININE 1.38 (H) 01/29/2024 1726    Lab Results   Component Value Date/Time    CALCIUM 9.2 01/29/2024 1726    ALKPHOS 29 (L) 10/30/2023 0942    AST 16 10/30/2023 0942    ALT 10 10/30/2023 0942    BILITOT 0.4 10/30/2023 0942          Lab Results   Component Value Date/Time    WBC 6.3 10/30/2023 0942    HGB 13.0 10/30/2023 0942    HCT 38.4 10/30/2023 0942     10/30/2023 0942     Lab Results   Component Value Date/Time    PROTIME 11.6 09/03/2020 1304    INR 1.0 09/03/2020 1304     Encounter Date: 11/08/23   ECG 12 lead (Clinic Performed)    Result Value    Ventricular Rate 90    Atrial Rate 90    WA Interval 140    QRS Duration 86    QT Interval 364    QTC Calculation(Bazett) 445    P Axis 77    R Axis -11    T Axis 70    QRS Count 15    Q Onset 222    P Onset 152    P Offset 206    T Offset 404    QTC Fredericia 416    Narrative    Normal sinus rhythm  Normal ECG  When compared with ECG of 02-FEB-2022 10:51,  No significant change was found  Confirmed by Marcos Coulter ([id number]) on 12/3/2023 7:31:34 PM     No results found for this or any previous visit from the past 1095 days.        Relevant Problems   Cardiac   (+) Benign essential hypertension   (+) Coronary atherosclerosis   (+) Hypercholesterolemia      Neuro   (+) Depression   (+) Depression, major, in remission (CMS-HCC)   (+) Other specified anxiety disorders      GI   (+) GERD (gastroesophageal reflux disease)      Endocrine   (+) Class 1 obesity with body mass index (BMI) of 30.0 to 30.9 in adult   (+) Hypothyroidism      ID   (+) Herpes simplex       Clinical information reviewed:   Tobacco  Allergies  Meds   Med Hx  Surg Hx   Fam Hx  Soc Hx        NPO Detail:  NPO/Void Status  Date of Last Liquid: 04/25/24  Time of Last Liquid: 0605  Date of Last Solid: 04/25/24  Time of Last Solid: 2000  Last Intake Type: Clear fluids         Physical Exam    Airway  Mallampati: II  TM distance: >3 FB  Neck ROM: full     Cardiovascular   Rhythm: regular  Rate: normal     Dental - normal exam     Pulmonary   Breath sounds clear to auscultation     Abdominal        Anesthesia Plan    History of general anesthesia?: yes  History of complications of general anesthesia?: no    ASA 3     MAC   (Hx of breast CA, thyroid CA.)  The patient is not a current smoker.    intravenous induction   Anesthetic plan and risks discussed with patient.    Plan discussed with CRNA.

## 2024-04-25 NOTE — H&P
History Of Present Illness  Annabel Cottrell is a 74 y.o. female presenting with a history of visually-significant combined cataract in the left eye here for cataract extraction and intraocular lens insertion of the left eye.     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    Malignant neoplasm of uterine fundus    Other abnormal and inconclusive findings on diagnostic imaging of breast 12/19/2019    Abnormal MRI, breast    Personal history of malignant neoplasm of thyroid 12/19/2019    History of malignant neoplasm of thyroid    Personal history of other malignant neoplasm of skin     History of other malignant neoplasm of skin    Personal history of other medical treatment 09/08/2017    History of screening mammography    Personal history of other specified conditions 12/06/2019    History of abnormal mammogram    Pneumonitis due to inhalation of food and vomit (Multi) 10/06/2016    Aspiration pneumonia of both upper lobes due to gastric secretions    Vaginal atrophy 04/04/2023       Surgical History  Past Surgical History:   Procedure Laterality Date    HYSTERECTOMY  08/03/2017    Hysterectomy    OTHER SURGICAL HISTORY  06/25/2020    Mastectomy partial    OTHER SURGICAL HISTORY  06/25/2020    [place] lymph node biopsy procedure    OTHER SURGICAL HISTORY  07/06/2020    Axillary lymphadenectomy    THYROID SURGERY  01/15/2014    Thyroid Surgery        Social History  She reports that she has never  smoked. She has never used smokeless tobacco. She reports current alcohol use. She reports that she does not use drugs.    Family History  Family History   Problem Relation Name Age of Onset    Heart attack Mother      Heart attack Father      Other (cardiac arrhythmia) Sister          Allergies  Aloe vera latex gloves [gloves, latex with aloe vera] and Codeine    Review of Systems   All other systems reviewed and are negative.       Physical Exam     Last Recorded Vitals  There were no vitals taken for this visit.    Relevant Results             Assessment/Plan   Principal Problem:    Combined form of age-related cataract, left eye      Annabel Cottrell is a 74 y.o. female presenting with a history of visually-significant combined cataract in the left eye here for cataract extraction and intraocular lens insertion of the left eye.           Christine Ku MD

## 2024-04-26 ENCOUNTER — OFFICE VISIT (OUTPATIENT)
Dept: OPHTHALMOLOGY | Facility: CLINIC | Age: 74
End: 2024-04-26
Payer: MEDICARE

## 2024-04-26 ENCOUNTER — APPOINTMENT (OUTPATIENT)
Dept: RADIOLOGY | Facility: HOSPITAL | Age: 74
End: 2024-04-26
Payer: MEDICARE

## 2024-04-26 ENCOUNTER — APPOINTMENT (OUTPATIENT)
Dept: HEMATOLOGY/ONCOLOGY | Facility: HOSPITAL | Age: 74
End: 2024-04-26
Payer: MEDICARE

## 2024-04-26 DIAGNOSIS — H52.4 PRESBYOPIA: ICD-10-CM

## 2024-04-26 DIAGNOSIS — H25.811 COMBINED FORM OF AGE-RELATED CATARACT, RIGHT EYE: ICD-10-CM

## 2024-04-26 DIAGNOSIS — H00.11 CHALAZION OF RIGHT UPPER EYELID: ICD-10-CM

## 2024-04-26 DIAGNOSIS — H52.203 ASTIGMATISM OF BOTH EYES, UNSPECIFIED TYPE: ICD-10-CM

## 2024-04-26 DIAGNOSIS — Z96.1 PSEUDOPHAKIA: Primary | ICD-10-CM

## 2024-04-26 DIAGNOSIS — H52.03 HYPEROPIA, BILATERAL: ICD-10-CM

## 2024-04-26 DIAGNOSIS — H35.361 MACULAR DRUSEN, RIGHT: ICD-10-CM

## 2024-04-26 PROCEDURE — 99024 POSTOP FOLLOW-UP VISIT: CPT | Performed by: OPHTHALMOLOGY

## 2024-04-26 ASSESSMENT — ENCOUNTER SYMPTOMS
MUSCULOSKELETAL NEGATIVE: 0
HEMATOLOGIC/LYMPHATIC NEGATIVE: 0
EYES NEGATIVE: 1
GASTROINTESTINAL NEGATIVE: 0
ENDOCRINE NEGATIVE: 0
PSYCHIATRIC NEGATIVE: 0
NEUROLOGICAL NEGATIVE: 0
CARDIOVASCULAR NEGATIVE: 0
CONSTITUTIONAL NEGATIVE: 0
ALLERGIC/IMMUNOLOGIC NEGATIVE: 0
RESPIRATORY NEGATIVE: 0

## 2024-04-26 ASSESSMENT — VISUAL ACUITY
METHOD: SNELLEN - LINEAR
OS_SC: 20/50
OS_SC+: +1

## 2024-04-26 ASSESSMENT — PAIN SCALES - GENERAL: PAINLEVEL_OUTOF10: 0 - NO PAIN

## 2024-04-26 ASSESSMENT — TONOMETRY
OS_IOP_MMHG: 17
IOP_METHOD: GOLDMANN APPLANATION

## 2024-04-26 ASSESSMENT — EXTERNAL EXAM - LEFT EYE: OS_EXAM: NORMAL

## 2024-04-26 ASSESSMENT — EXTERNAL EXAM - RIGHT EYE: OD_EXAM: NORMAL

## 2024-04-26 NOTE — PROGRESS NOTES
Pseudophakia of left eyeZ96.1 - 4/25/24 - Preoperative mannitol  -Doing well. Good vision. IOP good.  Prednisolone acetate 1% - 4 times a day  Ofloxacin - 4 times a day  Ketorolac - 4 times a day  No heavy lifting over 10-15 lbs, no bending over, do not get eye wet, no eye rubbing. Wear eye shield at bedtime and sunglasses/glasses during the day. Advised to call if any redness, pain, decreased vision, flashes, floaters. F/u 1 week - refract OU (autorefract first), glare/BAT right eye. Plan for dilation OU if cataract right eye is visually significant and if patient would like to proceed with cataract surgery.     Chalazion - right upper eyelid  -History of chalazion RUL x 2 weeks, not painful. Started maxitrol ophthalmic ointment on 4/22/24 - advised to continue TID until next visit.   -May have small chalazion starting NATHANIEL, advised to use drops as above for now. May use heating pad gently on upper lid as needed.   -Recommended to use heating pad on RUL TID.   -F/u 1 week - consider I&C if not resolving.     Combined form of age-related cataract, right eye  -Tentatively scheduled for 5/30/24  -Will discuss option of cataract surgery right eye at next visit. F/u 1 week - refract OU (autorefract first), glare/BAT right eye. Plan for dilation OU if cataract right eye is visually significant and if patient would like to proceed with cataract surgery.     Macular drusen, right  -Not visually significant at this time. Monitor.   -OCT macula (3/5/24) - Normal thickness and contour OU. Intact EZ OU. No edema OU. Rare small   drusen peripheral macula OD. 266/259  -Not visually significant at this time. Monitor. Will consider referral to retina service in the future if any worsening of condition.     Cup to disc asymmetry  -No FH of glaucoma  -Minor cupping OS>OD  -OCT RNFL (3/5/24) - OD: WNL. OS: Thin ST, bord T. 97/84.   -Low suspicion for glaucoma at this time. Plan to recheck OCT RNFL in 1 year.     Hyperopia,  bilateral  Astigmatism of both eyes, unspecified type  Presbyopia  -Wears daily disposable contact lens (CL) - may continue.   -History of monovision OD for near and OS for distance: +7.50 OD and +4.50 OS  -Tried multifocal CL in the past and did not like.   -F/u 1 week - refract OU (autorefract first), glare/BAT right eye. Plan for dilation OU if cataract right eye is visually significant and if patient would like to proceed with cataract surgery.     No history of refractive surgery.   No FH of AMD/glaucoma

## 2024-04-26 NOTE — PATIENT INSTRUCTIONS
Surgeon: Joselin Heath MD      Patient name: Annabel Cottrell    Date of visit: April 26, 2024      left eye    Prednisolone acetate (pink or white cap) - 4 times a day    Ofloxacin (tan cap) - 4 times a day    Ketorolac (gray cap) - 4 times a day      No heavy lifting over 10-15 lbs, no bending over, do not get eye wet, no eye rubbing. Wear eye shield at bedtime and sunglasses/glasses during the day. Please call immediately if you develop any redness, pain, decreased vision, flashes, floaters.       Surgical Scheduler (during office hours): Atiya: [phone]  Office phone (after hours): [phone] : [phone]                     Pager: [phone] for Dr. Banda

## 2024-04-28 ASSESSMENT — CUP TO DISC RATIO
OD_RATIO: 0.4
OS_RATIO: 0.45

## 2024-04-28 ASSESSMENT — EXTERNAL EXAM - LEFT EYE: OS_EXAM: NORMAL

## 2024-04-28 ASSESSMENT — EXTERNAL EXAM - RIGHT EYE: OD_EXAM: NORMAL

## 2024-04-28 NOTE — PATIENT INSTRUCTIONS
Surgeon: Joselin Heath MD                   [phone]      Patient name: Annabel Cottrell    Date of visit: 4/30/24      left eye    Prednisolone acetate (pink or white cap) - 4 times a day for 1 week, 3 times a day for 1 week, 2 times a day for 1 week, once a day for 1 week, then stop    Ofloxacin (tan cap) - 4  times a day for 1 more week, then stop    Ketorolac (gray cap) - 4 times a day for 1 more week, then stop      RIGHT EYE    Neomycin-polymyxin-dexamethasone ophthalmic ointment - 1/2 inch to right eye 3 times a day x 7-10 days     Cool compress or ice pack intermittently today, use heating pad on eye at bedtime.     May take tylenol or ibuprofen for pain.     You may have bloody tears today, call if there is excessive bleeding that won't stop. Call if any redness, swelling or pain that worsens.         No heavy lifting over 15-20 lbs, try not to get eye wet, no eye rubbing. You may stop wearing the eye shield at night. Please continue wearing glasses or sunglasses during the day to protect your eyes. Please call immediately if you develop any redness, pain, decreased vision, flashes, floaters.     Surgical Scheduler (during office hours) - Atiya: [phone]

## 2024-04-28 NOTE — PROGRESS NOTES
Combined form of age-related cataract, right eye  -Visually significant cataract right eye. BCVA: 20/40. Glare: 20/60(M). Symptoms: Gradual worsening of vision over the past year. Difficulty recognizing faces. Had difficulty seeing faces on soccer field. Harder to read small print. More difficulty seeing small words/numbers on TV. Having more trouble seeing road signs when driving. Glare from bright lights. A change in glasses prescription will not result in significant visual improvement at this time.  Indication/anticipated outcome for cataract surgery: To potentially improve visual acuity and improve quality of life/reduce symptoms. To obtain a better view of the retina/optic nerve. To reduce anisometropia).  Based on a comprehensive eye exam performed April 30, 2024, a visually significant cataract appears to be the source of decreased vision, diminished quality of life, and impairment of activities of daily living. Discussed option of cataract surgery vs observation. Patient can no longer function adequately with current best corrected visual acuity and wishes to have cataract surgery at this time. Discussed surgical procedure with patient. Discussed potential risks, benefits, and complications of cataract surgery including but not limited to pain, bleeding, infection, inflammation, edema, increased eye pressure, retinal tear/detachment, lens dislocation, ptosis, iris damage, need for additional surgery, need for glasses after surgery, loss of vision/loss of eye. Patient understands and wishes to proceed. All questions were answered. Will schedule cataract surgery right eye. Lenstar done April 9, 2024.   -Pentacam (4/9/24) - OD: Irregular. 45./45.7 @ 44.4. OS: Irregular. 45.3/45.6 @ 172.7.   Discussed IOL options (standard monofocal, toric, multifocal). Lens chosen: Standard monofocal. Defer/decline toric/multifocal lens at this time.  Aim: -2.00 to -2.50 OD. Had thorough discussion with patient re: aim.  Discussed that may potentially need glasses for best vision both at distance and at near. Patient with history of monovision for years with CLs - OS for distance and OD for near - she would like to maintain this with cataract surgery.   Special considerations: May use epishugarcaine and possible Malyugin Ring/iris hooks if poor dilation on day of surgery. Due to shorter axial length and shallow anterior chamber, will plan for mannitol preoperatively. No R.   Best contact number: [phone]  Drops:   -Ketorolac (or Diclofenac) and Ofloxacin 4x/day starting 1 day prior to surgery; Prednisolone acetate 1% 4x/day starting after surgery    Pseudophakia of left eyeZ96.1 - 4/25/24 - Preoperative mannitol  -Doing well. Good vision. IOP good. Temporal suture removed - likely inducing astigmatism.   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 Recommend observation and give time for neuroadaptation.   -No heavy lifting over 15-20 lbs, do not get eye wet, no eye rubbing. Discontinue eye shield at bedtime but wear sunglasses/glasses during the day. Advised to call if any redness, pain, decreased vision, flashes, floaters.     Chalazion - right upper eyelid  -History of chalazion RUL x 2-3 weeks, not painful. Started maxitrol ophthalmic ointment on 4/22/24.   -Small early chalazion NATHANIEL - resolved now. May still use heating pad on upper lid as needed.   -Discussed RUL chalazion - given upcoming cataract surgery, would be ideal to try to drain and resolve prior to surgery. Symptoms: Swelling, improving, but not resolved with medical therapy alone.  Discussed diagnosis and etiology with patient - option of observing vs I&D. Patient is bothered enough by appearance and symptoms - will proceed with I&D. Procedure  discussed. R/B/A of procedure includes but not limited to: pain, bleeding, inflammation, infection, edema, bruising, ptosis, incomplete drainage, need to repeat procedure, scar, loss of vision, loss of eye. Patient understands and wishes to proceed. All questions were answered and consent form was signed.     Procedure note: Proparacaine gtts administered OD. A drop of betadine was instilled into the right eye. Betadine swab used to clean Right upper eyelid and lashes. 1% lidocaine with epinephrine injected into the right upper eyelid in the area of the chalazion both anteriorly and posteriorly. A chalazion lid clamp was placed and the eyelid was everted. A #11 blade was used to create a cruciate incision in the conjunctiva/tarsal plate. A curette and cotton tip applicators were used to attempt to drain the lesion, however, no inflammatory material presented. The chalazion clamp was flipped and attention was turned to the anterior portion of the chalazion - the #11 blade was used to incise a cystic component. A cotton tip and curette were used to attempt to drain the lesion, no inflammatory material presented. Hemostasis was achieved. The lid clamp was removed. Sterile saline was irrigated into the eye to rinse the excess blood and betadine. Erythromycin ophthalmic ointment was placed under the lid. A patch was taped over the eyelids.    Patient was instructed to remove patch in a few hours. May use ice packs/Tylenol today for swelling/pain management. But should resume warm compresses this evening. Use neomycin-polymyxin-dexamethasone ophthalmic ointment OD TID x 7-10 days. Advised to call sooner if any worsening of symptoms.   **Addendum - per patient message, with FBS of surgical eye, advised to use artificial tears. Regarding chalazia, original is not completely resolved and new ones are forming. Can continue neopolydex ointment BID x 1 more week, and will try short course of doxycycine PO - 50mg BID x 1 week,  then qday until next appointment**    Macular drusen, right  -Not visually significant at this time. Monitor.   -OCT macula (3/5/24) - Normal thickness and contour OU. Intact EZ OU. No edema OU. Rare small   drusen peripheral macula OD. 266/259  -Not visually significant at this time. Monitor. Will consider referral to retina service in the future if any worsening of condition.     Cup to disc asymmetry  -No FH of glaucoma  -Minor cupping OS>OD  -OCT RNFL (3/5/24) - OD: WNL. OS: Thin ST, bord T. 97/84.   -Low suspicion for glaucoma at this time. Plan to recheck OCT RNFL in 1 year.     Hyperopia, bilateral  Astigmatism of both eyes, unspecified type  Presbyopia  -Wears daily disposable contact lens (CL) - would stop contact lens wear for now and would wear OTC reading glasses +2.50.   -History of monovision OD for near and OS for distance: +7.50 OD and +4.50 OS  -Tried multifocal CL in the past and did not like.   -Defer new Rx. No significant improvement in vision with refraction at this time.       No history of refractive surgery.   No FH of AMD/glaucoma

## 2024-04-30 ENCOUNTER — OFFICE VISIT (OUTPATIENT)
Dept: OPHTHALMOLOGY | Facility: CLINIC | Age: 74
End: 2024-04-30
Payer: MEDICARE

## 2024-04-30 ENCOUNTER — APPOINTMENT (OUTPATIENT)
Dept: OPHTHALMOLOGY | Facility: CLINIC | Age: 74
End: 2024-04-30
Payer: MEDICARE

## 2024-04-30 DIAGNOSIS — H25.811 COMBINED FORM OF AGE-RELATED CATARACT, RIGHT EYE: ICD-10-CM

## 2024-04-30 DIAGNOSIS — H00.11 CHALAZION OF RIGHT UPPER EYELID: ICD-10-CM

## 2024-04-30 DIAGNOSIS — H35.361 MACULAR DRUSEN, RIGHT: ICD-10-CM

## 2024-04-30 DIAGNOSIS — Z96.1 PSEUDOPHAKIA: Primary | ICD-10-CM

## 2024-04-30 DIAGNOSIS — H52.03 HYPEROPIA, BILATERAL: ICD-10-CM

## 2024-04-30 DIAGNOSIS — H52.4 PRESBYOPIA: ICD-10-CM

## 2024-04-30 DIAGNOSIS — H52.203 ASTIGMATISM OF BOTH EYES, UNSPECIFIED TYPE: ICD-10-CM

## 2024-04-30 PROCEDURE — 1159F MED LIST DOCD IN RCRD: CPT | Performed by: OPHTHALMOLOGY

## 2024-04-30 PROCEDURE — 99024 POSTOP FOLLOW-UP VISIT: CPT | Performed by: OPHTHALMOLOGY

## 2024-04-30 PROCEDURE — 67800 REMOVE EYELID LESION: CPT | Performed by: OPHTHALMOLOGY

## 2024-04-30 PROCEDURE — 1160F RVW MEDS BY RX/DR IN RCRD: CPT | Performed by: OPHTHALMOLOGY

## 2024-04-30 RX ORDER — CYCLOPENTOLATE HYDROCHLORIDE 10 MG/ML
1 SOLUTION/ DROPS OPHTHALMIC
Status: CANCELLED | OUTPATIENT
Start: 2024-04-30 | End: 2024-04-30

## 2024-04-30 RX ORDER — PHENYLEPHRINE HYDROCHLORIDE 100 MG/ML
1 SOLUTION/ DROPS OPHTHALMIC
Status: CANCELLED | OUTPATIENT
Start: 2024-04-30 | End: 2024-04-30

## 2024-04-30 RX ORDER — TETRACAINE HYDROCHLORIDE 5 MG/ML
1 SOLUTION OPHTHALMIC ONCE
Status: CANCELLED | OUTPATIENT
Start: 2024-04-30 | End: 2024-04-30

## 2024-04-30 ASSESSMENT — REFRACTION_MANIFEST
OS_SPHERE: PLANO
OS_SPHERE: +0.25
OD_AXIS: 095
OD_SPHERE: +1.00
OD_AXIS: 040
OD_CYLINDER: -0.75
METHOD_AUTOREFRACTION: 1
OS_AXIS: 085
OS_CYLINDER: -1.25
OD_SPHERE: +1.25
OD_ADD: +2.50
OS_CYLINDER: -1.50
OS_ADD: +2.50
OS_AXIS: 085
OD_CYLINDER: -0.50

## 2024-04-30 ASSESSMENT — REFRACTION_WEARINGRX
OS_AXIS: 100
OS_ADD: +2.50
OS_CYLINDER: -1.50
OS_SPHERE: +2.50
OD_SPHERE: +1.00
OD_AXIS: 040
OD_CYLINDER: -0.75
OD_ADD: +2.50

## 2024-04-30 ASSESSMENT — VISUAL ACUITY
OD_CC: 20/40-
OS_SC: 20/40
OD_BAT_MED: 20/60
METHOD: SNELLEN - LINEAR

## 2024-04-30 ASSESSMENT — ENCOUNTER SYMPTOMS
CONSTITUTIONAL NEGATIVE: 0
MUSCULOSKELETAL NEGATIVE: 0
HEMATOLOGIC/LYMPHATIC NEGATIVE: 0
ENDOCRINE NEGATIVE: 0
RESPIRATORY NEGATIVE: 0
NEUROLOGICAL NEGATIVE: 0
CARDIOVASCULAR NEGATIVE: 0
EYES NEGATIVE: 1
ALLERGIC/IMMUNOLOGIC NEGATIVE: 0
GASTROINTESTINAL NEGATIVE: 0
PSYCHIATRIC NEGATIVE: 0

## 2024-04-30 ASSESSMENT — TONOMETRY
IOP_METHOD: GOLDMANN APPLANATION
OS_IOP_MMHG: 15
OD_IOP_MMHG: 17

## 2024-05-07 DIAGNOSIS — E78.00 HYPERCHOLESTEROLEMIA: ICD-10-CM

## 2024-05-07 RX ORDER — ROSUVASTATIN CALCIUM 40 MG/1
40 TABLET, COATED ORAL NIGHTLY
Qty: 90 TABLET | Refills: 3 | Status: SHIPPED | OUTPATIENT
Start: 2024-05-07

## 2024-05-07 NOTE — PROGRESS NOTES
Subjective   Patient ID: Annabel Cottrell is a 74 y.o. female.    HPI  Patient presents today in follow-up and for blood pressure check we had increased her valsartan from 80 mg to 160 mg and she has continued to have elevated blood pressure readings at home    seemslike BP is lowest in the am and higher in the pm She takes her antihypertensive in the evening time  She is concerned and that her sister has had history of what sounds like TIAs or multiple strokes  Otherwise she is doing okay has recently had cataract surgery but is also had an eye infection on her right side      Past medical history significant for  Hypertension  Hypercholesteremia  Chronic kidney disease  Thyroid cancer and hypothyroidism   History of remote endometrial cancer  Anxiety and depression   Breast cancer    Review of Systems    Objective   Physical Exam  Well-developed appears younger than age no acute distress  HEENT exam the right eye does appear somewhat erythematous  Lungs are clear to auscultation percussion  Cardiovascular regular rate and rhythm  Periphery is without edema    Assessment/Plan   #1 hypertension which is not under great control although certainly not a dangerous blood pressure today I would like to see this in tighter control since it seems that her morning blood pressures are much better compared to her evening ones we are going to have her take the valsartan 160 mg twice daily to hopefully cover the afternoon and evening blood pressure readings as well she will keep us updated as to her blood pressure control at home  2.  Hypercholesteremia continue current regimen she is on the Crestor  3.  History of breast cancer she has been stable with her current regimen  20 minutes were spent with patient of which greater than 50% was spent in counseling and coordination of care  This note was partially generated using the Dragon voice recognition system.  There may be some incorrect wording ,grammar, spelling or punctuation  errors that were not corrected prior to committing the note to the medical record.

## 2024-05-08 ENCOUNTER — OFFICE VISIT (OUTPATIENT)
Dept: PRIMARY CARE | Facility: CLINIC | Age: 74
End: 2024-05-08
Payer: MEDICARE

## 2024-05-08 VITALS — SYSTOLIC BLOOD PRESSURE: 142 MMHG | DIASTOLIC BLOOD PRESSURE: 88 MMHG

## 2024-05-08 DIAGNOSIS — I10 BENIGN ESSENTIAL HYPERTENSION: ICD-10-CM

## 2024-05-08 DIAGNOSIS — E78.00 HYPERCHOLESTEROLEMIA: Primary | ICD-10-CM

## 2024-05-08 PROCEDURE — 1160F RVW MEDS BY RX/DR IN RCRD: CPT | Performed by: INTERNAL MEDICINE

## 2024-05-08 PROCEDURE — 1036F TOBACCO NON-USER: CPT | Performed by: INTERNAL MEDICINE

## 2024-05-08 PROCEDURE — 3079F DIAST BP 80-89 MM HG: CPT | Performed by: INTERNAL MEDICINE

## 2024-05-08 PROCEDURE — 1159F MED LIST DOCD IN RCRD: CPT | Performed by: INTERNAL MEDICINE

## 2024-05-08 PROCEDURE — 99213 OFFICE O/P EST LOW 20 MIN: CPT | Performed by: INTERNAL MEDICINE

## 2024-05-08 PROCEDURE — 3077F SYST BP >= 140 MM HG: CPT | Performed by: INTERNAL MEDICINE

## 2024-05-08 RX ORDER — VALSARTAN 160 MG/1
160 TABLET ORAL 2 TIMES DAILY
Qty: 180 TABLET | Refills: 2 | Status: SHIPPED | OUTPATIENT
Start: 2024-05-08 | End: 2024-05-08 | Stop reason: SDUPTHER

## 2024-05-08 RX ORDER — VALSARTAN 160 MG/1
160 TABLET ORAL 2 TIMES DAILY
Qty: 180 TABLET | Refills: 2 | Status: SHIPPED | OUTPATIENT
Start: 2024-05-08

## 2024-05-09 PROBLEM — Z78.0 MENOPAUSE: Status: ACTIVE | Noted: 2024-05-09

## 2024-05-09 NOTE — PROGRESS NOTES
Patient ID: Annabel Cottrell is a 74 y.o. female.  BREAST CANCER DIAGNOSIS:         Breast         AJCC Edition: 8th (AJCC), Diagnosis Date: Dec 2019, Stage(no match), ypT2 ypN3 cM0  G2     Treatment Synopsis:    BREAST CANCER DIAGNOSIS  ypT2 ypN3M0 ER/WY+, HER2- lobular breast cancer        Treatment History:    1. Abnormal screening mammogram on 11/5/2019 showed architectural distortion in central lateral left breast.  2. 12/6/2019, left diagnostic mammogam/US demonstrated 1.1 cm irregular hypoechoic mass in 2:30 position, 3 cm from nipple with slightly abnormal  axillary lymph node. Biopsy of left breast and axillary lymph node yielded invasive lobular carcinoma grade 2, LCIS,  ER >95%, WY >95%, HER2 negative with metastatic carcinoma involving left axillary lymph node. Tumor board rec breast MRI, preoperative hormonal therapy vs primary surgery.   3. Breast MRI on 12/17/2019 demonstrated known biopsy-proven left breast mass with adjacent non-mass enhancement measuring up to 2.2 x 1.5 cm and left axillary level 1 metastatic lymphadenopathy.  Indeterminate right breast enhancing mass.  4. Us-guided needle biopsy of right breast on 12/19/2019 showed a mass at 8:00 4 CM  from nipple -- intraductal papilloma with focal atypia.   5. Preoperative hormonal treatment with anastrozole initiated 1/9/20.  She did not tolerate this (edema) and was switched to  exemestane 2/19/20. On 3/16 she stopped exemestane for side effects. She took two doses of anastrozole 4/1 and 4/2 then stopped for edema.  Letrozole initiated 4/8/20. MammaPrint on core was Low Risk Luminal A.  6. Progression on breast imaging 5/20/20. Staging was negative other than a single sclerotic rib lesion. Left partial mastectomy and SLN on 6/11/20 showed 3 cm residual tumor with multiple close and positive margins, 4/4 positive SLN. Right excision of  area of intraductal papilloma showed no additional findings. Reexcision left breast and ALND 7/1/20 showed LCIS  in the breast and additional 4/4 positive ALN for total of 8/8 positive LNs.  7. Postop chemotherapy with Docetaxel/Cytoxan x 4-6 cycles and radiation planned. First Docetaxel/Cytoxan 7/31/20. Stopped after 1st cycle because of hospital admission for sepsis/febrile neutropenia despite pegfilgrastim. Repeat PET/CT negative 8/2020.  8. Adjuvant RT completed 11/6/20.   9. Adjuvant letrozole initiated 12/2020. Due to intolerable symptoms, she switched to tamoxifen in July 2021.  10. Adjuvant Zometa initiated 1/2021. Unable to complete 6th dose October 2023 due to CKD. 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), Other abnormal and inconclusive findings on diagnostic imaging of breast (12/19/2019), Personal history of malignant neoplasm of thyroid (12/19/2019), Personal history of other malignant neoplasm of skin, Personal history of other medical treatment (09/08/2017), Personal history of other specified conditions (12/06/2019), Pneumonitis due to inhalation of food and vomit (Multi) (10/06/2016), and Vaginal atrophy (04/04/2023).HTN  Hypercholesterolemia  CKD III   Anxiety, Depression   Drusen   Osteopenia  Vitamin D deficiency  Hypothyroidism  GERD    Surgical History: Thyroid surgery (01/15/2014); Hysterectomy (08/03/2017, Left partial mastectomy,   Social HistoryLanguage preferred: English and Upper sorbian  Two daughter and four grandchildren. One daughter is a PCP physician  Exercise habits: Plays tennis. Active.   and lives with  Luis Miguel who works in  construction business   Never smoker  Social drinker  Work history: homemaker  Social Substance History:  ·  Social History denies smoking, alcohol and drug use   ·  Smoking Status never smoker (1)   ·  Additional History       FamHx:   Grandfather: tongue cancer (heavy smoker)    Positive with myocardial infarction and cardiac arrhythmia     ObGyn Hx:  Menarche at age 12   with first birth at 25  menopausal at age 48 with Premarin usage for severe postmenopausal symptoms. 21 years of HRT and stopped in Dec 2019 after Dx of BrCa.    Family History:    Family History   Problem Relation Name Age of Onset    Heart attack Mother      Heart attack Father      Other (cardiac arrhythmia) Sister       Family Oncology History:  Cancer-related family history is not on file.    HISTORY OF PRESENT ILLNESS:  Annabel Cottrell is a 74 y.o. female who returns today for Breast cancer surveillance and follow-up. She is accompanied by her  today. She continues compliant on Tamoxifen- she is tolerating this well and reports stable on and off hot flashes. She has no breast cancer concerns today.      She denies any chest pain or breathing issues, no cough, no sob.      She denies any headache issues, or loss of balance, no falls. She has baseline issues with vision- has been having surgery for cataracts and continued procedures with Dr Nguyen.      She denies any new or unexplained bone aches or pains.    She denies any skin lesions or masses, oral sores lesions or infections. She see derm regularly for dermatology.      She reports a normal appetite but works to eat less.  She reports continued stable constipation uses dietary changes.  She denies issues with urination.      She denies any issues with sleep, denies fatigue.       Review of Systems - Oncology  ROS 14 points performed, See HPI for exceptions    OBJECTIVE:    VS / Pain:  /86 (BP Location: Left arm, Patient Position: Sitting)   Pulse 72   Temp 36.3 °C (97.3  °F)   Resp 16   Wt 65.5 kg (144 lb 6.4 oz)   SpO2 97%   BMI 26.41 kg/m²   BSA: 1.69 meters squared   Pain Scale: 0  ECO- Fully active, able to carry on all pre-disease performance w/o restriction.      Performance Status:       Physical Exam  Constitutional: Well developed, awake/alert/oriented x4, no distress, alert and cooperative  EYES: Sclera clear  ENMT: mucous membranes moist, no apparent injury, no lesions seen  Head/Neck: Neck supple, no apparent injury, thyroid without mass or tenderness, No JVD, trachea midline, no bruits  Respiratory / Thoracic: Patent airways, clear to all lobes, normal breath sounds with good chest expansion, thorax symmetric.  Cardiovascular: Regular, rate and rhythm, no murmurs, 2+ equal pulses of the extremities, normal auscultated S 1and S 2  GI: Nondistended, soft, non-tender, no rebound tenderness or guarding, no masses palpable, no organomegaly, +BS, no bruits  Musculoskeletal: ROM intact, no joint swelling, normal strength, no spinal tenderness  Extremities: normal extremities, no cyanosis edema, contusions or wounds, no clubbing  Neurological: alert and oriented x4, intact senses, motor, response and reflexes, normal strength  Breast: s/p left partial mastectomy and radiation  therapy. Bilateral breasts free of any palpable masses or lesions, + tissue thickening effect of radiation therapy to left breast.   Lymphatic: No cervical, supraclavicular, infraclavicular or axillary lymphadenopathy  Psychological: Appropriate and talkative mood and behavior  Skin: Warm and dry, no lesions, no rashes, no jaundice    Diagnostic Results   Corneal Topography - OU - Both Eyes  Pentacam (24) - OD: Irregular. 45./45.7 @ 44.4. OS: Irregular.   45.3/45.6 @ 172.7.      Narrative & Impression  Interpreted By:  Elke Solano and Hanreck James   STUDY:  BI MAMMO BILATERAL SCREENING TOMOSYNTHESIS;  5/10/2024 9:53 am      ACCESSION NUMBER(S):  [id number]      ORDERING  CLINICIAN:  RAQUEL ROJAS      INDICATION:  Screening. Status post left lumpectomy.      COMPARISON:  04/24/2023 and 04/20/2022 mammograms      FINDINGS:  2D and tomosynthesis images were reviewed at 1 mm slice thickness.      Density:  The breast tissue is heterogeneously dense, which may  obscure small masses.      Stable lumpectomy changes are seen in the deep upper outer left  breast and postsurgical scarring in the right breast are again seen.  No suspicious masses or calcifications are identified.      IMPRESSION:  No mammographic evidence of malignancy.      BI-RADS CATEGORY:      BI-RADS Category:  2 Benign.  Recommendation:  Annual Screening.  Recommended Date:  1 Year.  Laterality:  Bilateral.      For any future breast imaging appointments, please call [phone]).  I personally reviewed the images/study and I agree with the resident  Aime Chandler's findings as stated. This study was interpreted  at [place], [place].      MACRO:  None      Signed by: Elke Solano 5/10/2024 10:19 AM        No visits with results within 6 Week(s) from this visit.   Latest known visit with results is:   Lab Requisition on 03/14/2024   Component Date Value Ref Range Status    Case Report 03/14/2024    Final                    Value:Surgical Pathology                                Case: [id number]                                  Authorizing Provider:  Tomás Toney MD         Collected:           03/14/2024 1058              Ordering Location:     [facility]       Received:            03/18/2024 1140                                     Center                                                                       Pathologist:           Alfonzo Delgado MD                                                       Specimen:    COLON - SIGMOID BIOPSY, COLON,SIGMOID POLYP,COLD SNARE,POLYPECTOMY                         FINAL DIAGNOSIS 03/14/2024    Final                     Value:This result contains rich text formatting which cannot be displayed here.      03/14/2024    Final                    Value:This result contains rich text formatting which cannot be displayed here.    Clinical History 03/14/2024    Final                    Value:This result contains rich text formatting which cannot be displayed here.    Gross Description 03/14/2024    Final                    Value:This result contains rich text formatting which cannot be displayed here.        Assessment/Plan   Carcinoma of left breast metastatic to axillary lymph node (Multi), Clinical: Stage IIIB (cT2, cN3, cM0, G2, ER+, MI+, HER2-)  Diagnoses and all orders for this visit:  Encounter for monitoring tamoxifen therapy (Primary)  Carcinoma of left breast metastatic to axillary lymph node (Multi)  Osteopenia, unspecified location  Menopause      Problem List Items Addressed This Visit       Carcinoma of left breast metastatic to axillary lymph node (Multi)    Osteopenia    Encounter for monitoring tamoxifen therapy - Primary    Menopause        ypT2 ypN3 cM0 ER/MI+, HER2- lobular breast cancer (left). Initially treated with neoadjuvant endocrine therapy with clinical progression. She  is s/p lumpectomy and ALND on 7/1/20. She was unable to tolerate adjuvant chemotherapy (received TC x1 cycle), but completed radiation therapy.  She was initiated on adjuvant letrozole in 12/2020; adjuvant bisphosphonates were also pursued. Due to grade  2-3 arthralgias, she was switched to tamoxifen in 7/2021.     - She is tolerating tamoxifen very well overall. Continue 20 mg daily, goal 10 years of therapy.     - Grade 1 hot flashes.  duloxetine has been discontinued, stable at this time      There is no evidence on clinical breast exam today for breast cancer recurrence. RTC in 6 months with annual mammogram     Osteopenia. DEXA 4/2022- normal results. Planned repeat this summer 2024-orders placed today   - Continue calcium  and vitamin D supplementation.  - Zometa, 6th was due Fall 2023, however worsening of CrCl 30. Discontinuation of Zometa, is established with Nephrology for CKD    General Health Maintenance / Hypothyroidism. Follows with PCP Dr Kenzie Meng.   She is established with Nephrology for CKD that is HTN provoked     H/o endometrial cancer. S/p hysterectomy and BSO.    At least 25 minutes of direct consultation was spent with the patient today reviewing her cancer care plan, educating and answering questions regarding ongoing follow up, greater than 50% in counseling and coordination of care        Treatment Plan:  Venous Access Orders        Thank you for the opportunity to be involved in the care of Annabel Cottrell.   We discussed the clinical significance of diagnosis, goals of care and treatment plan in detail.   Please do not hesitate to reach out with any questions. Thank you.     -------------------------------------------------------------------------------------------------------------------------------  Carmen Dorsey MSN, APRN, FNP-C  [facility]  Division of Medical Oncology- Breast   Collaborating Physician Dr. Nehemias Lopez   [facility] Betzy Baig [place] [address]  Phone: [phone]  Fax: [phone]  Available via Secure Chat    Confidential Peer Review Document-  Privilege  Privileged Pursuant to [place] Revised Code Section 2305.24, .25, .251 & .252

## 2024-05-10 ENCOUNTER — OFFICE VISIT (OUTPATIENT)
Dept: HEMATOLOGY/ONCOLOGY | Facility: HOSPITAL | Age: 74
End: 2024-05-10
Payer: MEDICARE

## 2024-05-10 ENCOUNTER — HOSPITAL ENCOUNTER (OUTPATIENT)
Dept: RADIOLOGY | Facility: HOSPITAL | Age: 74
Discharge: HOME | End: 2024-05-10
Payer: MEDICARE

## 2024-05-10 VITALS — HEIGHT: 62 IN | WEIGHT: 139 LBS | BODY MASS INDEX: 25.58 KG/M2

## 2024-05-10 VITALS
RESPIRATION RATE: 16 BRPM | DIASTOLIC BLOOD PRESSURE: 86 MMHG | BODY MASS INDEX: 26.41 KG/M2 | TEMPERATURE: 97.3 F | OXYGEN SATURATION: 97 % | HEART RATE: 72 BPM | SYSTOLIC BLOOD PRESSURE: 166 MMHG | WEIGHT: 144.4 LBS

## 2024-05-10 DIAGNOSIS — Z51.81 ENCOUNTER FOR MONITORING TAMOXIFEN THERAPY: ICD-10-CM

## 2024-05-10 DIAGNOSIS — Z79.810 ENCOUNTER FOR MONITORING TAMOXIFEN THERAPY: ICD-10-CM

## 2024-05-10 DIAGNOSIS — C50.912 CARCINOMA OF LEFT BREAST METASTATIC TO AXILLARY LYMPH NODE (MULTI): ICD-10-CM

## 2024-05-10 DIAGNOSIS — M85.80 OSTEOPENIA, UNSPECIFIED LOCATION: ICD-10-CM

## 2024-05-10 DIAGNOSIS — Z78.0 MENOPAUSE: ICD-10-CM

## 2024-05-10 DIAGNOSIS — C77.3 CARCINOMA OF LEFT BREAST METASTATIC TO AXILLARY LYMPH NODE (MULTI): ICD-10-CM

## 2024-05-10 DIAGNOSIS — Z12.31 ENCOUNTER FOR SCREENING MAMMOGRAM FOR MALIGNANT NEOPLASM OF BREAST: ICD-10-CM

## 2024-05-10 PROCEDURE — 1159F MED LIST DOCD IN RCRD: CPT | Performed by: NURSE PRACTITIONER

## 2024-05-10 PROCEDURE — 1126F AMNT PAIN NOTED NONE PRSNT: CPT | Performed by: NURSE PRACTITIONER

## 2024-05-10 PROCEDURE — 3077F SYST BP >= 140 MM HG: CPT | Performed by: NURSE PRACTITIONER

## 2024-05-10 PROCEDURE — 1036F TOBACCO NON-USER: CPT | Performed by: NURSE PRACTITIONER

## 2024-05-10 PROCEDURE — 3079F DIAST BP 80-89 MM HG: CPT | Performed by: NURSE PRACTITIONER

## 2024-05-10 PROCEDURE — 77063 BREAST TOMOSYNTHESIS BI: CPT | Performed by: RADIOLOGY

## 2024-05-10 PROCEDURE — 99214 OFFICE O/P EST MOD 30 MIN: CPT | Performed by: NURSE PRACTITIONER

## 2024-05-10 PROCEDURE — 77067 SCR MAMMO BI INCL CAD: CPT | Performed by: RADIOLOGY

## 2024-05-10 PROCEDURE — 77067 SCR MAMMO BI INCL CAD: CPT

## 2024-05-10 PROCEDURE — 1160F RVW MEDS BY RX/DR IN RCRD: CPT | Performed by: NURSE PRACTITIONER

## 2024-05-10 ASSESSMENT — PAIN SCALES - GENERAL: PAINLEVEL: 0-NO PAIN

## 2024-05-10 NOTE — PATIENT INSTRUCTIONS
April 2022 DEXA was normal. We will recheck again this summer- please complete     Mammogram today was normal. Next due AFTER 5/9/24     Carmen ALFRED CNP follow-up in 6 months      PCP Dr Meng annually and as scheduled      Call with any questions, concerns or treatment intolerance prior to next office visit [phone].

## 2024-05-16 ENCOUNTER — PATIENT MESSAGE (OUTPATIENT)
Dept: OPHTHALMOLOGY | Facility: CLINIC | Age: 74
End: 2024-05-16
Payer: MEDICARE

## 2024-05-16 DIAGNOSIS — H00.11 CHALAZION OF RIGHT UPPER EYELID: Primary | ICD-10-CM

## 2024-05-17 RX ORDER — DOXYCYCLINE HYCLATE 50 MG/1
CAPSULE ORAL
Qty: 35 CAPSULE | Refills: 0 | Status: SHIPPED | OUTPATIENT
Start: 2024-05-17

## 2024-05-29 ENCOUNTER — ANESTHESIA EVENT (OUTPATIENT)
Dept: OPERATING ROOM | Facility: CLINIC | Age: 74
End: 2024-05-29
Payer: MEDICARE

## 2024-05-30 ENCOUNTER — HOSPITAL ENCOUNTER (OUTPATIENT)
Facility: CLINIC | Age: 74
Setting detail: OUTPATIENT SURGERY
Discharge: HOME | End: 2024-05-30
Attending: OPHTHALMOLOGY | Admitting: OPHTHALMOLOGY
Payer: MEDICARE

## 2024-05-30 ENCOUNTER — ANESTHESIA (OUTPATIENT)
Dept: OPERATING ROOM | Facility: CLINIC | Age: 74
End: 2024-05-30
Payer: MEDICARE

## 2024-05-30 VITALS
HEART RATE: 73 BPM | DIASTOLIC BLOOD PRESSURE: 65 MMHG | HEIGHT: 62 IN | BODY MASS INDEX: 26.25 KG/M2 | SYSTOLIC BLOOD PRESSURE: 138 MMHG | OXYGEN SATURATION: 98 % | WEIGHT: 142.64 LBS | RESPIRATION RATE: 18 BRPM | TEMPERATURE: 98.8 F

## 2024-05-30 DIAGNOSIS — Z96.1 PSEUDOPHAKIA: Primary | ICD-10-CM

## 2024-05-30 DIAGNOSIS — H25.811 COMBINED FORM OF AGE-RELATED CATARACT, RIGHT EYE: ICD-10-CM

## 2024-05-30 PROCEDURE — 2500000004 HC RX 250 GENERAL PHARMACY W/ HCPCS (ALT 636 FOR OP/ED): Performed by: NURSE ANESTHETIST, CERTIFIED REGISTERED

## 2024-05-30 PROCEDURE — 7100000010 HC PHASE TWO TIME - EACH INCREMENTAL 1 MINUTE: Performed by: OPHTHALMOLOGY

## 2024-05-30 PROCEDURE — 3700000002 HC GENERAL ANESTHESIA TIME - EACH INCREMENTAL 1 MINUTE: Performed by: OPHTHALMOLOGY

## 2024-05-30 PROCEDURE — C1780 LENS, INTRAOCULAR (NEW TECH): HCPCS | Performed by: OPHTHALMOLOGY

## 2024-05-30 PROCEDURE — 7100000009 HC PHASE TWO TIME - INITIAL BASE CHARGE: Performed by: OPHTHALMOLOGY

## 2024-05-30 PROCEDURE — 2500000004 HC RX 250 GENERAL PHARMACY W/ HCPCS (ALT 636 FOR OP/ED): Performed by: OPHTHALMOLOGY

## 2024-05-30 PROCEDURE — 2500000005 HC RX 250 GENERAL PHARMACY W/O HCPCS: Performed by: OPHTHALMOLOGY

## 2024-05-30 PROCEDURE — 66984 XCAPSL CTRC RMVL W/O ECP: CPT | Performed by: OPHTHALMOLOGY

## 2024-05-30 PROCEDURE — 3600000008 HC OR TIME - EACH INCREMENTAL 1 MINUTE - PROCEDURE LEVEL THREE: Performed by: OPHTHALMOLOGY

## 2024-05-30 PROCEDURE — 2500000001 HC RX 250 WO HCPCS SELF ADMINISTERED DRUGS (ALT 637 FOR MEDICARE OP): Performed by: OPHTHALMOLOGY

## 2024-05-30 PROCEDURE — 3700000001 HC GENERAL ANESTHESIA TIME - INITIAL BASE CHARGE: Performed by: OPHTHALMOLOGY

## 2024-05-30 PROCEDURE — 2500000005 HC RX 250 GENERAL PHARMACY W/O HCPCS: Performed by: NURSE ANESTHETIST, CERTIFIED REGISTERED

## 2024-05-30 PROCEDURE — 3600000003 HC OR TIME - INITIAL BASE CHARGE - PROCEDURE LEVEL THREE: Performed by: OPHTHALMOLOGY

## 2024-05-30 PROCEDURE — 2500000004 HC RX 250 GENERAL PHARMACY W/ HCPCS (ALT 636 FOR OP/ED): Performed by: STUDENT IN AN ORGANIZED HEALTH CARE EDUCATION/TRAINING PROGRAM

## 2024-05-30 RX ORDER — LIDOCAINE IN NACL,ISO-OSMOT/PF 30 MG/3 ML
0.1 SYRINGE (ML) INJECTION ONCE
Status: DISCONTINUED | OUTPATIENT
Start: 2024-05-30 | End: 2024-05-30 | Stop reason: HOSPADM

## 2024-05-30 RX ORDER — POVIDONE-IODINE 5 %
SOLUTION, NON-ORAL OPHTHALMIC (EYE) AS NEEDED
Status: DISCONTINUED | OUTPATIENT
Start: 2024-05-30 | End: 2024-05-30 | Stop reason: HOSPADM

## 2024-05-30 RX ORDER — CYCLOPENTOLATE HYDROCHLORIDE 10 MG/ML
1 SOLUTION/ DROPS OPHTHALMIC
Status: COMPLETED | OUTPATIENT
Start: 2024-05-30 | End: 2024-05-30

## 2024-05-30 RX ORDER — PHENYLEPHRINE HYDROCHLORIDE 100 MG/ML
1 SOLUTION/ DROPS OPHTHALMIC
Status: COMPLETED | OUTPATIENT
Start: 2024-05-30 | End: 2024-05-30

## 2024-05-30 RX ORDER — MIDAZOLAM HYDROCHLORIDE 1 MG/ML
INJECTION, SOLUTION INTRAMUSCULAR; INTRAVENOUS AS NEEDED
Status: DISCONTINUED | OUTPATIENT
Start: 2024-05-30 | End: 2024-05-30

## 2024-05-30 RX ORDER — LIDOCAINE HYDROCHLORIDE 20 MG/ML
INJECTION, SOLUTION INFILTRATION; PERINEURAL AS NEEDED
Status: DISCONTINUED | OUTPATIENT
Start: 2024-05-30 | End: 2024-05-30

## 2024-05-30 RX ORDER — FENTANYL CITRATE 50 UG/ML
INJECTION, SOLUTION INTRAMUSCULAR; INTRAVENOUS AS NEEDED
Status: DISCONTINUED | OUTPATIENT
Start: 2024-05-30 | End: 2024-05-30

## 2024-05-30 RX ORDER — TETRACAINE HYDROCHLORIDE 5 MG/ML
SOLUTION OPHTHALMIC AS NEEDED
Status: DISCONTINUED | OUTPATIENT
Start: 2024-05-30 | End: 2024-05-30 | Stop reason: HOSPADM

## 2024-05-30 RX ORDER — EPINEPHRINE 1 MG/ML
INJECTION, SOLUTION, CONCENTRATE INTRAVENOUS AS NEEDED
Status: DISCONTINUED | OUTPATIENT
Start: 2024-05-30 | End: 2024-05-30 | Stop reason: HOSPADM

## 2024-05-30 RX ORDER — TETRACAINE HYDROCHLORIDE 5 MG/ML
1 SOLUTION OPHTHALMIC ONCE
Status: COMPLETED | OUTPATIENT
Start: 2024-05-30 | End: 2024-05-30

## 2024-05-30 RX ORDER — SODIUM CHLORIDE, SODIUM LACTATE, POTASSIUM CHLORIDE, CALCIUM CHLORIDE 600; 310; 30; 20 MG/100ML; MG/100ML; MG/100ML; MG/100ML
100 INJECTION, SOLUTION INTRAVENOUS CONTINUOUS
Status: DISCONTINUED | OUTPATIENT
Start: 2024-05-30 | End: 2024-05-30 | Stop reason: HOSPADM

## 2024-05-30 RX ADMIN — CYCLOPENTOLATE HYDROCHLORIDE 1 DROP: 10 SOLUTION/ DROPS OPHTHALMIC at 12:50

## 2024-05-30 RX ADMIN — MIDAZOLAM 2 MG: 1 INJECTION INTRAMUSCULAR; INTRAVENOUS at 13:23

## 2024-05-30 RX ADMIN — FENTANYL CITRATE 25 MCG: 50 INJECTION, SOLUTION INTRAMUSCULAR; INTRAVENOUS at 13:25

## 2024-05-30 RX ADMIN — PHENYLEPHRINE HYDROCHLORIDE 1 DROP: 100 SOLUTION/ DROPS OPHTHALMIC at 12:40

## 2024-05-30 RX ADMIN — FENTANYL CITRATE 25 MCG: 50 INJECTION, SOLUTION INTRAMUSCULAR; INTRAVENOUS at 13:43

## 2024-05-30 RX ADMIN — CYCLOPENTOLATE HYDROCHLORIDE 1 DROP: 10 SOLUTION/ DROPS OPHTHALMIC at 12:45

## 2024-05-30 RX ADMIN — PHENYLEPHRINE HYDROCHLORIDE 1 DROP: 100 SOLUTION/ DROPS OPHTHALMIC at 12:45

## 2024-05-30 RX ADMIN — MANNITOL 12.5 G: 250 INJECTION, SOLUTION INTRAVENOUS at 12:46

## 2024-05-30 RX ADMIN — TETRACAINE HYDROCHLORIDE 1 DROP: 5 SOLUTION OPHTHALMIC at 12:40

## 2024-05-30 RX ADMIN — LIDOCAINE HYDROCHLORIDE 60 MG: 20 INJECTION, SOLUTION INFILTRATION; PERINEURAL at 13:41

## 2024-05-30 RX ADMIN — CYCLOPENTOLATE HYDROCHLORIDE 1 DROP: 10 SOLUTION/ DROPS OPHTHALMIC at 12:40

## 2024-05-30 RX ADMIN — PHENYLEPHRINE HYDROCHLORIDE 1 DROP: 100 SOLUTION/ DROPS OPHTHALMIC at 12:50

## 2024-05-30 ASSESSMENT — COLUMBIA-SUICIDE SEVERITY RATING SCALE - C-SSRS
6. HAVE YOU EVER DONE ANYTHING, STARTED TO DO ANYTHING, OR PREPARED TO DO ANYTHING TO END YOUR LIFE?: NO
1. IN THE PAST MONTH, HAVE YOU WISHED YOU WERE DEAD OR WISHED YOU COULD GO TO SLEEP AND NOT WAKE UP?: NO
1. IN THE PAST MONTH, HAVE YOU WISHED YOU WERE DEAD OR WISHED YOU COULD GO TO SLEEP AND NOT WAKE UP?: NO
2. HAVE YOU ACTUALLY HAD ANY THOUGHTS OF KILLING YOURSELF?: NO
2. HAVE YOU ACTUALLY HAD ANY THOUGHTS OF KILLING YOURSELF?: NO
6. HAVE YOU EVER DONE ANYTHING, STARTED TO DO ANYTHING, OR PREPARED TO DO ANYTHING TO END YOUR LIFE?: NO

## 2024-05-30 ASSESSMENT — PAIN SCALES - GENERAL
PAINLEVEL_OUTOF10: 0 - NO PAIN

## 2024-05-30 ASSESSMENT — ENCOUNTER SYMPTOMS
CARDIOVASCULAR NEGATIVE: 1
PSYCHIATRIC NEGATIVE: 1
GASTROINTESTINAL NEGATIVE: 1
CONSTITUTIONAL NEGATIVE: 1
RESPIRATORY NEGATIVE: 1

## 2024-05-30 ASSESSMENT — PAIN - FUNCTIONAL ASSESSMENT
PAIN_FUNCTIONAL_ASSESSMENT: 0-10

## 2024-05-30 NOTE — OP NOTE
Phacoemulsification Cataract with Insertion Intraocular Lens (R) Operative Note     Date: 2024  OR Location: [facility] OR    Name: Annabel Cottrell : 1950, Age: 74 y.o., MRN: [id number], Sex: female    Diagnosis  Pre-op Diagnosis     * Combined form of age-related cataract, right eye [H25.811] Post-op Diagnosis     * Combined form of age-related cataract, right eye [H25.811]     Procedures  Phacoemulsification Cataract with Insertion Intraocular Lens  44324 - MT XCAPSL CTRC RMVL INSJ IO LENS PROSTH W/O ECP      Surgeons      * Joselin Heath - Primary    Resident/Fellow/Other Assistant:  Surgeons and Role:  * No surgeons found with a matching role *    Procedure Summary  Anesthesia: Monitor Anesthesia Care  ASA: II  Anesthesia Staff: Anesthesiologist: Luis Canela DO  CRNA: EVELYN Aguirre  Estimated Blood Loss: 0 mL  Intra-op Medications:   Administrations occurring from 1330 to 1420 on 24:   Medication Name Total Dose   balanced salts (BSS) intraocular solution 515 mL   EPINEPHrine HCl (PF) (Adrenalin) injection 0.3 mg   chondroitin sulf-sod hyaluron (Duovisc) intraocular kit 1 mL   EPINEPHrine (Adrenalin) 1 mL, lidocaine PF (Xylocaine) 10 mg/mL (1 %) 1 mL syringe 1 mL              Anesthesia Record               Intraprocedure I/O Totals       None           Specimen: No specimens collected     Staff:   Circulator: Ofelia Brown Person: Sherri         Drains and/or Catheters: * None in log *    Tourniquet Times:         Implants:  Implants       Type Name Action Serial No.       6.0MM X 13MM TECNIS EYHANCE INTRAOCULAR LENS, 1-PIECE, 29.5 DIOPTER, W/TECNIS SIMPLICITY DELIVERY SYSTEM, BICONVEX, UV-BLOCKING HYDROPHOBIC ACRYLIC Implanted [id number]              Findings: as below    Indications: Annabel Cottrell is an 74 y.o. female who is having surgery for Combined form of age-related cataract, right eye [H25.811].     Procedure Details: Patient name: Annabel Cottrell   Date of birth:  1950   MRN: [id number]   Date of surgery: May 30, 2024  Preoperative diagnosis: Combined cataract of the RIGHT eye  Postoperative diagnosis: Combined cataract of the RIGHT eye  Procedure: Phacoemulsification of cataract with insertion of intraocular lens, RIGHT eye   Surgeon: Joselin Heath MD  Resident: Shanti Gottlieb MD  Anesthesia: MAC  Complications: None  Lens Inserted: Slim & Slim DIB00 with a power of +29.50 D. Serial #: [id number]. Exp date: 07/25/2024..    Procedure description: After the risks, benefits, and alternatives of the planned procedure were discussed with the patient, informed consent was obtained at the preoperative evaluation. On the day of surgery, there were no updates to the consent form and any patient questions were answered. The patient was correctly identified in the preoperative area and the RIGHT eye was marked as the operative eye. Dilating drops were instilled into the RIGHT eye in the preoperative area. The patient also received IV mannitol in the preoperative area. The patient was then taken back to the operating room and placed under sedation. The patient was prepped and draped in the standard, sterile ophthalmic fashion in preparation for intraocular surgery.    A lid speculum was placed into the RIGHT palpebral fissure and the operating microscope was brought into position. A paracentesis was made in superotemporal cornea at the limbus with a 1.0mm side port blade using a cotton tipped applicator to provide countertraction. Intracameral epishugarcaine was injected into the anterior chamber followed by Viscoat viscoelastic. Using 0.12 forceps to stabilize the globe, a 2.4mm keratome blade was used to create a limbal clear-corneal incision inferotemporally. A bent-needle cystotome and Utrata forceps were used to create a continuous curvilinear capsulorrhexis. Balanced salt saline solution on a blunt-tipped cannula was used to achieve hydrodissection.    A  phacoemulsification device and a [place] spatula were used to remove the nucleus using a divide-and-conquer technique. Residual cortical material was removed with the irrigation and aspiration handpiece. Provisc viscoelastic was then injected into the eye to reform the anterior chamber and to open the capsular bag. The posterior capsule was inspected and found to be clean and intact. The intraocular lens, Slim & Slim DIB00 with a power of +29.50 diopters was injected into the capsular bag. A lens positioner was used to center the lens and ensure good position within the bag. The remaining viscoelastic was removed using irrigation and aspiration. Balanced salt saline solution on a blunt-tipped cannula was then used to hydrate the corneal stroma adjacent to the main wound and paracentesis site as well as to reform the anterior chamber. The temporal main incision was then closed with a single 10-0 vicryl suture in an interrupted fashion. The wound was checked and found to be watertight with normal intraocular pressure verified using digital palpation. At the conclusion of the case, a well-centered intraocular lens with a good red reflex was observed. Tetracaine, betadine, BSS, and prednisolone acetate drops were instilled into the RIGHT eye. The lid speculum and drapes were removed. A clear plastic shield was then taped over the eye. The patient was taken to the recovery room in stable condition, having tolerated the procedure well. There were no complications.        Complications:  None; patient tolerated the procedure well.    Disposition: PACU - hemodynamically stable.  Condition: stable         Additional Details: none    Attending Attestation: I performed the procedure.    Joselin Heath  Phone Number: [phone]       breath sounds clear and equal bilaterally.

## 2024-05-30 NOTE — ANESTHESIA POSTPROCEDURE EVALUATION
Patient: Annabel Cottrell    Procedure Summary       Date: 05/30/24 Room / Location: [facility] [place] [facility] 03 / [facility]    Anesthesia Start: 1314 Anesthesia Stop: 1359    Procedure: Phacoemulsification Cataract with Insertion Intraocular Lens (Right: Eye) Diagnosis:       Combined form of age-related cataract, right eye      (Combined form of age-related cataract, right eye [H25.811])    Surgeons: Joselin Heath MD Responsible Provider: Luis Canela DO    Anesthesia Type: MAC ASA Status: 2            Anesthesia Type: MAC    Vitals Value Taken Time   /65 05/30/24 1426   Temp 37.1 °C (98.8 °F) 05/30/24 1426   Pulse 73 05/30/24 1426   Resp 18 05/30/24 1426   SpO2 98 % 05/30/24 1426       Anesthesia Post Evaluation    Patient location during evaluation: PACU  Patient participation: complete - patient participated  Level of consciousness: awake  Pain management: satisfactory to patient  Multimodal analgesia pain management approach  Airway patency: patent  Cardiovascular status: acceptable  Respiratory status: acceptable  Hydration status: acceptable  Postoperative Nausea and Vomiting: none    There were no known notable events for this encounter.

## 2024-05-30 NOTE — DISCHARGE INSTRUCTIONS
Surgeon: Joselin Heath MD     Patient name: Annabel Cottrell    Date of surgery: May 30, 2024        DROP INSTRUCTIONS:    Prednisolone acetate 1% (pink or white cap) - One drop 4 times a day to operative eye    Ofloxacin (tan cap) - One drop 4 times a day to operative eye    Ketorolac (gray cap) - One drop 4 times a day to operative eye    When putting different drops in around the same administration time, please wait 1-2 minutes between drops  Avoid sleeping on side of operative eye  No heavy lifting over 10-15lbs and no bending over  Do not get eye wet, no eye rubbing  Wear sunglasses or glasses during the day and the shield when sleeping at night  Please call immediately if you develop any redness, pain, decreased vision, flashes, or floaters      During office hours (8:30a-4:30p): [phone]  After office hours: [phone]  [facility] : [phone]   Pager: [phone] for Dr. Banda

## 2024-05-30 NOTE — ANESTHESIA PREPROCEDURE EVALUATION
Patient: Annabel Cottrell    Procedure Information       Date/Time: 05/30/24 1330    Procedure: Phacoemulsification Cataract with Insertion Intraocular Lens (Right: Eye)    [place] [facility] [place] [facility] 03 / [facility] [place] [facility]    Surgeons: Joselin Heath MD            Relevant Problems   Anesthesia (within normal limits)      Cardiac   (+) Benign essential hypertension   (+) Coronary atherosclerosis   (+) Hypercholesterolemia      Pulmonary (within normal limits)      Neuro   (+) Depression   (+) Depression, major, in remission (CMS-HCC)   (+) Other specified anxiety disorders      GI   (+) GERD (gastroesophageal reflux disease) (Well controlled)      /Renal (within normal limits)      Liver (within normal limits)      Endocrine   (+) Class 1 obesity with body mass index (BMI) of 30.0 to 30.9 in adult   (+) Hypothyroidism      Hematology (within normal limits)      Musculoskeletal (within normal limits)      HEENT (within normal limits)      ID   (+) Herpes simplex      Skin (within normal limits)      GYN (within normal limits)       Clinical information reviewed:   Tobacco  Allergies  Meds   Med Hx  Surg Hx   Fam Hx  Soc Hx        NPO Detail:  NPO/Void Status  Carbohydrate Drink Given Prior to Surgery? : N  Date of Last Liquid: 05/30/24  Time of Last Liquid: 0930  Date of Last Solid: 05/29/24  Time of Last Solid: 2100  Last Intake Type: Clear fluids         Physical Exam    Airway  Mallampati: I  TM distance: >3 FB  Neck ROM: full     Cardiovascular    Dental - normal exam     Pulmonary    Abdominal        Anesthesia Plan    History of general anesthesia?: yes  History of complications of general anesthesia?: no    ASA 2     MAC     intravenous induction   Anesthetic plan and risks discussed with patient.    Plan discussed with CRNA.

## 2024-05-30 NOTE — H&P
History Of Present Illness  Annabel Cottrell is a 74 y.o. female presenting with right eye cataract. Here for right eye cataract extraction and intraocular lens placement.     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    Malignant neoplasm of uterine fundus    Other abnormal and inconclusive findings on diagnostic imaging of breast 12/19/2019    Abnormal MRI, breast    Personal history of malignant neoplasm of thyroid 12/19/2019    History of malignant neoplasm of thyroid    Personal history of other malignant neoplasm of skin     History of other malignant neoplasm of skin    Personal history of other medical treatment 09/08/2017    History of screening mammography    Personal history of other specified conditions 12/06/2019    History of abnormal mammogram    Pneumonitis due to inhalation of food and vomit (Multi) 10/06/2016    Aspiration pneumonia of both upper lobes due to gastric secretions    Vaginal atrophy 04/04/2023       Surgical History  Past Surgical History:   Procedure Laterality Date    CATARACT EXTRACTION Left 04/25/2024    Dr. Banda    HYSTERECTOMY  08/03/2017    Hysterectomy    OTHER SURGICAL HISTORY  06/25/2020    Mastectomy partial    OTHER SURGICAL HISTORY  06/25/2020    [place] lymph node biopsy procedure    OTHER SURGICAL HISTORY  07/06/2020    Axillary lymphadenectomy    THYROID SURGERY  01/15/2014    Thyroid Surgery        Social History  She reports that she has never  smoked. She has never used smokeless tobacco. She reports current alcohol use. She reports that she does not use drugs.    Family History  Family History   Problem Relation Name Age of Onset    Heart attack Mother      Heart attack Father      Other (cardiac arrhythmia) Sister          Allergies  Aloe vera latex gloves [gloves, latex with aloe vera] and Codeine    Review of Systems   Constitutional: Negative.    Respiratory: Negative.     Cardiovascular: Negative.    Gastrointestinal: Negative.    Psychiatric/Behavioral: Negative.          Physical Exam  Constitutional:       Appearance: Normal appearance.   HENT:      Head: Normocephalic and atraumatic.   Cardiovascular:      Rate and Rhythm: Regular rhythm.   Pulmonary:      Effort: Pulmonary effort is normal.   Abdominal:      General: Abdomen is flat.      Palpations: Abdomen is soft.   Neurological:      Mental Status: She is alert.          Last Recorded Vitals  There were no vitals taken for this visit.    Relevant Results        right eye cataract. Here for right eye cataract extraction and intraocular lens placement.     Assessment/Plan   Principal Problem:    Combined form of age-related cataract, right eye      right eye cataract. Here for right eye cataract extraction and intraocular lens placement.           Shanti Gottlieb MD

## 2024-05-31 ENCOUNTER — OFFICE VISIT (OUTPATIENT)
Dept: OPHTHALMOLOGY | Facility: CLINIC | Age: 74
End: 2024-05-31
Payer: MEDICARE

## 2024-05-31 DIAGNOSIS — H52.203 ASTIGMATISM OF BOTH EYES, UNSPECIFIED TYPE: ICD-10-CM

## 2024-05-31 DIAGNOSIS — H52.4 PRESBYOPIA: ICD-10-CM

## 2024-05-31 DIAGNOSIS — Z96.1 PSEUDOPHAKIA: Primary | ICD-10-CM

## 2024-05-31 DIAGNOSIS — H52.03 HYPEROPIA, BILATERAL: ICD-10-CM

## 2024-05-31 DIAGNOSIS — H35.361 MACULAR DRUSEN, RIGHT: ICD-10-CM

## 2024-05-31 PROCEDURE — 99024 POSTOP FOLLOW-UP VISIT: CPT | Performed by: OPHTHALMOLOGY

## 2024-05-31 RX ORDER — BACITRACIN ZINC AND POLYMYXIN B SULFATE 500; 10000 [USP'U]/G; [USP'U]/G
OINTMENT OPHTHALMIC
Qty: 3.5 G | Refills: 1 | Status: SHIPPED | OUTPATIENT
Start: 2024-05-31

## 2024-05-31 RX ORDER — BACITRACIN 500 [USP'U]/G
OINTMENT OPHTHALMIC
Qty: 3.5 G | Refills: 1 | Status: SHIPPED | OUTPATIENT
Start: 2024-05-31 | End: 2024-06-10

## 2024-05-31 ASSESSMENT — EXTERNAL EXAM - LEFT EYE: OS_EXAM: NORMAL

## 2024-05-31 ASSESSMENT — ENCOUNTER SYMPTOMS
ENDOCRINE NEGATIVE: 0
CARDIOVASCULAR NEGATIVE: 0
NEUROLOGICAL NEGATIVE: 0
HEMATOLOGIC/LYMPHATIC NEGATIVE: 0
ALLERGIC/IMMUNOLOGIC NEGATIVE: 0
MUSCULOSKELETAL NEGATIVE: 0
RESPIRATORY NEGATIVE: 0
CONSTITUTIONAL NEGATIVE: 0
GASTROINTESTINAL NEGATIVE: 0
PSYCHIATRIC NEGATIVE: 0
EYES NEGATIVE: 1

## 2024-05-31 ASSESSMENT — VISUAL ACUITY
OD_SC: 20/30
OD_SC+: +1
METHOD: SNELLEN - LINEAR

## 2024-05-31 ASSESSMENT — TONOMETRY
IOP_METHOD: GOLDMANN APPLANATION
OD_IOP_MMHG: 17

## 2024-05-31 ASSESSMENT — EXTERNAL EXAM - RIGHT EYE: OD_EXAM: NORMAL

## 2024-05-31 NOTE — PATIENT INSTRUCTIONS
Surgeon: Joselin Heath MD      Patient name: Annabel Cottrell    Date of visit: May 31, 2024      left eye    Prednisolone acetate (pink or white cap) - 4 times a day    Ofloxacin (tan cap) - 4 times a day    Ketorolac (gray cap) - 4 times a day    Bacitracin ophthalmic ointment - 1/2 inch to right eye at bedtime      No heavy lifting over 10-15 lbs, no bending over, do not get eye wet, no eye rubbing. Wear eye shield at bedtime and sunglasses/glasses during the day. Please call immediately if you develop any redness, pain, decreased vision, flashes, floaters.       Surgical Scheduler (during office hours): Atiya: [phone]  Office phone (after hours): [phone]-[facility] : [phone]                     Pager: [phone] for Dr. Banda

## 2024-05-31 NOTE — PROGRESS NOTES
Pseudophakia of right eyeZ96.1 - 5/30/24 - Preoperative mannitol.  -Doing well. Good vision. IOP good.  Prednisolone acetate 1% - 4 times a day  Ofloxacin - 4 times a day  Ketorolac - 4 times a day  Bacitracin-polymyxin ophthalmic ointment (original Rx bacitracin unavailable) - 1/2 inch to right eye at bedtime until next visit  No heavy lifting over 10-15 lbs, no bending over, do not get eye wet, no eye rubbing. Wear eye shield at bedtime and sunglasses/glasses during the day. Advised to call if any redness, pain, decreased vision, flashes, floaters. F/u 1 week - refract both eyes (autorefract first).     Pseudophakia of left eyeZ96.1 - 4/25/24 - Preoperative mannitol  -Doing well. Good vision. IOP good. Off all gtts.   -History of noticing temporal shadow OS x few days - likely negative dysphotopsia - discussed etiology and prognosis. Recommend observation and give time for neuroadaptation.     Chalazion - right upper eyelid  -Mostly resolved s/p I&C 4/30/24 - now taking Doxycycline 50mg PO daily.   -Continue warm compresses  -May use neomycin-polymyxin-dexamethasone ophthalmic ointment as needed.     Macular drusen, right  -Not visually significant at this time. Monitor.   -OCT macula (3/5/24) - Normal thickness and contour OU. Intact EZ OU. No edema OU. Rare small   drusen peripheral macula OD. 266/259  -Not visually significant at this time. Monitor. Will consider referral to retina service in the future if any worsening of condition.     Cup to disc asymmetry  -No FH of glaucoma  -Minor cupping OS>OD  -OCT RNFL (3/5/24) - OD: WNL. OS: Thin ST, bord T. 97/84.   -Low suspicion for glaucoma at this time. Plan to recheck OCT RNFL in 1 year.     Hyperopia, bilateral  Astigmatism of both eyes, unspecified type  Presbyopia  -History of wearing daily disposable contact lens (CL) -History of monovision OD for near and OS for distance: +7.50 OD and +4.50 OS  -Tried multifocal CL in the past and did not like.   -F/u 1  week - refract both eyes (autorefract first).      No history of refractive surgery.   No FH of AMD/glaucoma

## 2024-06-03 DIAGNOSIS — C77.3 CARCINOMA OF LEFT BREAST METASTATIC TO AXILLARY LYMPH NODE (MULTI): ICD-10-CM

## 2024-06-03 DIAGNOSIS — M85.80 OSTEOPENIA, UNSPECIFIED LOCATION: ICD-10-CM

## 2024-06-03 DIAGNOSIS — C50.912 CARCINOMA OF LEFT BREAST METASTATIC TO AXILLARY LYMPH NODE (MULTI): ICD-10-CM

## 2024-06-03 RX ORDER — TAMOXIFEN CITRATE 20 MG/1
20 TABLET ORAL DAILY
Qty: 90 TABLET | Refills: 3 | Status: SHIPPED | OUTPATIENT
Start: 2024-06-03 | End: 2025-06-03

## 2024-06-03 NOTE — TELEPHONE ENCOUNTER
Refill request received for Tamoxifen 20mg.  Preferred pharmacy is [facility] at [address] [facility] in [place].  Medication pended to provider.

## 2024-06-07 ENCOUNTER — APPOINTMENT (OUTPATIENT)
Dept: OPHTHALMOLOGY | Facility: CLINIC | Age: 74
End: 2024-06-07
Payer: MEDICARE

## 2024-06-09 ASSESSMENT — EXTERNAL EXAM - LEFT EYE: OS_EXAM: NORMAL

## 2024-06-09 ASSESSMENT — EXTERNAL EXAM - RIGHT EYE: OD_EXAM: NORMAL

## 2024-06-09 NOTE — PATIENT INSTRUCTIONS
Surgeon: Joselin Heath MD                   [phone]      Patient name: Annabel Cottrell    Date of visit: 6/11/24      right eye    Prednisolone acetate (pink or white cap) - 3 times a day for 1 week, 2 times a day for 1 week, once a day for 1 week, then stop    Ofloxacin (tan cap) -STOP    Ketorolac (gray cap) - STOP    Brimonidine-polymyxin ophthalmic ointment - STOP      No heavy lifting over 15-20 lbs, try not to get eye wet, no eye rubbing. You may stop wearing the eye shield at night. Please continue wearing glasses or sunglasses during the day to protect your eyes. Please call immediately if you develop any redness, pain, decreased vision, flashes, floaters.     Surgical Scheduler (during office hours) - Atiya: [phone]

## 2024-06-09 NOTE — PROGRESS NOTES
Pseudophakia of right eyeZ96.1 - 5/30/24 - Preoperative mannitol.  -Doing well. Good vision. IOP good.  Prednisolone acetate 1% - 3/2/1 weekly taper  Ofloxacin - STOP  Ketorolac - STOP  Bacitracin-polymyxin ophthalmic ointment - STOP  No heavy lifting over 15-20 lbs, do not get eye wet, no eye rubbing. Discontinue eye shield at bedtime but wear sunglasses/glasses during the day. Advised to call if any redness, pain, decreased vision, flashes, floaters. F/u 4-6 weeks - refract both eyes and dilate right eye.     Pseudophakia of left eyeZ96.1 - 4/25/24 - Preoperative mannitol  -Doing well. Good vision. IOP good. Off all gtts.   -History of noticing temporal shadow OS x few days - likely negative dysphotopsia - discussed etiology and prognosis. Recommend observation and give time for neuroadaptation.     Chalazion - right upper eyelid  -Mostly resolved s/p I&C 4/30/24 - S/p Doxycycline 50mg PO daily - done. Done with neomycin-polymyxin-dexamethasone ophthalmic ointment.  -Continue warm compresses    Macular drusen, right  -Not visually significant at this time. Monitor.   -OCT macula (3/5/24) - Normal thickness and contour OU. Intact EZ OU. No edema OU. Rare small   drusen peripheral macula OD. 266/259  -Not visually significant at this time. Monitor. Will consider referral to retina service in the future if any worsening of condition.     Cup to disc asymmetry  -No FH of glaucoma  -Minor cupping OS>OD  -OCT RNFL (3/5/24) - OD: WNL. OS: Thin ST, bord T. 97/84.   -Low suspicion for glaucoma at this time. Plan to recheck OCT RNFL in 1 year.     Hyperopia, bilateral  Astigmatism of both eyes, unspecified type  Presbyopia  -History of wearing daily disposable contact lens (CL) -History of monovision OD for near and OS for distance: +7.50 OD and +4.50 OS  -Tried multifocal CL in the past and did not like.   -F/u 4-6 weeks - refract both eyes and dilate right eye.       No history of refractive surgery.   No FH of  AMD/glaucoma

## 2024-06-11 ENCOUNTER — OFFICE VISIT (OUTPATIENT)
Dept: OPHTHALMOLOGY | Facility: CLINIC | Age: 74
End: 2024-06-11
Payer: MEDICARE

## 2024-06-11 DIAGNOSIS — Z96.1 PSEUDOPHAKIA: Primary | ICD-10-CM

## 2024-06-11 DIAGNOSIS — H52.03 HYPEROPIA, BILATERAL: ICD-10-CM

## 2024-06-11 DIAGNOSIS — H52.4 PRESBYOPIA: ICD-10-CM

## 2024-06-11 DIAGNOSIS — H35.361 MACULAR DRUSEN, RIGHT: ICD-10-CM

## 2024-06-11 DIAGNOSIS — H52.203 ASTIGMATISM OF BOTH EYES, UNSPECIFIED TYPE: ICD-10-CM

## 2024-06-11 PROCEDURE — 99024 POSTOP FOLLOW-UP VISIT: CPT | Performed by: OPHTHALMOLOGY

## 2024-06-11 ASSESSMENT — VISUAL ACUITY
METHOD_MR_RETINOSCOPY: 1
OD_PH_SC: 20/30-2
METHOD: SNELLEN - LINEAR
OS_SC: 20/25-2

## 2024-06-11 ASSESSMENT — REFRACTION_MANIFEST
OS_SPHERE: -0.25
OD_ADD: +2.50
OD_AXIS: 100
OS_AXIS: 072
OS_CYLINDER: -0.75
OS_AXIS: 080
OS_SPHERE: -0.25
OS_ADD: +2.50
METHOD_AUTOREFRACTION: 1
OD_AXIS: 100
OS_CYLINDER: -0.50
OD_SPHERE: -2.50
OD_CYLINDER: -0.25
OD_SPHERE: -2.50
OD_CYLINDER: -0.50

## 2024-06-11 ASSESSMENT — TONOMETRY
OS_IOP_MMHG: 13
IOP_METHOD: GOLDMANN APPLANATION
OD_IOP_MMHG: 12

## 2024-07-01 ENCOUNTER — HOSPITAL ENCOUNTER (OUTPATIENT)
Dept: RADIOLOGY | Facility: CLINIC | Age: 74
Discharge: HOME | End: 2024-07-01
Payer: MEDICARE

## 2024-07-01 DIAGNOSIS — C77.3 CARCINOMA OF LEFT BREAST METASTATIC TO AXILLARY LYMPH NODE (MULTI): ICD-10-CM

## 2024-07-01 DIAGNOSIS — Z78.0 MENOPAUSE: ICD-10-CM

## 2024-07-01 DIAGNOSIS — C50.912 CARCINOMA OF LEFT BREAST METASTATIC TO AXILLARY LYMPH NODE (MULTI): ICD-10-CM

## 2024-07-01 DIAGNOSIS — M85.80 OSTEOPENIA, UNSPECIFIED LOCATION: ICD-10-CM

## 2024-07-01 PROCEDURE — 77080 DXA BONE DENSITY AXIAL: CPT | Performed by: RADIOLOGY

## 2024-07-01 PROCEDURE — 77080 DXA BONE DENSITY AXIAL: CPT

## 2024-07-12 ENCOUNTER — APPOINTMENT (OUTPATIENT)
Dept: OPHTHALMOLOGY | Facility: CLINIC | Age: 74
End: 2024-07-12
Payer: MEDICARE

## 2024-07-16 ENCOUNTER — HOSPITAL ENCOUNTER (OUTPATIENT)
Dept: RADIOLOGY | Facility: CLINIC | Age: 74
Discharge: HOME | End: 2024-07-16
Payer: MEDICARE

## 2024-07-16 ENCOUNTER — LAB (OUTPATIENT)
Dept: LAB | Facility: LAB | Age: 74
End: 2024-07-16
Payer: MEDICARE

## 2024-07-16 DIAGNOSIS — I10 ESSENTIAL (PRIMARY) HYPERTENSION: ICD-10-CM

## 2024-07-16 DIAGNOSIS — N18.30 CHRONIC KIDNEY DISEASE, STAGE 3 UNSPECIFIED (MULTI): Primary | ICD-10-CM

## 2024-07-16 DIAGNOSIS — N18.30 CHRONIC KIDNEY DISEASE, STAGE 3 UNSPECIFIED (MULTI): ICD-10-CM

## 2024-07-16 LAB
ALBUMIN SERPL BCP-MCNC: 4.2 G/DL (ref 3.4–5)
ANION GAP SERPL CALC-SCNC: 14 MMOL/L (ref 10–20)
APPEARANCE UR: CLEAR
BILIRUB UR STRIP.AUTO-MCNC: NEGATIVE MG/DL
BUN SERPL-MCNC: 19 MG/DL (ref 6–23)
CALCIUM SERPL-MCNC: 8.9 MG/DL (ref 8.6–10.6)
CHLORIDE SERPL-SCNC: 102 MMOL/L (ref 98–107)
CO2 SERPL-SCNC: 27 MMOL/L (ref 21–32)
COLOR UR: COLORLESS
CREAT SERPL-MCNC: 1.23 MG/DL (ref 0.5–1.05)
CREAT UR-MCNC: 14.2 MG/DL (ref 20–320)
EGFRCR SERPLBLD CKD-EPI 2021: 46 ML/MIN/1.73M*2
GLUCOSE SERPL-MCNC: 81 MG/DL (ref 74–99)
GLUCOSE UR STRIP.AUTO-MCNC: NORMAL MG/DL
KETONES UR STRIP.AUTO-MCNC: NEGATIVE MG/DL
LEUKOCYTE ESTERASE UR QL STRIP.AUTO: NEGATIVE
MICROALBUMIN UR-MCNC: <7 MG/L
MICROALBUMIN/CREAT UR: ABNORMAL MG/G{CREAT}
NITRITE UR QL STRIP.AUTO: NEGATIVE
PH UR STRIP.AUTO: 7 [PH]
PHOSPHATE SERPL-MCNC: 3.4 MG/DL (ref 2.5–4.9)
POTASSIUM SERPL-SCNC: 3.9 MMOL/L (ref 3.5–5.3)
PROT UR STRIP.AUTO-MCNC: NEGATIVE MG/DL
RBC # UR STRIP.AUTO: NEGATIVE /UL
SODIUM SERPL-SCNC: 139 MMOL/L (ref 136–145)
SP GR UR STRIP.AUTO: 1
UROBILINOGEN UR STRIP.AUTO-MCNC: NORMAL MG/DL

## 2024-07-16 PROCEDURE — 82043 UR ALBUMIN QUANTITATIVE: CPT

## 2024-07-16 PROCEDURE — 82570 ASSAY OF URINE CREATININE: CPT

## 2024-07-16 PROCEDURE — 36415 COLL VENOUS BLD VENIPUNCTURE: CPT

## 2024-07-16 PROCEDURE — 81003 URINALYSIS AUTO W/O SCOPE: CPT

## 2024-07-16 PROCEDURE — 76770 US EXAM ABDO BACK WALL COMP: CPT | Performed by: RADIOLOGY

## 2024-07-16 PROCEDURE — 76770 US EXAM ABDO BACK WALL COMP: CPT

## 2024-07-16 PROCEDURE — 80069 RENAL FUNCTION PANEL: CPT

## 2024-07-19 ENCOUNTER — APPOINTMENT (OUTPATIENT)
Dept: OPHTHALMOLOGY | Facility: CLINIC | Age: 74
End: 2024-07-19
Payer: MEDICARE

## 2024-07-26 ENCOUNTER — APPOINTMENT (OUTPATIENT)
Dept: OPHTHALMOLOGY | Facility: CLINIC | Age: 74
End: 2024-07-26
Payer: MEDICARE

## 2024-07-29 ENCOUNTER — APPOINTMENT (OUTPATIENT)
Dept: DERMATOLOGY | Facility: HOSPITAL | Age: 74
End: 2024-07-29
Payer: MEDICARE

## 2024-08-01 ENCOUNTER — APPOINTMENT (OUTPATIENT)
Dept: RADIATION ONCOLOGY | Facility: HOSPITAL | Age: 74
End: 2024-08-01
Payer: MEDICARE

## 2024-08-04 ASSESSMENT — EXTERNAL EXAM - RIGHT EYE: OD_EXAM: NORMAL

## 2024-08-04 ASSESSMENT — EXTERNAL EXAM - LEFT EYE: OS_EXAM: NORMAL

## 2024-08-04 ASSESSMENT — CUP TO DISC RATIO: OD_RATIO: 0.4

## 2024-08-05 NOTE — PROGRESS NOTES
Pseudophakia of right eyeZ96.1 - 5/30/24 - Preoperative mannitol.  Pseudophakia of left eyeZ96.1 - 4/25/24 - Preoperative mannitol  -History of noticing temporal shadow OS x few days - likely negative dysphotopsia - discussed etiology and prognosis. Recommend observation and give time for neuroadaptation.   -Doing well. Good vision. IOP good. Off all gtts.   -F/u 12 months - refract OU, dilate OU for PCO check, OCT macula and OCT RNFL.     Chalazion - right upper eyelid  -Mostly resolved s/p I&C 4/30/24 - S/p Doxycycline 50mg PO daily - done. Done with neomycin-polymyxin-dexamethasone ophthalmic ointment. Small papillomatous lesion at base of lashes medial upper eyelid - would recommend observation unless change in size or appearance over time - excision of lesion would likely result in loss of lashes in that region of the eyelid.   -Continue warm compresses PRN.     Macular drusen, right  -Not visually significant at this time. Monitor.   -OCT macula (3/5/24) - Normal thickness and contour OU. Intact EZ OU. No edema OU. Rare small   drusen peripheral macula OD. 266/259  -Not visually significant at this time. Monitor. Will consider referral to retina service in the future if any worsening of condition.     Cup to disc asymmetry  -No FH of glaucoma  -Minor cupping OS>OD  -OCT RNFL (3/5/24) - OD: WNL. OS: Thin ST, bord T. 97/84.   -Low suspicion for glaucoma at this time.   -F/u 12 months - refract OU, dilate OU for PCO check, OCT macula and OCT RNFL.     Hyperopia, bilateral  Astigmatism of both eyes, unspecified type  Presbyopia  -History of wearing daily disposable contact lens (CL)   -History of monovision OD for near and OS for distance: +7.50 OD and +4.50 OS  -Tried multifocal CL in the past and did not like.   -New Rx given, may use DVO for watching TV.   -F/u 12 months - refract OU, dilate OU for PCO check, OCT macula and OCT RNFL.       No history of refractive surgery.   No FH of AMD/glaucoma

## 2024-08-16 ENCOUNTER — APPOINTMENT (OUTPATIENT)
Dept: OPHTHALMOLOGY | Facility: CLINIC | Age: 74
End: 2024-08-16
Payer: MEDICARE

## 2024-08-16 DIAGNOSIS — Z96.1 PSEUDOPHAKIA: Primary | ICD-10-CM

## 2024-08-16 DIAGNOSIS — H35.361 MACULAR DRUSEN, RIGHT: ICD-10-CM

## 2024-08-16 DIAGNOSIS — H52.4 PRESBYOPIA: ICD-10-CM

## 2024-08-16 DIAGNOSIS — H52.03 HYPEROPIA, BILATERAL: ICD-10-CM

## 2024-08-16 DIAGNOSIS — H52.203 ASTIGMATISM OF BOTH EYES, UNSPECIFIED TYPE: ICD-10-CM

## 2024-08-16 PROCEDURE — 99024 POSTOP FOLLOW-UP VISIT: CPT | Performed by: OPHTHALMOLOGY

## 2024-08-16 ASSESSMENT — ENCOUNTER SYMPTOMS
RESPIRATORY NEGATIVE: 0
NEUROLOGICAL NEGATIVE: 0
CARDIOVASCULAR NEGATIVE: 0
CONSTITUTIONAL NEGATIVE: 0
EYES NEGATIVE: 1
HEMATOLOGIC/LYMPHATIC NEGATIVE: 0
ENDOCRINE NEGATIVE: 0
PSYCHIATRIC NEGATIVE: 0
GASTROINTESTINAL NEGATIVE: 0
ALLERGIC/IMMUNOLOGIC NEGATIVE: 0
MUSCULOSKELETAL NEGATIVE: 0

## 2024-08-16 ASSESSMENT — REFRACTION_WEARINGRX
OS_SPHERE: +2.50
OD_CYLINDER: -0.75
OS_ADD: +2.50
OD_SPHERE: +1.00
OS_AXIS: 100
OS_CYLINDER: -1.50
OD_ADD: +2.50
OD_AXIS: 040

## 2024-08-16 ASSESSMENT — VISUAL ACUITY
OS_SC: 20/30
METHOD: SNELLEN - LINEAR
OD_SC: 20/100

## 2024-08-16 ASSESSMENT — TONOMETRY
OS_IOP_MMHG: 10
IOP_METHOD: GOLDMANN APPLANATION
OD_IOP_MMHG: 10

## 2024-08-16 ASSESSMENT — REFRACTION_MANIFEST
OD_SPHERE: -2.25
OD_ADD: +2.50
OD_CYLINDER: SPHERE
OS_AXIS: 070
OS_SPHERE: -0.25
OS_CYLINDER: -0.75
OS_ADD: +2.50

## 2024-08-20 ENCOUNTER — APPOINTMENT (OUTPATIENT)
Dept: RADIATION ONCOLOGY | Facility: CLINIC | Age: 74
End: 2024-08-20
Payer: MEDICARE

## 2024-08-21 ENCOUNTER — HOSPITAL ENCOUNTER (EMERGENCY)
Facility: HOSPITAL | Age: 74
Discharge: HOME | End: 2024-08-21
Attending: STUDENT IN AN ORGANIZED HEALTH CARE EDUCATION/TRAINING PROGRAM
Payer: MEDICARE

## 2024-08-21 ENCOUNTER — TELEPHONE (OUTPATIENT)
Dept: PRIMARY CARE | Facility: CLINIC | Age: 74
End: 2024-08-21
Payer: MEDICARE

## 2024-08-21 ENCOUNTER — APPOINTMENT (OUTPATIENT)
Dept: RADIOLOGY | Facility: HOSPITAL | Age: 74
End: 2024-08-21
Payer: MEDICARE

## 2024-08-21 VITALS
WEIGHT: 138 LBS | TEMPERATURE: 97.6 F | OXYGEN SATURATION: 97 % | RESPIRATION RATE: 18 BRPM | HEART RATE: 80 BPM | DIASTOLIC BLOOD PRESSURE: 86 MMHG | BODY MASS INDEX: 25.24 KG/M2 | SYSTOLIC BLOOD PRESSURE: 122 MMHG

## 2024-08-21 DIAGNOSIS — R05.1 ACUTE COUGH: ICD-10-CM

## 2024-08-21 DIAGNOSIS — Z85.3 HISTORY OF BREAST CANCER: ICD-10-CM

## 2024-08-21 DIAGNOSIS — R11.2 NAUSEA AND VOMITING, UNSPECIFIED VOMITING TYPE: Primary | ICD-10-CM

## 2024-08-21 DIAGNOSIS — J18.9 PNEUMONIA DUE TO INFECTIOUS ORGANISM, UNSPECIFIED LATERALITY, UNSPECIFIED PART OF LUNG: ICD-10-CM

## 2024-08-21 DIAGNOSIS — D72.829 LEUKOCYTOSIS, UNSPECIFIED TYPE: ICD-10-CM

## 2024-08-21 LAB
ALBUMIN SERPL BCP-MCNC: 4 G/DL (ref 3.4–5)
ALP SERPL-CCNC: 45 U/L (ref 33–136)
ALT SERPL W P-5'-P-CCNC: 15 U/L (ref 7–45)
ANION GAP SERPL CALC-SCNC: 16 MMOL/L (ref 10–20)
APPEARANCE UR: CLEAR
AST SERPL W P-5'-P-CCNC: 37 U/L (ref 9–39)
BASOPHILS # BLD AUTO: 0.07 X10*3/UL (ref 0–0.1)
BASOPHILS NFR BLD AUTO: 0.4 %
BILIRUB SERPL-MCNC: 0.4 MG/DL (ref 0–1.2)
BILIRUB UR STRIP.AUTO-MCNC: NEGATIVE MG/DL
BUN SERPL-MCNC: 17 MG/DL (ref 6–23)
CALCIUM SERPL-MCNC: 10.1 MG/DL (ref 8.6–10.3)
CHLORIDE SERPL-SCNC: 94 MMOL/L (ref 98–107)
CO2 SERPL-SCNC: 29 MMOL/L (ref 21–32)
COLOR UR: ABNORMAL
CREAT SERPL-MCNC: 1.48 MG/DL (ref 0.5–1.05)
EGFRCR SERPLBLD CKD-EPI 2021: 37 ML/MIN/1.73M*2
EOSINOPHIL # BLD AUTO: 0.03 X10*3/UL (ref 0–0.4)
EOSINOPHIL NFR BLD AUTO: 0.2 %
ERYTHROCYTE [DISTWIDTH] IN BLOOD BY AUTOMATED COUNT: 12.1 % (ref 11.5–14.5)
GLUCOSE SERPL-MCNC: 97 MG/DL (ref 74–99)
GLUCOSE UR STRIP.AUTO-MCNC: NORMAL MG/DL
HCT VFR BLD AUTO: 40.6 % (ref 36–46)
HGB BLD-MCNC: 13.1 G/DL (ref 12–16)
IMM GRANULOCYTES # BLD AUTO: 0.11 X10*3/UL (ref 0–0.5)
IMM GRANULOCYTES NFR BLD AUTO: 0.7 % (ref 0–0.9)
KETONES UR STRIP.AUTO-MCNC: ABNORMAL MG/DL
LEUKOCYTE ESTERASE UR QL STRIP.AUTO: NEGATIVE
LIPASE SERPL-CCNC: 33 U/L (ref 9–82)
LYMPHOCYTES # BLD AUTO: 2.5 X10*3/UL (ref 0.8–3)
LYMPHOCYTES NFR BLD AUTO: 15.9 %
MCH RBC QN AUTO: 29.3 PG (ref 26–34)
MCHC RBC AUTO-ENTMCNC: 32.3 G/DL (ref 32–36)
MCV RBC AUTO: 91 FL (ref 80–100)
MONOCYTES # BLD AUTO: 0.81 X10*3/UL (ref 0.05–0.8)
MONOCYTES NFR BLD AUTO: 5.1 %
NEUTROPHILS # BLD AUTO: 12.23 X10*3/UL (ref 1.6–5.5)
NEUTROPHILS NFR BLD AUTO: 77.7 %
NITRITE UR QL STRIP.AUTO: NEGATIVE
NRBC BLD-RTO: 0 /100 WBCS (ref 0–0)
PH UR STRIP.AUTO: 7.5 [PH]
PLATELET # BLD AUTO: 366 X10*3/UL (ref 150–450)
POTASSIUM SERPL-SCNC: 4.4 MMOL/L (ref 3.5–5.3)
PROT SERPL-MCNC: 8.7 G/DL (ref 6.4–8.2)
PROT UR STRIP.AUTO-MCNC: NEGATIVE MG/DL
RBC # BLD AUTO: 4.47 X10*6/UL (ref 4–5.2)
RBC # UR STRIP.AUTO: NEGATIVE /UL
SARS-COV-2 RNA RESP QL NAA+PROBE: NOT DETECTED
SODIUM SERPL-SCNC: 135 MMOL/L (ref 136–145)
SP GR UR STRIP.AUTO: 1.03
UROBILINOGEN UR STRIP.AUTO-MCNC: NORMAL MG/DL
WBC # BLD AUTO: 15.8 X10*3/UL (ref 4.4–11.3)

## 2024-08-21 PROCEDURE — 83690 ASSAY OF LIPASE: CPT | Performed by: PHYSICIAN ASSISTANT

## 2024-08-21 PROCEDURE — 96374 THER/PROPH/DIAG INJ IV PUSH: CPT

## 2024-08-21 PROCEDURE — 80053 COMPREHEN METABOLIC PANEL: CPT | Performed by: PHYSICIAN ASSISTANT

## 2024-08-21 PROCEDURE — 87635 SARS-COV-2 COVID-19 AMP PRB: CPT | Performed by: STUDENT IN AN ORGANIZED HEALTH CARE EDUCATION/TRAINING PROGRAM

## 2024-08-21 PROCEDURE — 36415 COLL VENOUS BLD VENIPUNCTURE: CPT | Performed by: PHYSICIAN ASSISTANT

## 2024-08-21 PROCEDURE — 87635 SARS-COV-2 COVID-19 AMP PRB: CPT | Performed by: PHYSICIAN ASSISTANT

## 2024-08-21 PROCEDURE — 81003 URINALYSIS AUTO W/O SCOPE: CPT | Performed by: PHYSICIAN ASSISTANT

## 2024-08-21 PROCEDURE — 71046 X-RAY EXAM CHEST 2 VIEWS: CPT

## 2024-08-21 PROCEDURE — 2500000004 HC RX 250 GENERAL PHARMACY W/ HCPCS (ALT 636 FOR OP/ED): Performed by: PHYSICIAN ASSISTANT

## 2024-08-21 PROCEDURE — 71046 X-RAY EXAM CHEST 2 VIEWS: CPT | Mod: FOREIGN READ | Performed by: RADIOLOGY

## 2024-08-21 PROCEDURE — 71275 CT ANGIOGRAPHY CHEST: CPT | Mod: FOREIGN READ | Performed by: RADIOLOGY

## 2024-08-21 PROCEDURE — 2550000001 HC RX 255 CONTRASTS: Performed by: STUDENT IN AN ORGANIZED HEALTH CARE EDUCATION/TRAINING PROGRAM

## 2024-08-21 PROCEDURE — 71275 CT ANGIOGRAPHY CHEST: CPT

## 2024-08-21 PROCEDURE — 99284 EMERGENCY DEPT VISIT MOD MDM: CPT | Mod: 25

## 2024-08-21 PROCEDURE — 96361 HYDRATE IV INFUSION ADD-ON: CPT

## 2024-08-21 PROCEDURE — 2500000001 HC RX 250 WO HCPCS SELF ADMINISTERED DRUGS (ALT 637 FOR MEDICARE OP): Performed by: STUDENT IN AN ORGANIZED HEALTH CARE EDUCATION/TRAINING PROGRAM

## 2024-08-21 PROCEDURE — 85025 COMPLETE CBC W/AUTO DIFF WBC: CPT | Performed by: PHYSICIAN ASSISTANT

## 2024-08-21 RX ORDER — DOXYCYCLINE HYCLATE 100 MG
100 TABLET ORAL ONCE
Status: COMPLETED | OUTPATIENT
Start: 2024-08-21 | End: 2024-08-21

## 2024-08-21 RX ORDER — BENZONATATE 100 MG/1
100 CAPSULE ORAL ONCE
Status: COMPLETED | OUTPATIENT
Start: 2024-08-21 | End: 2024-08-21

## 2024-08-21 RX ORDER — BENZONATATE 100 MG/1
100 CAPSULE ORAL EVERY 8 HOURS
Qty: 21 CAPSULE | Refills: 0 | Status: SHIPPED | OUTPATIENT
Start: 2024-08-21 | End: 2024-08-28

## 2024-08-21 RX ORDER — ONDANSETRON HYDROCHLORIDE 2 MG/ML
4 INJECTION, SOLUTION INTRAVENOUS ONCE
Status: COMPLETED | OUTPATIENT
Start: 2024-08-21 | End: 2024-08-21

## 2024-08-21 RX ORDER — DOXYCYCLINE 100 MG/1
100 CAPSULE ORAL 2 TIMES DAILY
Qty: 14 CAPSULE | Refills: 0 | Status: SHIPPED | OUTPATIENT
Start: 2024-08-21 | End: 2024-08-28

## 2024-08-21 RX ORDER — ONDANSETRON 4 MG/1
4 TABLET, FILM COATED ORAL EVERY 6 HOURS
Qty: 12 TABLET | Refills: 0 | Status: SHIPPED | OUTPATIENT
Start: 2024-08-21 | End: 2024-08-24

## 2024-08-21 ASSESSMENT — COLUMBIA-SUICIDE SEVERITY RATING SCALE - C-SSRS
1. IN THE PAST MONTH, HAVE YOU WISHED YOU WERE DEAD OR WISHED YOU COULD GO TO SLEEP AND NOT WAKE UP?: NO
6. HAVE YOU EVER DONE ANYTHING, STARTED TO DO ANYTHING, OR PREPARED TO DO ANYTHING TO END YOUR LIFE?: NO
2. HAVE YOU ACTUALLY HAD ANY THOUGHTS OF KILLING YOURSELF?: NO

## 2024-08-21 ASSESSMENT — PAIN SCALES - GENERAL
PAINLEVEL_OUTOF10: 0 - NO PAIN
PAINLEVEL_OUTOF10: 0 - NO PAIN

## 2024-08-21 ASSESSMENT — PAIN - FUNCTIONAL ASSESSMENT: PAIN_FUNCTIONAL_ASSESSMENT: 0-10

## 2024-08-21 NOTE — TELEPHONE ENCOUNTER
Pt is calling in regards to Annabel, Pt has been throwing up for 2-3 days now and is very weak,  is taking her to ER for fluids due to possible dehydration - SUHA

## 2024-08-21 NOTE — ED PROVIDER NOTES
HPI   Chief Complaint   Patient presents with   • Vomiting       Pleasant 74-year-old female here with her  complains of cough that has been ongoing for a week and nausea and vomiting.  She actually was so weak the other day the  called EMS to help her up however she refused hospital care at this time their daughter who is a physician recommended she get IV fluids and nausea medication.              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    Malignant neoplasm of uterine fundus   • Other abnormal and inconclusive findings on diagnostic imaging of breast 12/19/2019    Abnormal MRI, breast   • Personal history of malignant neoplasm of thyroid 12/19/2019    History of malignant neoplasm of thyroid   • Personal history of other malignant neoplasm of skin     History of other malignant neoplasm of skin   • Personal history of other medical treatment 09/08/2017    History of screening mammography   • Personal history of other specified conditions 12/06/2019    History of abnormal mammogram   • Pneumonitis due to inhalation of food and vomit (Multi) 10/06/2016    Aspiration pneumonia of both upper lobes due to gastric secretions   • Vaginal atrophy 04/04/2023     Past Surgical History:   Procedure Laterality Date   • CATARACT EXTRACTION Left 04/25/2024    Dr. Banda   • CATARACT EXTRACTION W/  INTRAOCULAR LENS IMPLANT Right 05/30/2024    Dr. Banda Aim: -2.00 to -2.50   •  HYSTERECTOMY  08/03/2017    Hysterectomy   • OTHER SURGICAL HISTORY  06/25/2020    Mastectomy partial   • OTHER SURGICAL HISTORY  06/25/2020    [place] lymph node biopsy procedure   • OTHER SURGICAL HISTORY  07/06/2020    Axillary lymphadenectomy   • THYROID SURGERY  01/15/2014    Thyroid Surgery     Family History   Problem Relation Name Age of Onset   • Heart attack Mother     • Heart attack Father     • Other (cardiac arrhythmia) Sister       Social History     Tobacco Use   • Smoking status: Never   • Smokeless tobacco: Never   • Tobacco comments:     No history of anesthesia reactions. No family history of MH.     No illnesses in the past 30 days.     Does get mildly SOB flight of stairs, so she goes slowly.     Ambulates freely     Is able to lie flat for 30 minutes.  Updated on 5/22/24  No change   Vaping Use   • Vaping status: Never Used   Substance Use Topics   • Alcohol use: Yes     Comment: rare   • Drug use: Never       Physical Exam   ED Triage Vitals [08/21/24 1434]   Temperature Heart Rate Respirations BP   36.4 °C (97.6 °F) 89 18 137/80      Pulse Ox Temp Source Heart Rate Source Patient Position   96 % Temporal Monitor --      BP Location FiO2 (%)     -- --       Physical Exam  Vitals reviewed.   Constitutional:       General: She is not in acute distress.     Appearance: Normal appearance. She is normal weight. She is not ill-appearing, toxic-appearing or diaphoretic.   HENT:      Head: Normocephalic and atraumatic.      Right Ear: External ear normal.      Left Ear: External ear normal.      Nose: Nose normal. No congestion or rhinorrhea.      Mouth/Throat:      Mouth: Mucous membranes are moist.      Pharynx: No oropharyngeal exudate.   Eyes:      Extraocular Movements: Extraocular movements intact.      Conjunctiva/sclera: Conjunctivae normal.      Pupils: Pupils are equal, round, and reactive to light.   Cardiovascular:      Rate and Rhythm: Normal rate and regular rhythm.      Heart sounds:  No murmur heard.  Pulmonary:      Effort: Pulmonary effort is normal. No respiratory distress.      Breath sounds: No stridor.   Abdominal:      General: There is no distension.   Musculoskeletal:         General: No swelling, tenderness, deformity or signs of injury.      Cervical back: Normal range of motion.   Skin:     Capillary Refill: Capillary refill takes less than 2 seconds.      Findings: No rash.   Neurological:      General: No focal deficit present.      Mental Status: She is alert and oriented to person, place, and time. Mental status is at baseline.      Cranial Nerves: No cranial nerve deficit.      Sensory: No sensory deficit.      Motor: No weakness.      Coordination: Coordination normal.   Psychiatric:         Mood and Affect: Mood normal.         Behavior: Behavior normal.         Thought Content: Thought content normal.         Judgment: Judgment normal.           ED Course & MDM   Diagnoses as of 08/21/24 1848   Nausea and vomiting, unspecified vomiting type   Acute cough   Leukocytosis, unspecified type   History of breast cancer                 No data recorded                                 Medical Decision Making  Differential diagnosis is pneumonia, viral syndrome,    Staffed with ED attending.  Pending disposition    Amount and/or Complexity of Data Reviewed  Labs: ordered.     Details: Leukocytosis        Procedure  Procedures     Ruben Pastrana PA-C  08/21/24 1848

## 2024-08-21 NOTE — PROGRESS NOTES
Patient is able to receive IV contrast given her EGFR as she was rehydrated with IV normal saline.

## 2024-08-22 NOTE — DISCHARGE INSTRUCTIONS
You have been seen at a [facility].  Please follow-up with your primary care provider in the next 1 to 2 days for further evaluation and routine follow-up.  Please return to the emergency room if having any worsening symptoms.  Please follow-up with any specialists if discussed during your emergency room stay.

## 2024-08-27 NOTE — PROGRESS NOTES
Subjective   Patient ID: Annabel Cottrell is a 74 y.o. female.    HPI  Patient presents today in follow-up of recent emergency room visit  8/21/2024  3 days prior to this had   She had presented there with a few days of vomiting nauseousness and did have some cough  This nausea is always preceded by vertigo terrible vertigo and then has intractable vomiting almost projectile vomiting.  It actually lasted for about 3 days prior to going to the ER at this time  She ended up with a CT scan to rule out pulmonary embolism which there was no pulmonary embolism but question of pneumonia was raised and was started on doxycycline she had been offered admission at that time but she had refused  She did have leukocytosis as well    In retrospect she had a very similar episode to this Thanksgiving 2023 but this episode lasted a total of about 3 hours she had been having some more abdominal issue prior to that and had also been taking Ozempic.  She had stopped the Ozempic but interestingly she had taken 1 dose of Ozempic prior to this last episode.      Her daughter Sheryl who is a physician had messaged me regarding concerns with kidney function making sure improved after hydration  Was feeling somewhat better than had a sudden move while she was in the shower vertigo returned nauseated once more had to use Phenergan  Also developed a rash on her cheeks only very red almost like slapped cheek but no other symptoms that would be consistent with parvovirus but concerns about parvovirus    She feels like this irritating cough she has has been present since mid July when she was exposed to her grandson who had similar symptoms though his cough improved.  She has had somewhat of an irritating cough like this in the fall time before that we eventually thought perhaps it was allergies  She has been feeling lightheaded she did not take her blood pressure medications this morning  She just is not feeling great        She has a rather  extensive past medical history    Hypertension  Hypercholesteremia  Chronic kidney disease  Thyroid cancer and hypothyroidism   History of remote endometrial cancer  Anxiety and depression   Breast cancer        Review of Systems    Objective   Physical Exam  Well-developed no acute distress  She does seem somewhat pale  HEENT exam the sclera are nonicteric and the conjunctiva are pink  Lungs are clear but she has coughing when trying to take any kind of breath at all just dry irritating cough  Cardiovascular regular rate and rhythm  Peripheries without edema  There is no rash present    Lab Results   Component Value Date    WBC 15.8 (H) 08/21/2024    HGB 13.1 08/21/2024    HCT 40.6 08/21/2024    MCV 91 08/21/2024     08/21/2024      Lab Results   Component Value Date    GLUCOSE 97 08/21/2024    CALCIUM 10.1 08/21/2024     (L) 08/21/2024    K 4.4 08/21/2024    CO2 29 08/21/2024    CL 94 (L) 08/21/2024    BUN 17 08/21/2024    CREATININE 1.48 (H) 08/21/2024    === 08/21/24 ===    CT ANGIO CHEST FOR PULMONARY EMBOLISM    - Impression -  1. No pulmonary emboli.  2. There are small patchy infiltrates in bilateral upper and lower  lobes which may represent pneumonia.  3. Mild bronchiectasis.  Signed by Marcos Perez MD   Assessment/Plan   #1 episodic vertigo with nausea and projectile vomiting episode earlier this month lasted 3 days she has had another episode since but it was brief in nature she had had 1 similar episode to this in November she had gone to vestibular therapy at that time not sure if it helped or not as she really did not complete the whole therapy etiology remains unclear  We have opted to do a CT brain looking for any pathology there wonder if this could be intermittent severe reflux though doubt as the vertigo seems to happen first I have switched her omeprazole to pantoprazole if the vomiting persists I would have her see gastroenterology  2.  Hypertension blood pressure is actually  quite low today she will continue to hold her valsartan if her systolic blood pressures greater than 120 she will restart  3.  Cough continues with this irritating dry cough question of infiltrates raised on CT scan she completed the doxycycline has had some issues with allergies in the past we will try Allegra and Astepro but also will switch to pantoprazole in case some of this is from reflux if continued issues consider high-resolution CT chest x-ray would not be helpful as infiltrate did not show on the chest x-ray  4.  Leukocytosis we will recheck CBC  5.  Chronic kidney disease check serum electrolytes.  45 minutes were spent with patient of which greater than 50% was spent in counseling and coordination of care  This note was partially generated using the Dragon voice recognition system.  There may be some incorrect wording ,grammar, spelling or punctuation errors that were not corrected prior to committing the note to the medical record.

## 2024-08-28 ENCOUNTER — PATIENT MESSAGE (OUTPATIENT)
Dept: PRIMARY CARE | Facility: CLINIC | Age: 74
End: 2024-08-28
Payer: MEDICARE

## 2024-08-30 ENCOUNTER — OFFICE VISIT (OUTPATIENT)
Dept: PRIMARY CARE | Facility: CLINIC | Age: 74
End: 2024-08-30
Payer: MEDICARE

## 2024-08-30 ENCOUNTER — LAB (OUTPATIENT)
Dept: LAB | Facility: LAB | Age: 74
End: 2024-08-30
Payer: MEDICARE

## 2024-08-30 VITALS
DIASTOLIC BLOOD PRESSURE: 62 MMHG | TEMPERATURE: 98.4 F | SYSTOLIC BLOOD PRESSURE: 100 MMHG | HEART RATE: 89 BPM | OXYGEN SATURATION: 97 %

## 2024-08-30 DIAGNOSIS — R42 DIZZINESS: ICD-10-CM

## 2024-08-30 DIAGNOSIS — N18.31 STAGE 3A CHRONIC KIDNEY DISEASE (MULTI): ICD-10-CM

## 2024-08-30 DIAGNOSIS — I10 BENIGN ESSENTIAL HYPERTENSION: Primary | ICD-10-CM

## 2024-08-30 DIAGNOSIS — R21 MALAR RASH: ICD-10-CM

## 2024-08-30 DIAGNOSIS — K21.9 GASTROESOPHAGEAL REFLUX DISEASE WITHOUT ESOPHAGITIS: ICD-10-CM

## 2024-08-30 DIAGNOSIS — G44.83 PRIMARY COUGH HEADACHE: ICD-10-CM

## 2024-08-30 LAB
ALBUMIN SERPL BCP-MCNC: 4.1 G/DL (ref 3.4–5)
ALP SERPL-CCNC: 37 U/L (ref 33–136)
ALT SERPL W P-5'-P-CCNC: 12 U/L (ref 7–45)
ANION GAP SERPL CALC-SCNC: 15 MMOL/L (ref 10–20)
AST SERPL W P-5'-P-CCNC: 18 U/L (ref 9–39)
BILIRUB SERPL-MCNC: 0.4 MG/DL (ref 0–1.2)
BUN SERPL-MCNC: 22 MG/DL (ref 6–23)
CALCIUM SERPL-MCNC: 9.6 MG/DL (ref 8.6–10.3)
CHLORIDE SERPL-SCNC: 101 MMOL/L (ref 98–107)
CO2 SERPL-SCNC: 27 MMOL/L (ref 21–32)
CREAT SERPL-MCNC: 1.44 MG/DL (ref 0.5–1.05)
EGFRCR SERPLBLD CKD-EPI 2021: 38 ML/MIN/1.73M*2
ERYTHROCYTE [DISTWIDTH] IN BLOOD BY AUTOMATED COUNT: 12.7 % (ref 11.5–14.5)
GLUCOSE SERPL-MCNC: 99 MG/DL (ref 74–99)
HCT VFR BLD AUTO: 40.5 % (ref 36–46)
HGB BLD-MCNC: 13.2 G/DL (ref 12–16)
MCH RBC QN AUTO: 29.5 PG (ref 26–34)
MCHC RBC AUTO-ENTMCNC: 32.6 G/DL (ref 32–36)
MCV RBC AUTO: 91 FL (ref 80–100)
NRBC BLD-RTO: 0 /100 WBCS (ref 0–0)
PLATELET # BLD AUTO: 439 X10*3/UL (ref 150–450)
POTASSIUM SERPL-SCNC: 4.2 MMOL/L (ref 3.5–5.3)
PROT SERPL-MCNC: 6.9 G/DL (ref 6.4–8.2)
RBC # BLD AUTO: 4.47 X10*6/UL (ref 4–5.2)
SODIUM SERPL-SCNC: 139 MMOL/L (ref 136–145)
WBC # BLD AUTO: 9 X10*3/UL (ref 4.4–11.3)

## 2024-08-30 PROCEDURE — 1159F MED LIST DOCD IN RCRD: CPT | Performed by: INTERNAL MEDICINE

## 2024-08-30 PROCEDURE — 86038 ANTINUCLEAR ANTIBODIES: CPT

## 2024-08-30 PROCEDURE — 80053 COMPREHEN METABOLIC PANEL: CPT

## 2024-08-30 PROCEDURE — 3078F DIAST BP <80 MM HG: CPT | Performed by: INTERNAL MEDICINE

## 2024-08-30 PROCEDURE — 1036F TOBACCO NON-USER: CPT | Performed by: INTERNAL MEDICINE

## 2024-08-30 PROCEDURE — 3074F SYST BP LT 130 MM HG: CPT | Performed by: INTERNAL MEDICINE

## 2024-08-30 PROCEDURE — 85027 COMPLETE CBC AUTOMATED: CPT

## 2024-08-30 PROCEDURE — 99214 OFFICE O/P EST MOD 30 MIN: CPT | Performed by: INTERNAL MEDICINE

## 2024-08-30 PROCEDURE — 1160F RVW MEDS BY RX/DR IN RCRD: CPT | Performed by: INTERNAL MEDICINE

## 2024-08-30 RX ORDER — VALSARTAN AND HYDROCHLOROTHIAZIDE 160; 12.5 MG/1; MG/1
1 TABLET, FILM COATED ORAL DAILY
COMMUNITY

## 2024-08-30 RX ORDER — PANTOPRAZOLE SODIUM 40 MG/1
40 TABLET, DELAYED RELEASE ORAL DAILY
Qty: 30 TABLET | Refills: 1 | Status: SHIPPED | OUTPATIENT
Start: 2024-08-30 | End: 2024-10-29

## 2024-08-30 NOTE — PATIENT INSTRUCTIONS
Unfortunately I think many of your symptoms remain somewhat mysterious  I do not want you to take any of your blood pressure medications right now your blood pressure is quite low.  Please monitor your blood pressure and if your systolic number is greater then 120 restart your blood pressure medications  We are going to check blood work we will recheck kidney functions blood counts with some additional blood work as well we will check a parvovirus titer and an AMBEL titer as well  Because of the symptoms of the dizziness and associated nausea as well we will obtain a CT scan of your brain without contrast  I want to switch your omeprazole to pantoprazole in case reflux is adding into this  If the cough persists I will want to do a high-resolution CT of your chest.

## 2024-09-02 DIAGNOSIS — N18.31 STAGE 3A CHRONIC KIDNEY DISEASE (MULTI): Primary | ICD-10-CM

## 2024-09-03 DIAGNOSIS — R42 VERTIGO: Primary | ICD-10-CM

## 2024-09-03 DIAGNOSIS — Z85.3 PERSONAL HISTORY OF BREAST CANCER: ICD-10-CM

## 2024-09-03 DIAGNOSIS — R30.0 DYSURIA: Primary | ICD-10-CM

## 2024-09-03 LAB
ANA SER QL HEP2 SUBST: NEGATIVE
B19V IGG SER IA-ACNC: 1.74 IV
B19V IGM SER IA-ACNC: 0.08 IV

## 2024-09-04 ENCOUNTER — LAB (OUTPATIENT)
Dept: LAB | Facility: LAB | Age: 74
End: 2024-09-04
Payer: MEDICARE

## 2024-09-04 ENCOUNTER — HOSPITAL ENCOUNTER (OUTPATIENT)
Dept: RADIOLOGY | Facility: CLINIC | Age: 74
Discharge: HOME | End: 2024-09-04
Payer: MEDICARE

## 2024-09-04 DIAGNOSIS — R42 VERTIGO: ICD-10-CM

## 2024-09-04 DIAGNOSIS — Z85.3 PERSONAL HISTORY OF BREAST CANCER: ICD-10-CM

## 2024-09-04 DIAGNOSIS — R42 VERTIGO: Primary | ICD-10-CM

## 2024-09-04 DIAGNOSIS — N18.31 STAGE 3A CHRONIC KIDNEY DISEASE (MULTI): ICD-10-CM

## 2024-09-04 DIAGNOSIS — R30.0 DYSURIA: ICD-10-CM

## 2024-09-04 LAB
ANION GAP SERPL CALC-SCNC: 9 MMOL/L (ref 10–20)
APPEARANCE UR: ABNORMAL
BACTERIA #/AREA URNS AUTO: ABNORMAL /HPF
BILIRUB UR STRIP.AUTO-MCNC: NEGATIVE MG/DL
BUN SERPL-MCNC: 14 MG/DL (ref 6–23)
CALCIUM SERPL-MCNC: 9.6 MG/DL (ref 8.6–10.6)
CHLORIDE SERPL-SCNC: 105 MMOL/L (ref 98–107)
CO2 SERPL-SCNC: 33 MMOL/L (ref 21–32)
COLOR UR: YELLOW
CREAT SERPL-MCNC: 1.24 MG/DL (ref 0.5–1.05)
EGFRCR SERPLBLD CKD-EPI 2021: 46 ML/MIN/1.73M*2
GLUCOSE SERPL-MCNC: 97 MG/DL (ref 74–99)
GLUCOSE UR STRIP.AUTO-MCNC: NORMAL MG/DL
HOLD SPECIMEN: NORMAL
HYALINE CASTS #/AREA URNS AUTO: ABNORMAL /LPF
KETONES UR STRIP.AUTO-MCNC: ABNORMAL MG/DL
LEUKOCYTE ESTERASE UR QL STRIP.AUTO: NEGATIVE
MUCOUS THREADS #/AREA URNS AUTO: ABNORMAL /LPF
NITRITE UR QL STRIP.AUTO: NEGATIVE
PH UR STRIP.AUTO: 6 [PH]
POTASSIUM SERPL-SCNC: 3.8 MMOL/L (ref 3.5–5.3)
PROT UR STRIP.AUTO-MCNC: ABNORMAL MG/DL
RBC # UR STRIP.AUTO: NEGATIVE /UL
RBC #/AREA URNS AUTO: ABNORMAL /HPF
SODIUM SERPL-SCNC: 143 MMOL/L (ref 136–145)
SP GR UR STRIP.AUTO: 1.02
SQUAMOUS #/AREA URNS AUTO: ABNORMAL /HPF
UROBILINOGEN UR STRIP.AUTO-MCNC: NORMAL MG/DL
WBC #/AREA URNS AUTO: ABNORMAL /HPF

## 2024-09-04 PROCEDURE — 70450 CT HEAD/BRAIN W/O DYE: CPT

## 2024-09-04 PROCEDURE — 81001 URINALYSIS AUTO W/SCOPE: CPT

## 2024-09-04 PROCEDURE — 70450 CT HEAD/BRAIN W/O DYE: CPT | Performed by: RADIOLOGY

## 2024-09-04 PROCEDURE — 80048 BASIC METABOLIC PNL TOTAL CA: CPT

## 2024-09-04 PROCEDURE — 36415 COLL VENOUS BLD VENIPUNCTURE: CPT

## 2024-09-05 ENCOUNTER — APPOINTMENT (OUTPATIENT)
Dept: RADIATION ONCOLOGY | Facility: HOSPITAL | Age: 74
End: 2024-09-05
Payer: MEDICARE

## 2024-09-06 ENCOUNTER — TELEPHONE (OUTPATIENT)
Dept: PRIMARY CARE | Facility: CLINIC | Age: 74
End: 2024-09-06
Payer: MEDICARE

## 2024-09-06 NOTE — TELEPHONE ENCOUNTER
Pt is calling to let you know she does not want to do physical therapy for her vertigo she has done it in the past and it has not helped - SUHA

## 2024-09-11 ENCOUNTER — HOSPITAL ENCOUNTER (OUTPATIENT)
Dept: RADIATION ONCOLOGY | Facility: HOSPITAL | Age: 74
Setting detail: RADIATION/ONCOLOGY SERIES
Discharge: HOME | End: 2024-09-11
Payer: MEDICARE

## 2024-09-11 VITALS
WEIGHT: 139 LBS | HEART RATE: 107 BPM | OXYGEN SATURATION: 98 % | DIASTOLIC BLOOD PRESSURE: 98 MMHG | SYSTOLIC BLOOD PRESSURE: 161 MMHG | RESPIRATION RATE: 18 BRPM | BODY MASS INDEX: 25.42 KG/M2 | TEMPERATURE: 97.7 F

## 2024-09-11 DIAGNOSIS — C50.412 MALIGNANT NEOPLASM OF UPPER-OUTER QUADRANT OF LEFT BREAST IN FEMALE, ESTROGEN RECEPTOR POSITIVE (MULTI): ICD-10-CM

## 2024-09-11 DIAGNOSIS — Z17.0 MALIGNANT NEOPLASM OF UPPER-OUTER QUADRANT OF LEFT BREAST IN FEMALE, ESTROGEN RECEPTOR POSITIVE (MULTI): ICD-10-CM

## 2024-09-11 DIAGNOSIS — Z79.810 ENCOUNTER FOR MONITORING TAMOXIFEN THERAPY: Primary | ICD-10-CM

## 2024-09-11 DIAGNOSIS — Z51.81 ENCOUNTER FOR MONITORING TAMOXIFEN THERAPY: Primary | ICD-10-CM

## 2024-09-11 PROCEDURE — 99213 OFFICE O/P EST LOW 20 MIN: CPT | Performed by: NURSE PRACTITIONER

## 2024-09-11 ASSESSMENT — PATIENT HEALTH QUESTIONNAIRE - PHQ9
1. LITTLE INTEREST OR PLEASURE IN DOING THINGS: NOT AT ALL
SUM OF ALL RESPONSES TO PHQ9 QUESTIONS 1 AND 2: 0
2. FEELING DOWN, DEPRESSED OR HOPELESS: NOT AT ALL

## 2024-09-11 ASSESSMENT — ENCOUNTER SYMPTOMS
DEPRESSION: 0
OCCASIONAL FEELINGS OF UNSTEADINESS: 0
LOSS OF SENSATION IN FEET: 0

## 2024-09-11 ASSESSMENT — PAIN SCALES - GENERAL: PAINLEVEL: 0-NO PAIN

## 2024-09-11 NOTE — PROGRESS NOTES
Radiation Oncology Follow-Up    Patient Name:  Annabel Cottrell  MRN:  [id number]  :  1950    Referring Provider: No ref. provider found  Primary Care Provider: Kenzie Veliz MD  Care Team: Patient Care Team:  Kenzie Veliz MD as PCP - General (Internal Medicine)  Aime Chaudhry MD as Consulting Physician (Hematology and Oncology)  Marcos Coulter MD as Consulting Physician (Cardiology)    Date of Service: 2024     Cancer Staging:          Breast         AJCC Edition: 8th (AJCC), Diagnosis Date: Dec 2019, Stage(no match), ypT2 ypN3 cM0  G2     Treatment Synopsis:    74 year-old female with a clinical T2 N1 M0, invasive lobular carcinoma grade 2, LCIS, ER >95%, TN >95%, HER2 negative stage IIB,  the left breast status post neoadjuvant  anastrozole initiated 20.  She did not tolerate this (edema) and was switched to exemestane 20. On 3/16 she stopped exemestane for side effects. She took two doses of anastrozole  and  then stopped for edema. Letrozole initiated 20.  MammaPrint on core was Low Risk Luminal A.  - Progression on breast imaging 20. Staging was negative other than a single sclerotic rib lesion. Left partial mastectomy and SLN on 20 showed 3 cm residual tumor with multiple close and positive margins, 4/4 positive SLN. Right excision of  area of intraductal papilloma showed no additional findings. Reexcision left breast and ALND 20 showed LCIS in the breast and additional 4/4 positive ALN for total of 8/8 positive LNs.  - Postop chemotherapy with Docetaxel/Cytoxan x 4-6 cycles and radiation planned. First Docetaxel/Cytoxan 20. Stopped after 1st cycle because ofhospital admission for sepsis/febrile neutropenia despite pegfilgrastim. Repeat PET/CT negative 2020.  The lymph nodes appeared to be progressing and she underwent a left partial mastectomy with sentinel lymph node biopsy and completion axillary lymph node dissection. There were 8 of 8  positive lymph nodes. She had a palpable supraclavicular lymph node  which could not be biopsied.  - 9/21/2020 - 11/6/2020: Treated with radiation consisting of a total dose of 50Gy (2 Gy per fx) to the left breast, supraclavicular fossa, axilla, and internal mammary idris chain with boost of additional 10 Gy (for total of 62 Gy) to the palpable SC  lymph node and the tumor bed received an additional 12 gy (total of 62 Gy).   - Continued on letrozole endocrine therapy post treatment.   - Changed to tamoxifen July, 2021 due to intolerable side effects  - Adjuvant Zometa initiated 1/2021    SUBJECTIVE  History of Present Illness:   Annabel Cottrell is here today for routine radiation follow up/surveillance visit. She has been having intermittent nausea and sometime vomiting with vertigo past month. She also reports hearing loss in left ear for some time. She had CT of head and chest, both negative.  She says she also had questionable pneumonia. She denies any palpable findings in either breast; no lymphedema of left arm or difficulty with ROM. Says her energy is baseline and she is managing all of her ADLs. No headaches, fever, chills, cough, SOB, chest pain, GI complaints or bony pain. She has had hearing loss in her left ear. Mammogram in May without evidence of malignancy.      Review of Systems:    Review of Systems   All other systems reviewed and are negative.    Performance Status:   The Karnofsky performance scale today is 90, Able to carry on normal activity; minor signs or symptoms of disease (ECOG equivalent 0).      OBJECTIVE    Current Outpatient Medications:     calcium carbonate 600 mg calcium (1,500 mg) tablet, Take 2 tablets (3,000 mg) by mouth once daily., Disp: , Rfl:     cholecalciferol (Vitamin D-3) 125 MCG (5000 UT) capsule, Take 1 capsule (125 mcg) by mouth once daily., Disp: , Rfl:     estrogens, conjugated, (Premarin) vaginal cream, Apply pea-sized amount to the vaginal opening 3 times weekly as  directed, Disp: 30 g, Rfl: 3    levothyroxine (Synthroid, Levoxyl) 112 mcg tablet, Take 1 tablet (112 mcg) by mouth once daily. as directed, Disp: 90 tablet, Rfl: 3    magnesium gluconate (Magonate) 27.5 mg magne- sium (500 mg) tablet, Take 1 tablet (27.5 mg) by mouth once daily., Disp: , Rfl:     pantoprazole (ProtoNix) 40 mg EC tablet, Take 1 tablet (40 mg) by mouth once daily. Do not crush, chew, or split., Disp: 30 tablet, Rfl: 1    rosuvastatin (Crestor) 40 mg tablet, Take 1 tablet (40 mg) by mouth once daily at bedtime., Disp: 90 tablet, Rfl: 3    tamoxifen (Nolvadex) 20 mg tablet, Take 1 tablet (20 mg total) by mouth once daily., Disp: 90 tablet, Rfl: 3    valsartan-hydrochlorothiazide (Diovan-HCT) 160-12.5 mg tablet, Take 1 tablet by mouth once daily., Disp: , Rfl:     venlafaxine XR (Effexor-XR) 75 mg 24 hr capsule, Take 1 capsule (75 mg) by mouth once daily., Disp: 90 capsule, Rfl: 3    zinc gluconate 50 mg tablet, Take 1 tablet (50 mg) by mouth once daily., Disp: , Rfl:      Physical Exam  Vitals reviewed.   Constitutional:       Appearance: Normal appearance.   HENT:      Head: Normocephalic and atraumatic.      Nose: Nose normal.      Mouth/Throat:      Mouth: Mucous membranes are moist.      Pharynx: Oropharynx is clear.   Eyes:      Conjunctiva/sclera: Conjunctivae normal.      Pupils: Pupils are equal, round, and reactive to light.   Cardiovascular:      Rate and Rhythm: Normal rate and regular rhythm.      Heart sounds: Normal heart sounds.   Pulmonary:      Effort: Pulmonary effort is normal.      Breath sounds: Normal breath sounds.   Chest:   Breasts:     Right: No swelling, inverted nipple, mass, nipple discharge or skin change.      Left: No swelling, inverted nipple, mass, nipple discharge or skin change.   Abdominal:      Palpations: Abdomen is soft.   Musculoskeletal:         General: No swelling. Normal range of motion.      Cervical back: Normal range of motion and neck supple.    Lymphadenopathy:      Cervical: No cervical adenopathy.   Skin:     General: Skin is warm and dry.   Neurological:      General: No focal deficit present.      Mental Status: She is alert and oriented to person, place, and time.   Psychiatric:         Mood and Affect: Mood normal.         Behavior: Behavior normal.         RESULTS:    Narrative & Impression   Interpreted By:  Elke Solano and Hanreck James   STUDY:  BI MAMMO BILATERAL SCREENING TOMOSYNTHESIS;  5/10/2024 9:53 am      ACCESSION NUMBER(S):  [id number]      ORDERING CLINICIAN:  RAQUEL ROJAS      INDICATION:  Screening. Status post left lumpectomy.      COMPARISON:  04/24/2023 and 04/20/2022 mammograms      FINDINGS:  2D and tomosynthesis images were reviewed at 1 mm slice thickness.      Density:  The breast tissue is heterogeneously dense, which may  obscure small masses.      Stable lumpectomy changes are seen in the deep upper outer left  breast and postsurgical scarring in the right breast are again seen.  No suspicious masses or calcifications are identified.      IMPRESSION:  No mammographic evidence of malignancy.      BI-RADS CATEGORY:      BI-RADS Category:  2 Benign.  Recommendation:  Annual Screening.  Recommended Date:  1 Year.  Laterality:  Bilateral.     CT head wo IV contrast    Result Date: 9/4/2024  Interpreted By:  Kika Choi, STUDY: CT HEAD WO IV CONTRAST;  9/4/2024 8:27 am   INDICATION: Signs/Symptoms:vertigo nausea headache.   COMPARISON: None   ACCESSION NUMBER(S): [id number]   ORDERING CLINICIAN: MYRNA SANZ   TECHNIQUE: Examination was performed in the axial plane using soft tissue and bone algorithm.   FINDINGS: INTRACRANIAL: There is prominence of the ventricular system and cerebral sulci consistent with cerebral atrophy. There are periventricular hypodensities consistent with  mild small vessel disease. No mass or mass effect is identified. There is no hemorrhage or subdural fluid collection. There is  no acute infarct.     EXTRACRANIAL: Visualized paranasal sinuses and mastoids are clear.       No acute intracranial pathology.   MACRO: None   Signed by: Kika Choi 9/4/2024 9:04 AM Dictation workstation:   [id number]    CT angio chest for pulmonary embolism    Result Date: 8/21/2024  STUDY: CT Angiogram of the Chest; 08/21/2024 7:51 pm INDICATION: Shortness of breath.  Cough. COMPARISON: XR Chest 08/21/2024;  CTA Chest 08/06/2020. ACCESSION NUMBER(S): [id number] ORDERING CLINICIAN: JIN WEEMS TECHNIQUE:  CTA of the chest was performed with intravenous contrast. Images are reviewed and processed at a workstation according to the CT angiogram protocol with 3-D and/or MIP post processing imaging generated.  Omnipaque 350, 75 mL was administered intravenously. Automated mA/kV exposure control was utilized and patient examination was performed in strict accordance with principles of ALARA. FINDINGS: Pulmonary arteries are adequately opacified without acute or chronic filling defects.  The thoracic aorta is normal in course and caliber without dissection or aneurysm. The heart is normal in size without pericardial effusion.  Thoracic lymph nodes are not enlarged. There is no pleural effusion, pleural thickening, or pneumothorax. The airways are patent. There are small patchy infiltrates in bilateral upper and lower lobes. There is mild bronchiectasis. Upper abdomen demonstrates no acute pathology. There are no acute fractures.  No suspicious bony lesions.    1. No pulmonary emboli. 2. There are small patchy infiltrates in bilateral upper and lower lobes which may represent pneumonia. 3. Mild bronchiectasis. Signed by Marcos Perez MD    ASSESSMENT/PLAN:  74 y.o. female with  stage IIB left breast cancer s/p neoadjuvant endocrine therapy with progression on imaging s/p breast conserving surgery, followed by adjuvant chemotherapy and radiation. Cosmesis is good. She will continue tamoxifen and follow up  with Carmen Dorsey CNP in med onc. Radiation follow up in 12 mo. Call with any questions or concerns. I have suggested she see her ENT provider.  I am questioning source of vertigo, nausea, hearing loss possibly Meniere's or other etiology.      Danyelle Harrison CNP  [phone]

## 2024-09-12 ENCOUNTER — OFFICE VISIT (OUTPATIENT)
Dept: OTOLARYNGOLOGY | Facility: CLINIC | Age: 74
End: 2024-09-12
Payer: MEDICARE

## 2024-09-12 VITALS — BODY MASS INDEX: 25.58 KG/M2 | TEMPERATURE: 97.7 F | HEIGHT: 62 IN | WEIGHT: 139 LBS

## 2024-09-12 DIAGNOSIS — H90.A22 SENSORINEURAL HEARING LOSS (SNHL) OF LEFT EAR WITH RESTRICTED HEARING OF RIGHT EAR: ICD-10-CM

## 2024-09-12 DIAGNOSIS — H93.12 TINNITUS OF LEFT EAR: ICD-10-CM

## 2024-09-12 DIAGNOSIS — H90.3 ASNHL (ASYMMETRICAL SENSORINEURAL HEARING LOSS): ICD-10-CM

## 2024-09-12 DIAGNOSIS — R42 DIZZINESS: Primary | ICD-10-CM

## 2024-09-12 PROCEDURE — 3008F BODY MASS INDEX DOCD: CPT | Performed by: OTOLARYNGOLOGY

## 2024-09-12 PROCEDURE — 99214 OFFICE O/P EST MOD 30 MIN: CPT | Performed by: OTOLARYNGOLOGY

## 2024-09-12 PROCEDURE — 1036F TOBACCO NON-USER: CPT | Performed by: OTOLARYNGOLOGY

## 2024-09-12 PROCEDURE — 1160F RVW MEDS BY RX/DR IN RCRD: CPT | Performed by: OTOLARYNGOLOGY

## 2024-09-12 PROCEDURE — 1159F MED LIST DOCD IN RCRD: CPT | Performed by: OTOLARYNGOLOGY

## 2024-09-12 NOTE — PROGRESS NOTES
Subjective   Patient ID: Annabel Cottrell is a 74 y.o. female  HPI  Patient is complaining of a 1 month history of intermittent dizziness and imbalance.  She had nausea and vomiting associated with the dizziness.  She has no fevers or chills.  She has no otalgia and no otorrhea.  She does have a history of sudden hearing loss on the left side in 2021.  Review of Systems    Objective   Physical Exam  The following elements of a detailed head and neck exam were performed: General appearance, the skin of the head and neck, inspection of the external ears and ear canal the tympanic membranes, inspection of the mobility of the tympanic membranes, the appearance of the external nose, the nasal mucosa and septum and nasal turbinates, the lips, the teeth, the gums, the tongue, the oral mucosa and the palate, inspection of the tonsils and the posterior pharyngeal wall, indirect mirror laryngoscopy and inspection of the hypopharynx, palpation of the lymph nodes in the neck, and palpation of the thyroid, palpation of the salivary glands, cranial nerve exam including cranial nerves II, III, IV, V, VI, and VII, and cranial nerves IX, X, XI, and XII, and the subjective evaluation of the voice, the inspection of the neck for the presence of respiratory retractions, and the presence or absence of stridor.    The ear canals and tympanic membranes are clear and mobile.  There is no spontaneous nystagmus noted.  The remainder of her exam was within normal limits.  The CT scan of the head dated September 4, 2024 and the MRI of the brain IACs dated March 9, 2021 were reviewed and were noted to be normal.    Assessment/Plan   Diagnoses and all orders for this visit:  Dizziness (Primary)  Tinnitus of left ear  ASNHL (asymmetrical sensorineural hearing loss)  -     Tympanometry Only; Future  -     Electronystagmography; Future  -     Comprehensive hearing test; Future  Sensorineural hearing loss (SNHL) of left ear with restricted hearing of  right ear  -     Tympanometry Only; Future  -     Electronystagmography; Future  -     Comprehensive hearing test; Future     Dizziness and imbalance and hearing loss and tinnitus on the left side concerning for Ménière's disease with previous history of sudden hearing loss on the left side in 2021.  The patient was started on a Medrol pack.  She was also scheduled for an audiogram and tympanogram and VNG test to evaluate her vestibular system objectively and she will follow-up with the result.   [Normal] : Psychiatric [FreeTextEntry8] : see HPI

## 2024-09-13 ENCOUNTER — TELEPHONE (OUTPATIENT)
Dept: OTOLARYNGOLOGY | Facility: CLINIC | Age: 74
End: 2024-09-13
Payer: MEDICARE

## 2024-09-14 DIAGNOSIS — H81.02 MENIERE'S DISEASE OF LEFT EAR: Primary | ICD-10-CM

## 2024-09-14 RX ORDER — METHYLPREDNISOLONE 4 MG/1
TABLET ORAL
Qty: 21 TABLET | Refills: 0 | Status: SHIPPED | OUTPATIENT
Start: 2024-09-14

## 2024-09-16 DIAGNOSIS — E03.9 ACQUIRED HYPOTHYROIDISM: ICD-10-CM

## 2024-09-16 RX ORDER — LEVOTHYROXINE SODIUM 112 UG/1
112 TABLET ORAL DAILY
Qty: 90 TABLET | Refills: 3 | Status: SHIPPED | OUTPATIENT
Start: 2024-09-16 | End: 2025-09-16

## 2024-09-24 ENCOUNTER — APPOINTMENT (OUTPATIENT)
Dept: RADIOLOGY | Facility: HOSPITAL | Age: 74
End: 2024-09-24
Payer: MEDICARE

## 2024-09-27 DIAGNOSIS — R42 VERTIGO: ICD-10-CM

## 2024-09-27 DIAGNOSIS — R05.1 ACUTE COUGH: Primary | ICD-10-CM

## 2024-09-27 RX ORDER — MECLIZINE HYDROCHLORIDE 25 MG/1
25 TABLET ORAL 3 TIMES DAILY PRN
Qty: 30 TABLET | Refills: 0 | Status: SHIPPED | OUTPATIENT
Start: 2024-09-27

## 2024-09-30 NOTE — PROGRESS NOTES
"    ADULT BALANCE FUNCTION TEST (BFT)    Name:  Annabel Cottrell  :  1950  Age:  74 y.o.  Date of Evaluation:  10/9/2024    IMPRESSIONS     Suspected bilateral peripheral vestibular involvement (left ear weaker) given the followin) asymmetric caloric irrigations to the left, 2) low gain for vHIT tracings in both ears, and 3) reduced amplitudes with elevated threshold levels for VEMP testing in both ears. Evidence of low frequency residual function in both ears given present response to air/ice caloric testing. The vestibular system appears to be compensated physiologically and uncompensated functionally. While today's evaluation is indicative of bilateral involvement, interpret results with caution as rotational chair testing is considered the \"gold standard\" for diagnosis of bilateral involvement.     There were no indications of central vestibular system pathway involvement. There were no indications of active Benign Paroxysmal Positional Vertigo (BPPV) present.  Normal observation of gait and transfers. Bedside postural control findings demonstrate abnormal function balance with varying sensory information measured on the mCTIB. Patient is at risk of falling based on today's evaluation and previous history of imbalance with associated falls.    RECOMMENDATIONS     Consider vestibular physical therapy to address uncompensated bilateral vestibulopathy. Emphasis on sensory substitution exercises to account for inadequate vestibular input, gaze stabilization exercises for VOR deficiencies, habituation exercises to triggers, and fall risk prevention.  Consider re-evaluation as medically indicated.  Maintain a healthy lifestyle to help body function overall.  Continue monitoring per ENT/PCP preference.    Time: 9040-5183    HISTORY     Patient was seen for Balance Function Testing (BFT) due to a history of dizziness/imbalance. Vestibular case history collected via patient-clinician interview, patient chart " "review, and patient questionnaires.    Patient reported history of dizziness described as disequilibrium with associated \"sway\" sensation.  Symptoms began suddenly in 2021 with associated hearing loss in the left ear.  Symptoms were intermittent however have become constant to her starting two months ago (08/21/2024), waxing and waning throughout the day.  Associated symptoms include imbalance, unilateral left-sided hearing loss, unilateral left-sided tinnitus, nausea/emesis, slight vertigo/spinning.  Symptoms are provoked by no specific triggers. She notes possible head movements (up/down).  Symptoms are alleviated by resting on her back/laying down.  Patient has participated in vestibular physical therapy in 2021 for three sessions for canalith repositioning maneuvers.  This patient has had a total of 0 falls due to their symptoms, however notes several falls she perceives NOT related to her dizziness/imbalance.   Denied any symptoms provoked by sneezing or coughing, as well as any presence of autophony.   Denied any recent medication changes.   Relevant medical history includes previous sudden hearing loss on left side in 2021, constant left aural fullness, previous BPPV diagnosis per patient description, and high blood pressure (controlled with medications).  Most recent audiologic evaluation performed on 10/04/2024 by Alphonso Kee CCC-GIORGI revealed moderate sensorineural hearing loss 125-4000 Hz sloping to a moderately-severe sensorineural hearing loss at 8000 Hz left ear and hearing within normal limits 125-4000 Hz sloping to a moderate sensorineural hearing loss at 8000 Hz in the right ear. Tympanometry revealed normal eardrum mobility and canal volume, bilaterally.  Patient reported complying with pre-test instructions.     EVALUATION     See VNG, vHIT, & VEMP Raw Data in \"Media\"    TEST RESULTS     BEDSIDE ASSESSMENT TEST  The bedside assessment is an optional portion of the test battery to further " assist in differential diagnosis and screen for eye abnormalities which may affect testing.    Otoscopy: clear ear canals.  Extra-Ocular Range of Motion: normal.Extra-Ocular Range of Motion is performed to evaluate any eye abnormalities prior to testing.  Cover/Uncover: normal. Cover/Uncover test is performed to evaluate for skewed deviation of the eyes prior to testing.  Cervical Neck Exam: normal. Cervical Neck is performed to evaluate any restrictions or pain with neck movement prior to testing.  Vertebral Artery Screen: normal. Vertebral Artery Screen is performed to evaluate for vertebrobasilar insufficiency prior to testing.   Ocular Counter-Roll: normal. Ocular Counter-Roll is performed to evaluate ocular tilt reaction and assess otolith organ function prior to testing.  VOR Cancellation: normal. VOR Cancellation is performed to evaluate fixation suppression prior to testing.  Modified Clinical Test of Sensory Interaction on Balance (mCTSIB): abnormal given significant sway and fall reactions on condition(s) four (eyes closed, foam surface). Pattern is Indicative of vestibular dysfunction. mCTSIB is performed to evaluate balance with varying sensory information.      VIDEONYSTAGMOGRAPHY (VNG) TEST  VNG provides objective indications of peripheral and central vestibulo-ocular pathway involvement. Ocular motor testing to visually guided targets is conducted using a dual channel video-recording technique for the recording of eye movement in the horizontal and vertical planes. Air caloric testing is performed at 48 degrees C and 24 degrees C.    Spontaneous Nystagmus test was absent. Spontaneous nystagmus testing may help with the identification of an acute or uncompensated peripheral vestibular lesion.   Gaze Nystagmus test was normal. Gaze nystagmus testing is to evaluate for nystagmus that is evoked by holding eye gaze in any particular direction. True gaze nystagmus is amplified when vision is denied.    Random Saccades test was normal. Random saccade testing is to evaluate patient's ability to make fast random eye movements along a horizontal moving target.   Smooth Pursuit/Tracking test was normal. Test repeated for best performance. Smooth pursuit/tracking testing is to evaluate the ability to move eyes with a single smoothly moving target.   Optokinetic nystagmus testing was normal. This full-field OPK test is to evaluate the ability of central nervous system to stabilize vision during sustained head movement after the VOR system loses effectiveness.   [place]-Hallpike testing was normal. Of note, persistent low velocity (2 d/s) right-beating nystagmus present in the head right position(s). Nystagmus reduced with fixation light. Patient did not report symptoms of dizziness. Not clinically significant or indicative of BPPV. [place]-Hallpike testing is to provide a diagnosis of Benign Paroxysmal Positional Vertigo (BPPV) of the vertical semicircular canals on the side which is most affected.  Roll testing was normal. Roll testing is to provide a diagnosis of Benign Paroxysmal Positional Vertigo (BPPV) of the horizontal semicircular canals on the side which is most affected.  Positional testing was normal. Of note, persistent low velocity (2 d/s) right-beating nystagmus present in the head center position(s). Nystagmus reduced with fixation light. Patient did not report symptoms of dizziness. Not clinically significant. Positional testing is to evaluate patient's ability to hold a steady gaze while in different positions.  Bithermal caloric testing was abnormal. Unilateral weakness of 69% to the left which is abnormal and a directional preponderance of 38% to the right which is abnormal. Caloric testing is to evaluate for peripheral vestibular lesion.  Given positional nystagmus present, performed ice caloric on left ear to ensure not positional related nystagmus when performing calorics with air stimulus. Ice water  presented for 30 seconds. Results indicated present change in response (4 d/s with air - increase to 13 d/s with ice water). Indicative of residual low frequency vestibular function.       VIDEO HEAD IMPULSE TEST (vHIT)  The vHIT procedure provides objective assessment of the high frequency vestibulo-ocular reflex (VOR) for each semicircular canal. Rapid, random horizontal and vertical thrusts are applied to the patient's head to provoke the VOR. The vHIT procedure includes two separate paradigms: Head Impulse Paradigm (HIMP) and Suppression Head Impulse Paradigm (SHIMP). SHIMP is an optional paradigm that is not appropriate to perform for every patient. However, it is appropriate to perform SHIMP when there is verified evidence of possible vestibulopathy in the traditional HIMP test.     Head Impulse Paradigm (HIMP)   Right Ear   Canal Gain Overt Saccades Covert Saccades   Lateral 0.64 absent absent   Anterior 0.82 absent absent   Posterior 0.48 absent absent        Head Impulse Paradigm (HIMP)   Left Ear   Canal Gain Overt Saccades Covert Saccades   Lateral 0.50 present absent   Anterior 0.48 absent absent   Posterior 0.58 absent absent     Total gain for right lateral/posterior and left lateral/posterior/anterior canals was below normal limits (<0.80 is abnormal for lateral, <0.70 is abnormal for vertical).  There was evidence of overt saccades in the left lateral canals.      CERVICAL VESTIBULAR EVOKED MYOGENIC POTENTIALS (cVEMP)  The cVEMP procedure is an evoked potential used to test the saccule and its afferent pathway. An asymmetry ratio is utilized to determine side of lesion. The cVEMP was recorded with the patient cervical extension to produce isolated contraction of the ipsilateral sternocleidomastoid (SCM) muscle. The cVEMP was recorded using a 500 Hz tone burst or 1000 Hz tone burst at a rate of 5.1.      Ear Presentation Level Amplitude P1 Latency N1 Latency  Amplitude Asymmetry Ratio   Right 105 dB  nHL 39.30 µV 17.00 ms 24.00 ms Could not calculate due to threshold difference.   Left 100 dB nHL 37.25 µV 16.00 ms 21.00 ms       Superior Canal Dehiscence Screening (75 dB nHL): N/A bilaterally given threshold levels    Replicable cVEMP responses were outside of normal limits in the right ear(s). The amplitude asymmetry ratio could not be calculated, however reduced amplitude levels bilaterally.        OCULAR VESTIBULAR EVOKED MYOGENIC POTENTIALS (oVEMP)  The oVEMP procedure is an evoked potential used to test the utricle and its afferent pathway. An asymmetry ratio is utilized to determine side of lesion. This is a contralateral recording. The oVEMP was recorded with the patient seated upright with eyes tilted upward to produce isolated contraction of the contralateral inferior oblique muscle. The oVEMP were recorded using a 500 Hz, 2000 Hz, 4000 Hz tone burst at a rate of 5.1.       Ear Presentation Level Amplitude N1 Latency  P1 Latency  Amplitude Asymmetry Ratio   Right 95 dB nHL 4.66 µV 12.33 ms 15.33 ms Could not calculate due to threshold difference.   Left 105 dB nHL 3.35 µV 12.67 ms 15.67 ms       Meniere's Disease Screening (2000 Hz): Negative bilaterally  Superior Canal Dehiscence Screening (4000 Hz): Negative bilaterally    Replicable oVEMP responses were outside of normal limits in the left ear(s).  The amplitude asymmetry ratio could not be calculated, however reduced amplitude levels bilaterally.      Raw data reviewed by a member of the Vestibular & Balance Disorders team. Testing and interpretation of results completed by GIGI Thompson, CCC-GIORGI. It was my pleasure to evaluate this patient.     Gigi Thompson, CCC-A  Senior Clinical Vestibular Audiologist

## 2024-10-01 ENCOUNTER — HOSPITAL ENCOUNTER (OUTPATIENT)
Dept: RADIOLOGY | Facility: HOSPITAL | Age: 74
Discharge: HOME | End: 2024-10-01
Payer: MEDICARE

## 2024-10-01 DIAGNOSIS — R05.1 ACUTE COUGH: ICD-10-CM

## 2024-10-01 PROCEDURE — 71046 X-RAY EXAM CHEST 2 VIEWS: CPT

## 2024-10-01 PROCEDURE — 71046 X-RAY EXAM CHEST 2 VIEWS: CPT | Performed by: RADIOLOGY

## 2024-10-04 ENCOUNTER — CLINICAL SUPPORT (OUTPATIENT)
Dept: AUDIOLOGY | Facility: CLINIC | Age: 74
End: 2024-10-04
Payer: MEDICARE

## 2024-10-04 DIAGNOSIS — H93.8X2 FULLNESS IN EAR, LEFT: ICD-10-CM

## 2024-10-04 DIAGNOSIS — R42 DIZZINESS: ICD-10-CM

## 2024-10-04 DIAGNOSIS — H90.3 ASNHL (ASYMMETRICAL SENSORINEURAL HEARING LOSS): ICD-10-CM

## 2024-10-04 DIAGNOSIS — H90.A22 SENSORINEURAL HEARING LOSS (SNHL) OF LEFT EAR WITH RESTRICTED HEARING OF RIGHT EAR: Primary | ICD-10-CM

## 2024-10-04 PROCEDURE — 92567 TYMPANOMETRY: CPT

## 2024-10-04 PROCEDURE — 92557 COMPREHENSIVE HEARING TEST: CPT

## 2024-10-04 ASSESSMENT — PAIN - FUNCTIONAL ASSESSMENT: PAIN_FUNCTIONAL_ASSESSMENT: 0-10

## 2024-10-04 ASSESSMENT — PAIN SCALES - GENERAL: PAINLEVEL_OUTOF10: 0 - NO PAIN

## 2024-10-04 NOTE — PROGRESS NOTES
AUDIOLOGIC EVALUATION      Name:  Annabel Cottrell  :  1950  Age:  74 y.o.  Date of Evaluation:  10/4/2024    Time: 9192-6935    HISTORY:  Annabel Cottrell, 74 y.o., was seen for an audiologic assessment at the request of Dr. Angel Cueto. She reported a decline in left hearing sensitivity, constant left aural fullness, and constant dizziness that have persisted for a little over a month. She noted that her left ear feels and sounds as if it is underwater. She has two kinds of dizziness; the most severe dizziness feels like everything is moving around her and results in emesis. The less severe dizziness makes her feel as if she's swaying in different directions and a sense of dysequilibrium. She had a fall in the shower due to her dizziness. She has a history of sudden left hearing loss in . At that time she obtained a left Oticon More 1 hearing aid from Dr. Cueto' office. She has not worn it consistently as she feels like she wasn't instructed how to use it properly. She denied otalgia, otorrhea, and tinnitus.      RESULTS:  Otoscopic Evaluation:  Right Ear: Unremarkable  Left Ear: Unremarkable    Immittance Measures:  Right Ear: Type A -- indicating normal volume, pressure, and static compliance  Left Ear: Type A -- indicating normal volume, pressure, and static compliance    Acoustic Reflexes:  Right Ear: (Ipsilateral) Responses present 500-4000 Hz.  Left Ear: (Ipsilateral) Responses present 500-4000 Hz.    Pure Tone Audiometry:    Right Ear: Hearing within normal limits 125-4000 Hz sloping to a moderate sensorineural hearing loss at 8000 Hz    Left Ear: Moderate sensorineural hearing loss 125-4000 Hz sloping to a moderately-severe sensorineural hearing loss at 8000 Hz    Asymmetry: Yes, left ear poorer 125-8000 Hz    No previous audiogram available in Epic to view.     Reliability:   Good    Speech Audiometry:   Right: Speech Reception Threshold (SRT) was obtained at 25 dBHL.   SRT/PTA in Good agreement  with pure tone average.    Left: Speech Reception Threshold (SRT) was obtained at 55 dBHL w/ masking.   SRT/PTA in Good agreement with pure tone average.    Word Recognition Scores (WRS):  Right Ear: excellent (100%) in quiet when words were presented at 65 dBHL.   Left Ear: fair (68%) in quiet when words were presented at 95 dBHL w/ masking.     Asymmetry: Yes, left ear poorer    HEARING AID:  Initial listening check revealed no sound output. Visual inspection revealed her wax trap was occluded with cerumen. Her hearing aid was cleaned and checked. Appropriate sound output was restored. Her settings were read out in [employer]. She was reprogrammed to today's assessment. I demonstrated how to replace her wax trap.    Hearing Aid Information Left    Oticon   Model More 1 miniRITE-R   Serial Number [id number]    2, 85 dB   Dome 6 mm, open dome   Retention No   Wax Traps ProWax miniFit     Repair Warranty Obtained at outside office   Loss and Damage Warranty Obtained at outside office     Her  serial number is [id number].       IMPRESSIONS:  Today's testing revealed normal ear canal volume, middle ear pressure, and tympanic membrane compliance in both ears. She has hearing within normal limits sloping to a moderate sensorineural hearing loss in her right ear and a moderate to moderately severe sensorineural hearing loss in her left ear. She has an asymmetric sensorineural hearing loss, left ear poorer. She has excellent word recognition in her right ear (100%) and fair word recognition in her left ear (68%). The results were discussed with the patient. She should follow-up for vestibular testing with Dr. Fabiola Yarbrough and return to Dr. Cueto' office as scheduled.       RECOMMENDATIONS:  1.) Continue medical follow up with Dr. Cueto as recommended.  2.) Return for audiologic evaluation in conjunction with medical management or annually (whichever is sooner) to monitor hearing sensitivity and assess  middle ear status or sooner should concerns arise. The audiology department can be reached at [phone] to schedule an appointment.   3.) Strive for full-time device use during waking hours, except when activities preclude device safety.  4.) Return for vestibular testing with Dr. Fabiola Yarbrough.       Alphonso Kee, CCC-A  Clinical Audiologist      KEY  SNHL Sensorineural Hearing Loss   CHL Conductive Hearing Loss   MHL Mixed Hearing Loss   SSNHL Sudden Sensorineural Hearing Loss   WNL Within Normal Limits   PTA Pure Tone Average   TM Tympanic Membrane   ECV Ear Canal Volume   SRT Speech Reception Threshold   WRS Word Recognition Score

## 2024-10-09 ENCOUNTER — CLINICAL SUPPORT (OUTPATIENT)
Dept: AUDIOLOGY | Facility: CLINIC | Age: 74
End: 2024-10-09
Payer: MEDICARE

## 2024-10-09 DIAGNOSIS — H90.A22 SENSORINEURAL HEARING LOSS (SNHL) OF LEFT EAR WITH RESTRICTED HEARING OF RIGHT EAR: ICD-10-CM

## 2024-10-09 DIAGNOSIS — H90.3 ASNHL (ASYMMETRICAL SENSORINEURAL HEARING LOSS): ICD-10-CM

## 2024-10-09 DIAGNOSIS — R42 DIZZINESS: Primary | ICD-10-CM

## 2024-10-09 PROCEDURE — 92700 UNLISTED ORL SERVICE/PX: CPT

## 2024-10-09 PROCEDURE — 92537 CALORIC VSTBLR TEST W/REC: CPT | Mod: 22

## 2024-10-09 PROCEDURE — 92540 BASIC VESTIBULAR EVALUATION: CPT

## 2024-10-09 PROCEDURE — 92519 VEMP TST I&R CERVICAL&OCULAR: CPT

## 2024-10-09 NOTE — PATIENT INSTRUCTIONS
BALANCE FUNCTION TEST (BFT)  AFTER VISIT SUMMARY      TESTING SUMMARY     The purpose of today's testing was to evaluate for any vestibular system (inner ear) involvement to account for your symptoms of dizziness/imbalance. Deep inside each of your ears, there are 5 balance organs which contribute to your ability to maintain balance and reduce dizziness. Our vestibular system involves 3 semicircular canals (“spinning detectors”) and 2 otolith organs (“gravity sensors”).    IMPRESSIONS     Based on today's evaluation, your vestibular system appears to be weakened on both sides (left worse than right) and possibly contributing as a source for your symptoms.    RECOMMENDATIONS     Continue medical follow up with Dr. Cueto.   Consider further vestibular physical therapy to address vestibular loss.   Consider re-evaluation as medically indicated.  Maintain a healthy lifestyle to help body function overall.    Testing and interpretation of results completed by Alphonso Thompson CCC-A CCVR. It was my pleasure to evaluate this patient.       Alphonso Thompson, CCC-A CCVR  Senior Clinical Vestibular Audiologist

## 2024-10-12 ENCOUNTER — APPOINTMENT (OUTPATIENT)
Dept: OTOLARYNGOLOGY | Facility: CLINIC | Age: 74
End: 2024-10-12
Payer: MEDICARE

## 2024-10-15 ENCOUNTER — TELEPHONE (OUTPATIENT)
Dept: DERMATOLOGY | Facility: CLINIC | Age: 74
End: 2024-10-15

## 2024-10-15 ENCOUNTER — APPOINTMENT (OUTPATIENT)
Dept: OTOLARYNGOLOGY | Facility: CLINIC | Age: 74
End: 2024-10-15
Payer: MEDICARE

## 2024-10-15 VITALS — HEIGHT: 62 IN | BODY MASS INDEX: 25.58 KG/M2 | TEMPERATURE: 97.7 F | WEIGHT: 139 LBS

## 2024-10-15 DIAGNOSIS — H81.02 MENIERE'S DISEASE OF LEFT EAR: ICD-10-CM

## 2024-10-15 DIAGNOSIS — H93.12 TINNITUS OF LEFT EAR: ICD-10-CM

## 2024-10-15 DIAGNOSIS — R42 DIZZINESS: Primary | ICD-10-CM

## 2024-10-15 DIAGNOSIS — H90.A22 SENSORINEURAL HEARING LOSS (SNHL) OF LEFT EAR WITH RESTRICTED HEARING OF RIGHT EAR: ICD-10-CM

## 2024-10-15 DIAGNOSIS — H90.3 ASNHL (ASYMMETRICAL SENSORINEURAL HEARING LOSS): ICD-10-CM

## 2024-10-15 PROCEDURE — 3008F BODY MASS INDEX DOCD: CPT | Performed by: OTOLARYNGOLOGY

## 2024-10-15 PROCEDURE — 1036F TOBACCO NON-USER: CPT | Performed by: OTOLARYNGOLOGY

## 2024-10-15 PROCEDURE — 1160F RVW MEDS BY RX/DR IN RCRD: CPT | Performed by: OTOLARYNGOLOGY

## 2024-10-15 PROCEDURE — 99214 OFFICE O/P EST MOD 30 MIN: CPT | Performed by: OTOLARYNGOLOGY

## 2024-10-15 PROCEDURE — 1159F MED LIST DOCD IN RCRD: CPT | Performed by: OTOLARYNGOLOGY

## 2024-10-15 NOTE — PROGRESS NOTES
Subjective   Patient ID: Annabel Cottrell is a 74 y.o. female  HPI  Patient presents for follow-up for intermittent dizziness and imbalance and asymmetric sensorineural hearing loss on the left side concerning for Ménière's disease.  She is feeling better after taking the Medrol and starting to follow the low-salt diet and her dizziness is almost completely resolved.  Review of Systems    Objective   Physical Exam  The ear canals and tympanic membranes are clear and mobile.  There is no spontaneous nystagmus noted.  The hearing test reveals asymmetric sensorineural loss on the left side.  The tympanograms are normal.  The VNG test reveals bilateral vestibular weakness more significantly on the left side.  The recent CT scan of the head dated September 3, 2024 was reviewed and noted to be normal.    Assessment/Plan   Diagnoses and all orders for this visit:  Dizziness (Primary)  Tinnitus of left ear  Sensorineural hearing loss (SNHL) of left ear with restricted hearing of right ear  ASNHL (asymmetrical sensorineural hearing loss)  -     Creatinine; Future  -     Blood Urea Nitrogen; Future  -     MR IAC w and wo IV contrast; Future  Meniere's disease of left ear  -     Creatinine; Future  -     Blood Urea Nitrogen; Future  -     MR IAC w and wo IV contrast; Future     1.  Bilateral sensorineural hearing loss with asymmetry on the left side with progression of the hearing loss since 2021.  An MRI of the brain IACs was ordered to rule out a retrocochlear lesion such as acoustic normal multiple sclerosis or CVA.  2.  Intermittent dizziness and imbalance with bilateral vestibular weakness more significantly on the left side.  This may be secondary to Ménière's disease especially in light of the asymmetric hearing on the left side.  She improved after taking a course of Medrol and she was started on a low-salt diet at this point.

## 2024-10-18 DIAGNOSIS — F40.240 CLAUSTROPHOBIA: Primary | ICD-10-CM

## 2024-10-18 RX ORDER — DIAZEPAM 5 MG/1
TABLET ORAL
Qty: 1 TABLET | Refills: 0 | Status: SHIPPED | OUTPATIENT
Start: 2024-10-18

## 2024-10-21 ENCOUNTER — LAB (OUTPATIENT)
Dept: LAB | Facility: LAB | Age: 74
End: 2024-10-21
Payer: MEDICARE

## 2024-10-21 DIAGNOSIS — H90.3 ASNHL (ASYMMETRICAL SENSORINEURAL HEARING LOSS): ICD-10-CM

## 2024-10-21 DIAGNOSIS — H81.02 MENIERE'S DISEASE OF LEFT EAR: ICD-10-CM

## 2024-10-21 LAB
BUN SERPL-MCNC: 19 MG/DL (ref 6–23)
CREAT SERPL-MCNC: 1.49 MG/DL (ref 0.5–1.05)
EGFRCR SERPLBLD CKD-EPI 2021: 37 ML/MIN/1.73M*2

## 2024-10-21 PROCEDURE — 82565 ASSAY OF CREATININE: CPT

## 2024-10-21 PROCEDURE — 84520 ASSAY OF UREA NITROGEN: CPT

## 2024-10-21 PROCEDURE — 36415 COLL VENOUS BLD VENIPUNCTURE: CPT

## 2024-10-22 ENCOUNTER — HOSPITAL ENCOUNTER (OUTPATIENT)
Dept: RADIOLOGY | Facility: CLINIC | Age: 74
Discharge: HOME | End: 2024-10-22
Payer: MEDICARE

## 2024-10-22 DIAGNOSIS — H90.3 ASNHL (ASYMMETRICAL SENSORINEURAL HEARING LOSS): ICD-10-CM

## 2024-10-22 DIAGNOSIS — H81.02 MENIERE'S DISEASE OF LEFT EAR: ICD-10-CM

## 2024-10-22 PROCEDURE — 70553 MRI BRAIN STEM W/O & W/DYE: CPT | Performed by: RADIOLOGY

## 2024-10-22 PROCEDURE — 2550000001 HC RX 255 CONTRASTS: Performed by: OTOLARYNGOLOGY

## 2024-10-22 PROCEDURE — 70553 MRI BRAIN STEM W/O & W/DYE: CPT

## 2024-10-22 PROCEDURE — A9575 INJ GADOTERATE MEGLUMI 0.1ML: HCPCS | Performed by: OTOLARYNGOLOGY

## 2024-10-22 RX ORDER — GADOTERATE MEGLUMINE 376.9 MG/ML
13 INJECTION INTRAVENOUS
Status: COMPLETED | OUTPATIENT
Start: 2024-10-22 | End: 2024-10-22

## 2024-10-31 ENCOUNTER — LAB (OUTPATIENT)
Dept: LAB | Facility: LAB | Age: 74
End: 2024-10-31
Payer: MEDICARE

## 2024-10-31 DIAGNOSIS — N18.30 CHRONIC KIDNEY DISEASE, STAGE 3 UNSPECIFIED (MULTI): Primary | ICD-10-CM

## 2024-10-31 LAB
ERYTHROCYTE [DISTWIDTH] IN BLOOD BY AUTOMATED COUNT: 12.9 % (ref 11.5–14.5)
HCT VFR BLD AUTO: 41.1 % (ref 36–46)
HGB BLD-MCNC: 13.9 G/DL (ref 12–16)
MCH RBC QN AUTO: 31.5 PG (ref 26–34)
MCHC RBC AUTO-ENTMCNC: 33.8 G/DL (ref 32–36)
MCV RBC AUTO: 93 FL (ref 80–100)
NRBC BLD-RTO: 0 /100 WBCS (ref 0–0)
PLATELET # BLD AUTO: 258 X10*3/UL (ref 150–450)
RBC # BLD AUTO: 4.41 X10*6/UL (ref 4–5.2)
WBC # BLD AUTO: 8 X10*3/UL (ref 4.4–11.3)

## 2024-10-31 PROCEDURE — 80069 RENAL FUNCTION PANEL: CPT

## 2024-10-31 PROCEDURE — 82043 UR ALBUMIN QUANTITATIVE: CPT

## 2024-10-31 PROCEDURE — 36415 COLL VENOUS BLD VENIPUNCTURE: CPT

## 2024-10-31 PROCEDURE — 80061 LIPID PANEL: CPT

## 2024-10-31 PROCEDURE — 85027 COMPLETE CBC AUTOMATED: CPT

## 2024-10-31 PROCEDURE — 82570 ASSAY OF URINE CREATININE: CPT

## 2024-10-31 PROCEDURE — 81003 URINALYSIS AUTO W/O SCOPE: CPT

## 2024-10-31 NOTE — PROGRESS NOTES
HEARING AID CHECK    Name:  Annabel Cottrell  :    1950  Age:    74 y.o.    HEARING AID INFORMATION:    Right Hearing Aid: []  SN: []  Warranty: []  Left Hearing Aid: []  SN: []  Warranty: []  : []  SN: []   Length   Right: []  Left: []  Dome/Earmold  Right: []  Left: []  Wax Filter  []  Battery Size  []  TLC Expiration:  []  HCS Follow-up Expiration  []  [facility] Follow-up Visits  Visit [] /3, expiring []      HISTORY:    []    EXAM/PROCEDURES:    [] Otoscopy revealed minimal non-occluding cerumen with normal appearing tympanic membranes, bilaterally. []    Visual inspection revealed [] . Initial listening check revealed [] .     Cleaned and checked hearing aids.  Vacuumed ariel ports and  ports. Cleaned battery contacts. Earmolds cleaned in ultrasonic. [] Replaced tubing. Replaced wax filters and domes.     Placed in aurovac drying chamber. [] Completed Redux dehumidification treatment for patient's hearing aids where [] uL of moisture was removed during a [] minute cycle.    Final listening check indicated adequate sound quality and function with no distortion of internal feedback. Patient reported improvement in sound quality following clean and check. Confirmed no firmware update was needed.       Patient was counseled regarding the need for an updated hearing test in the event that their hearing has declined, and in order to appropriately program the hearing aids.    Due age of patient's hearing aids, a Hearing Aid Liability Release form was signed on []    RECOMMENDATIONS AND FOLLOW-UP:    []   - Continue use of amplification and follow-up as recommended for hearing aid maintenance and adjustments.  - Recommended 6-month follow-up HAC. Patient to contact the office sooner if problems arise.  - Monitor and recheck hearing as warranted.  - Counseled regarding results and recommendations.      Alphonso Roland.  Clinical Audiologist

## 2024-11-01 LAB
ALBUMIN SERPL BCP-MCNC: 4.5 G/DL (ref 3.4–5)
ANION GAP SERPL CALC-SCNC: 15 MMOL/L (ref 10–20)
APPEARANCE UR: CLEAR
BILIRUB UR STRIP.AUTO-MCNC: NEGATIVE MG/DL
BUN SERPL-MCNC: 18 MG/DL (ref 6–23)
CALCIUM SERPL-MCNC: 9.5 MG/DL (ref 8.6–10.6)
CHLORIDE SERPL-SCNC: 103 MMOL/L (ref 98–107)
CO2 SERPL-SCNC: 28 MMOL/L (ref 21–32)
COLOR UR: NORMAL
CREAT SERPL-MCNC: 1.28 MG/DL (ref 0.5–1.05)
CREAT UR-MCNC: 72.7 MG/DL (ref 20–320)
EGFRCR SERPLBLD CKD-EPI 2021: 44 ML/MIN/1.73M*2
GLUCOSE SERPL-MCNC: 113 MG/DL (ref 74–99)
GLUCOSE UR STRIP.AUTO-MCNC: NORMAL MG/DL
KETONES UR STRIP.AUTO-MCNC: NEGATIVE MG/DL
LEUKOCYTE ESTERASE UR QL STRIP.AUTO: NEGATIVE
MICROALBUMIN UR-MCNC: <7 MG/L
MICROALBUMIN/CREAT UR: NORMAL MG/G{CREAT}
NITRITE UR QL STRIP.AUTO: NEGATIVE
PH UR STRIP.AUTO: 5.5 [PH]
PHOSPHATE SERPL-MCNC: 3.6 MG/DL (ref 2.5–4.9)
POTASSIUM SERPL-SCNC: 3.5 MMOL/L (ref 3.5–5.3)
PROT UR STRIP.AUTO-MCNC: NEGATIVE MG/DL
RBC # UR STRIP.AUTO: NEGATIVE /UL
SODIUM SERPL-SCNC: 142 MMOL/L (ref 136–145)
SP GR UR STRIP.AUTO: 1.01
UROBILINOGEN UR STRIP.AUTO-MCNC: NORMAL MG/DL

## 2024-11-03 ASSESSMENT — DERMATOLOGY QUALITY OF LIFE (QOL) ASSESSMENT
RATE HOW BOTHERED YOU ARE BY SYMPTOMS OF YOUR SKIN PROBLEM (EG, ITCHING, STINGING BURNING, HURTING OR SKIN IRRITATION): 6 - ALWAYS BOTHERED
RATE HOW EMOTIONALLY BOTHERED YOU ARE BY YOUR SKIN PROBLEM (FOR EXAMPLE, WORRY, EMBARRASSMENT, FRUSTRATION): 6 - ALWAYS BOTHERED
RATE HOW BOTHERED YOU ARE BY EFFECTS OF YOUR SKIN PROBLEMS ON YOUR ACTIVITIES (EG, GOING OUT, ACCOMPLISHING WHAT YOU WANT, WORK ACTIVITIES OR YOUR RELATIONSHIPS WITH OTHERS): 0 - NEVER BOTHERED
RATE HOW BOTHERED YOU ARE BY SYMPTOMS OF YOUR SKIN PROBLEM (EG, ITCHING, STINGING BURNING, HURTING OR SKIN IRRITATION): 6 - ALWAYS BOTHERED
RATE HOW BOTHERED YOU ARE BY EFFECTS OF YOUR SKIN PROBLEMS ON YOUR ACTIVITIES (EG, GOING OUT, ACCOMPLISHING WHAT YOU WANT, WORK ACTIVITIES OR YOUR RELATIONSHIPS WITH OTHERS): 0 - NEVER BOTHERED
RATE HOW EMOTIONALLY BOTHERED YOU ARE BY YOUR SKIN PROBLEM (FOR EXAMPLE, WORRY, EMBARRASSMENT, FRUSTRATION): 6 - ALWAYS BOTHERED

## 2024-11-04 ENCOUNTER — OFFICE VISIT (OUTPATIENT)
Dept: DERMATOLOGY | Facility: HOSPITAL | Age: 74
End: 2024-11-04
Payer: MEDICARE

## 2024-11-04 ENCOUNTER — HOSPITAL ENCOUNTER (OUTPATIENT)
Dept: RADIOLOGY | Facility: CLINIC | Age: 74
Discharge: HOME | End: 2024-11-04
Payer: MEDICARE

## 2024-11-04 DIAGNOSIS — L57.0 ACTINIC KERATOSIS: Primary | ICD-10-CM

## 2024-11-04 DIAGNOSIS — N18.30 CHRONIC KIDNEY DISEASE, STAGE 3 UNSPECIFIED (MULTI): ICD-10-CM

## 2024-11-04 DIAGNOSIS — L40.9 PSORIASIS: ICD-10-CM

## 2024-11-04 DIAGNOSIS — Z79.899 ENCOUNTER FOR DRUG THERAPY: ICD-10-CM

## 2024-11-04 DIAGNOSIS — Z85.828 PERSONAL HISTORY OF SKIN CANCER: ICD-10-CM

## 2024-11-04 LAB
CHOLEST SERPL-MCNC: 165 MG/DL (ref 0–199)
CHOLESTEROL/HDL RATIO: 2.6
HDLC SERPL-MCNC: 64.7 MG/DL
LDLC SERPL CALC-MCNC: 73 MG/DL
NON HDL CHOLESTEROL: 100 MG/DL (ref 0–149)
TRIGL SERPL-MCNC: 138 MG/DL (ref 0–149)
VLDL: 28 MG/DL (ref 0–40)

## 2024-11-04 PROCEDURE — 1159F MED LIST DOCD IN RCRD: CPT | Performed by: DERMATOLOGY

## 2024-11-04 PROCEDURE — 1036F TOBACCO NON-USER: CPT | Performed by: DERMATOLOGY

## 2024-11-04 PROCEDURE — 1160F RVW MEDS BY RX/DR IN RCRD: CPT | Performed by: DERMATOLOGY

## 2024-11-04 PROCEDURE — 76770 US EXAM ABDO BACK WALL COMP: CPT

## 2024-11-04 PROCEDURE — 76770 US EXAM ABDO BACK WALL COMP: CPT | Performed by: RADIOLOGY

## 2024-11-04 PROCEDURE — 17000 DESTRUCT PREMALG LESION: CPT | Performed by: DERMATOLOGY

## 2024-11-04 PROCEDURE — 99214 OFFICE O/P EST MOD 30 MIN: CPT | Performed by: DERMATOLOGY

## 2024-11-04 RX ORDER — FLUOROURACIL 50 MG/G
CREAM TOPICAL 2 TIMES DAILY
Qty: 40 G | Refills: 3 | Status: SHIPPED | OUTPATIENT
Start: 2024-11-04

## 2024-11-04 RX ORDER — ACITRETIN 10 MG/1
10 CAPSULE ORAL DAILY
Qty: 30 CAPSULE | Refills: 2 | Status: SHIPPED | OUTPATIENT
Start: 2024-11-04

## 2024-11-04 ASSESSMENT — DERMATOLOGY PATIENT ASSESSMENT
DO YOU HAVE ANY NEW OR CHANGING LESIONS: YES
WHERE ARE THESE NEW OR CHANGING LESIONS LOCATED: HANDS, LEGS, FEET
ARE YOU ON BIRTH CONTROL: NO
DO YOU HAVE IRREGULAR MENSTRUAL CYCLES: NO
ARE YOU TRYING TO GET PREGNANT: NO
DO YOU USE SUNSCREEN: OCCASIONALLY
HAVE YOU HAD OR DO YOU HAVE VASCULAR DISEASE: NO
ARE YOU AN ORGAN TRANSPLANT RECIPIENT: NO
HAVE YOU HAD OR DO YOU HAVE A STAPH INFECTION: NO
DO YOU USE A TANNING BED: NO

## 2024-11-04 ASSESSMENT — DERMATOLOGY QUALITY OF LIFE (QOL) ASSESSMENT
RATE HOW BOTHERED YOU ARE BY SYMPTOMS OF YOUR SKIN PROBLEM (EG, ITCHING, STINGING BURNING, HURTING OR SKIN IRRITATION): 0 - NEVER BOTHERED
RATE HOW EMOTIONALLY BOTHERED YOU ARE BY YOUR SKIN PROBLEM (FOR EXAMPLE, WORRY, EMBARRASSMENT, FRUSTRATION): 1
DATE THE QUALITY-OF-LIFE ASSESSMENT WAS COMPLETED: 67148
DATE THE QUALITY-OF-LIFE ASSESSMENT WAS COMPLETED: 67148
RATE HOW EMOTIONALLY BOTHERED YOU ARE BY YOUR SKIN PROBLEM (FOR EXAMPLE, WORRY, EMBARRASSMENT, FRUSTRATION): 0 - NEVER BOTHERED
RATE HOW BOTHERED YOU ARE BY EFFECTS OF YOUR SKIN PROBLEMS ON YOUR ACTIVITIES (EG, GOING OUT, ACCOMPLISHING WHAT YOU WANT, WORK ACTIVITIES OR YOUR RELATIONSHIPS WITH OTHERS): 0 - NEVER BOTHERED
RATE HOW BOTHERED YOU ARE BY EFFECTS OF YOUR SKIN PROBLEMS ON YOUR ACTIVITIES (EG, GOING OUT, ACCOMPLISHING WHAT YOU WANT, WORK ACTIVITIES OR YOUR RELATIONSHIPS WITH OTHERS): 1
RATE HOW BOTHERED YOU ARE BY SYMPTOMS OF YOUR SKIN PROBLEM (EG, ITCHING, STINGING BURNING, HURTING OR SKIN IRRITATION): 1
ARE THERE EXCLUSIONS OR EXCEPTIONS FOR THE QUALITY OF LIFE ASSESSMENT: NO

## 2024-11-04 ASSESSMENT — PATIENT GLOBAL ASSESSMENT (PGA): PATIENT GLOBAL ASSESSMENT: PATIENT GLOBAL ASSESSMENT:  1 - CLEAR

## 2024-11-04 ASSESSMENT — ITCH NUMERIC RATING SCALE: HOW SEVERE IS YOUR ITCHING?: 0

## 2024-11-04 NOTE — PATIENT INSTRUCTIONS
Finish the 5-fluorouracil cream on your hands  Next treat your left foot and lower leg 2x per day for 2-3 weeks  Next move on to your right foot and lower leg 2x per day for 2-3 weeks

## 2024-11-04 NOTE — PROGRESS NOTES
Subjective     Annabel Cottrell is a 74 y.o. female who presents for the following: Rash (Hands, legs, feet). Last derm visit 4/4/24 for inflamed cyst on back and calluses on feet.  She was supposed to follow up 7/2024 for her Full Skin Exam but cancelled    History of breast cancer 2019 s/p surgery, radiation, chemo on tamoxifen and zometa since 2021    Review of Systems:  No other skin or systemic complaints other than what is documented elsewhere in the note.    The following portions of the chart were reviewed this encounter and updated as appropriate:   Tobacco  Allergies  Meds  Problems  Med Hx  Surg Hx         Skin Cancer History  No skin cancer on file.      Specialty Problems          Dermatology Problems    History of nonmelanoma skin cancer     Lesion 1: Nodular and Infiltrative Basal Cell Carcinoma.  Deep margins involved.Year Diagnosed:  2022. August.Location:  Left Neck.Treatment(s): Mohs 10/2022 [facility]: [id number]    Lesion 2: Nodular Basal Cell Carcinoma.  Deep margins involved.Year Diagnosed:  2022. August.Location:  Left Upper Arm.Treatment(s): ED&C 10/2022 [facility]: [id number]    Lesion 3: Superficial Basal Cell Carcinoma.Year Diagnosed:  2022. August.Location:  Right Shin.Treatment(s): 5-FU, start 1/2023Pathology: [id number]    Lesion 4: Invasive squamous cell carcinoma.    Deep margins involved.Year Diagnosed:  2023. January.Location:  Right Anterior TrapeziusTreatment(s): **Pt cancelled surgery with derm surg, thought it was completely healed/resolved**Pathology:      7/2023. 2 skin lesions were biopsied from left lateral ankle superior that demonstrated squamous cell carcinoma in situ and from left lateral ankle inferior that demonstrated squamous cell carcinoma in situ. Treated with 5-fluorouracil cream but admitted to inconsistent use**             Objective   Well appearing patient in no apparent distress; mood and affect are within normal limits.    A focused  skin examination was performed. All findings within normal limits unless otherwise noted below.    Assessment/Plan   1. Actinic keratosis (5)  Left Foot - Anterior, Left Lower Leg - Anterior, Right Foot - Anterior, Right Lower Leg - Anterior, Right Medial Heel  Erythematous scaly macule(s)    - for the dorsum of feet, edges of feet, and lower legs below the knees, diffuse actinic damage with >50 actinic keratoses   - on the right medial ankle there is a 0.8 cm hyperkeratotic papule with surrounding erythema    -Discussed nature of diagnosis and treatment options.   -Patient wishes to proceed with Cryotherapy today for the largest on right medial ankle    - for the dorsum of feet, edges of feet, and lower legs below the knees, diffuse actinic damage with >50 actinic keratoses   - plan to treat with 5-fluorouracil cream  - she is currently treating dorsum of hands with good response    Finish the 5-fluorouracil cream on your hands  Next treat your left foot and lower leg 2x per day for 2-3 weeks  Next move on to your right foot and lower leg 2x per day for 2-3 weeks      -Possible side effects of liquid nitrogen treatment reviewed including formation of blisters, crusting, tenderness, scar, and discoloration which may be permanent.  -Patient advised to return the office for re-evaluation if the treated lesion(s) do not resolve within 4-6 weeks. Patient verbalizes understanding.    Destr of lesion - Right Medial Heel  Complexity: simple    Destruction method: cryotherapy    Informed consent: discussed and consent obtained    Lesion destroyed using liquid nitrogen: Yes    Outcome: patient tolerated procedure well with no complications    Post-procedure details: wound care instructions given      Related Procedures  Lipid panel  Follow Up In Dermatology - Established Patient    Related Medications  acitretin (Soriatane) 10 mg capsule  Take 1 capsule (10 mg) by mouth once daily.    fluorouracil (Efudex) 5 % cream  cream  Apply topically 2 times a day. Apply to the affected areas of the hands, left foot + lower leg, right foot + lower leg twice daily for a total of 2-3 weeks each. Treat each area separately.    2. Encounter for drug therapy    Related Procedures  Lipid panel    3. Psoriasis  Left Foot - Anterior, Right Foot - Anterior  Some thin well-demarcated plaques with thickening of skin on edges of feet    Body surface area:  2%   Joint abnormalities absent           Possibility of overlap of actinic damage and psoriasis  - acitretin would help with both of these entities  - cont with urea-containing cream for calluses and thickening of skin    4. Personal history of skin cancer    Personal History of Non-Melanoma Skin Cancer  - multiple    -no evidence of recurrence today though she has incompletely treated skin cancers in past  - a Full Skin Exam is recommended but she declines        Follow up 3 months actinic keratoses and possible psoriasis

## 2024-11-07 ENCOUNTER — APPOINTMENT (OUTPATIENT)
Dept: AUDIOLOGY | Facility: CLINIC | Age: 74
End: 2024-11-07
Payer: MEDICARE

## 2024-11-07 ENCOUNTER — APPOINTMENT (OUTPATIENT)
Dept: OTOLARYNGOLOGY | Facility: CLINIC | Age: 74
End: 2024-11-07
Payer: MEDICARE

## 2024-11-07 VITALS — HEIGHT: 62 IN | WEIGHT: 139 LBS | BODY MASS INDEX: 25.58 KG/M2

## 2024-11-07 DIAGNOSIS — H90.3 ASNHL (ASYMMETRICAL SENSORINEURAL HEARING LOSS): Primary | ICD-10-CM

## 2024-11-07 DIAGNOSIS — R42 DIZZINESS: ICD-10-CM

## 2024-11-07 DIAGNOSIS — H81.02 MENIERE'S DISEASE OF LEFT EAR: ICD-10-CM

## 2024-11-07 DIAGNOSIS — H93.12 TINNITUS OF LEFT EAR: ICD-10-CM

## 2024-11-07 DIAGNOSIS — H90.A22 SENSORINEURAL HEARING LOSS (SNHL) OF LEFT EAR WITH RESTRICTED HEARING OF RIGHT EAR: ICD-10-CM

## 2024-11-07 PROCEDURE — 99213 OFFICE O/P EST LOW 20 MIN: CPT | Performed by: OTOLARYNGOLOGY

## 2024-11-07 PROCEDURE — 3008F BODY MASS INDEX DOCD: CPT | Performed by: OTOLARYNGOLOGY

## 2024-11-07 PROCEDURE — 1036F TOBACCO NON-USER: CPT | Performed by: OTOLARYNGOLOGY

## 2024-11-07 PROCEDURE — 1160F RVW MEDS BY RX/DR IN RCRD: CPT | Performed by: OTOLARYNGOLOGY

## 2024-11-07 PROCEDURE — 1159F MED LIST DOCD IN RCRD: CPT | Performed by: OTOLARYNGOLOGY

## 2024-11-07 NOTE — PROGRESS NOTES
Subjective   Patient ID: Annabel Cottrell is a 74 y.o. female  HPI  Patient presents for follow-up for bilateral sensorineural hearing loss with asymmetry on the left side and intermittent dizziness and imbalance concerning for Ménière's disease.  She is feeling much better after following the low-salt diet.  Review of Systems    Objective   Physical Exam  The ear canals and tympanic membranes are clear and mobile.  There is no spontaneous nystagmus noted.  The MRI of the brain IACs was reviewed and noted to be normal.    Assessment/Plan   Diagnoses and all orders for this visit:  ASNHL (asymmetrical sensorineural hearing loss) (Primary)  -     Comprehensive hearing test; Future  Sensorineural hearing loss (SNHL) of left ear with restricted hearing of right ear  -     Comprehensive hearing test; Future  Tinnitus of left ear  Meniere's disease of left ear  Dizziness     Bilateral sensorineural hearing loss with asymmetry on the left side with progression of the hearing loss since 2021 and history of intermittent dizziness and imbalance with bilateral vestibular weakness more significantly on the left side noted on the VNG test and otherwise normal MRI of the brain IACs.  Her symptoms are most likely secondary to Ménière's disease and she did improve with a low-salt diet.  She was medically cleared for the use of hearing aids and she will follow-up in 6 months with a repeat hearing test.  She is already taking valsartan and hydrochlorothiazide 1 60-12.5 and the dose of the hydrochlorothiazide can be increased to 25 mg if the dizziness restarts again.  The total time spent during the encounter was 23 minutes of which at least 15 minutes were spent discussing Ménière's precautions.

## 2024-11-07 NOTE — PROGRESS NOTES
HEARING AID CHECK    Name:  Annabel Cottrell  :  1950  Age:  74 y.o.    HEARING AID INFORMATION:    Left Hearing Aid  More 1 mRITE-R  SN: [id number]  Warranty: 10/20/2024    SN: [id number]   Length  Left: 2 (85)  Dome/Earmold  Left: 8 mm bass double  Wax Filter  Prowax minifit  Battery Size  Rechargeable  TLC Expiration:  10/5/2025      HISTORY:    Patient seen today for check of the above devices. She reported that adjustments were recently made by another  audiologist when she had her hearing tested for her balance evaluation.     EXAM/PROCEDURES:    Visual inspection revealed no cerumen accumulation.  Cleaned and checked hearing aids.  Vacuumed ariel ports and  ports. Completed Redux dehumidification treatment for patient's hearing aids where <0.5 uL of moisture was removed during a 2:54 minute cycle. Replaced wax filters and domes. Final listening check indicated adequate sound quality and function with no distortion of internal feedback.     Connected devices to fitting software. Completed firmware update. In fitting assistant, decreased settings for own voice 1 step. Activated auto-acclimatization at level 1 set to increase over the next 4 weeks.     Patient also reported that creatinine holding charges 10.  Battery health showed some slight reduction.  Rechargeable battery was changed under warranty.    RECOMMENDATIONS AND FOLLOW-UP:    - Continue use of amplification and follow-up as recommended for hearing aid maintenance and adjustments.  - Recommended 6-month follow-up HAC. Patient to contact the office sooner if problems arise.  - Monitor and recheck hearing as warranted.  - Counseled regarding results and recommendations.    AGUSTO Sprague.  Audiology Student Extern    Alphonso Mosher  Clinical Audiologist

## 2024-11-13 ENCOUNTER — OFFICE VISIT (OUTPATIENT)
Dept: CARDIOLOGY | Facility: HOSPITAL | Age: 74
End: 2024-11-13
Payer: MEDICARE

## 2024-11-13 VITALS
OXYGEN SATURATION: 95 % | WEIGHT: 150.6 LBS | HEIGHT: 63 IN | BODY MASS INDEX: 26.68 KG/M2 | DIASTOLIC BLOOD PRESSURE: 72 MMHG | SYSTOLIC BLOOD PRESSURE: 118 MMHG | HEART RATE: 86 BPM

## 2024-11-13 DIAGNOSIS — E78.00 HYPERCHOLESTEROLEMIA: ICD-10-CM

## 2024-11-13 DIAGNOSIS — I10 BENIGN ESSENTIAL HYPERTENSION: ICD-10-CM

## 2024-11-13 DIAGNOSIS — I25.10 ATHEROSCLEROSIS OF NATIVE CORONARY ARTERY OF NATIVE HEART WITHOUT ANGINA PECTORIS: Primary | ICD-10-CM

## 2024-11-13 LAB
ATRIAL RATE: 86 BPM
P AXIS: 73 DEGREES
P OFFSET: 197 MS
P ONSET: 143 MS
PR INTERVAL: 140 MS
Q ONSET: 213 MS
QRS COUNT: 14 BEATS
QRS DURATION: 88 MS
QT INTERVAL: 386 MS
QTC CALCULATION(BAZETT): 461 MS
QTC FREDERICIA: 435 MS
R AXIS: 11 DEGREES
T AXIS: 69 DEGREES
T OFFSET: 406 MS
VENTRICULAR RATE: 86 BPM

## 2024-11-13 PROCEDURE — 93005 ELECTROCARDIOGRAM TRACING: CPT | Performed by: INTERNAL MEDICINE

## 2024-11-13 PROCEDURE — 99417 PROLNG OP E/M EACH 15 MIN: CPT | Performed by: INTERNAL MEDICINE

## 2024-11-13 PROCEDURE — 3074F SYST BP LT 130 MM HG: CPT | Performed by: INTERNAL MEDICINE

## 2024-11-13 PROCEDURE — 1160F RVW MEDS BY RX/DR IN RCRD: CPT | Performed by: INTERNAL MEDICINE

## 2024-11-13 PROCEDURE — 3008F BODY MASS INDEX DOCD: CPT | Performed by: INTERNAL MEDICINE

## 2024-11-13 PROCEDURE — 3078F DIAST BP <80 MM HG: CPT | Performed by: INTERNAL MEDICINE

## 2024-11-13 PROCEDURE — 99214 OFFICE O/P EST MOD 30 MIN: CPT | Performed by: INTERNAL MEDICINE

## 2024-11-13 PROCEDURE — 1159F MED LIST DOCD IN RCRD: CPT | Performed by: INTERNAL MEDICINE

## 2024-11-13 RX ORDER — EZETIMIBE 10 MG/1
10 TABLET ORAL DAILY
Qty: 90 TABLET | Refills: 3 | Status: SHIPPED | OUTPATIENT
Start: 2024-11-13 | End: 2025-11-13

## 2024-11-13 NOTE — PROGRESS NOTES
Primary Care Physician: Kenzie Veliz MD  Date of Visit: 11/13/2024 11:20 AM EST  Location of visit: [facility]     Chief Complaint:   Chief Complaint   Patient presents with    Follow-up        HPI / Summary:   Annabel Cottrell is a 74 y.o. female presents for followup.  She was diagnosed with Ménière's disease and has had intermittent vertigo for the last several months.  Her vertigo has improved.  Approximately 1 to 2 months ago she did have a transient syncopal episode.  During that time she had had frequent nausea and vomiting related to her vertigo.  She also had an upper respiratory infection and cough at that time.  When she was standing up in the shower washing her hair she became very lightheaded and had to sit down.  She sat down in the shower and leaned against the wall and had very brief loss of consciousness.  There was no injury.  She has had no further episodes of lightheadedness or near syncope.  She is able to form housework without chest pain or shortness of breath.  The patient denies chest pain, shortness of breath, palpitations, orthopnea, paroxysmal nocturnal dyspnea, lower extremity edema, or bleeding problems.        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    Malignant neoplasm of uterine fundus    Other abnormal and inconclusive findings on diagnostic imaging of breast 12/19/2019    Abnormal MRI, breast    Personal history of malignant neoplasm of thyroid  12/19/2019    History of malignant neoplasm of thyroid    Personal history of other malignant neoplasm of skin     History of other malignant neoplasm of skin    Personal history of other medical treatment 09/08/2017    History of screening mammography    Personal history of other specified conditions 12/06/2019    History of abnormal mammogram    Pneumonitis due to inhalation of food and vomit (Multi) 10/06/2016    Aspiration pneumonia of both upper lobes due to gastric secretions    Vaginal atrophy 04/04/2023        Past Surgical History:  Past Surgical History:   Procedure Laterality Date    CATARACT EXTRACTION Left 04/25/2024    Dr. Banda    CATARACT EXTRACTION W/  INTRAOCULAR LENS IMPLANT Right 05/30/2024    Dr. Banda Aim: -2.00 to -2.50    HYSTERECTOMY  08/03/2017    Hysterectomy    OTHER SURGICAL HISTORY  06/25/2020    Mastectomy partial    OTHER SURGICAL HISTORY  06/25/2020    [place] lymph node biopsy procedure    OTHER SURGICAL HISTORY  07/06/2020    Axillary lymphadenectomy    THYROID SURGERY  01/15/2014    Thyroid Surgery          Social History:   reports that she has never smoked. She has never used smokeless tobacco. She reports current alcohol use. She reports that she does not use drugs.     Family History:  family history includes Heart attack in her father and mother; cardiac arrhythmia in her sister.      Allergies:  Allergies   Allergen Reactions    Aloe Vera Latex Gloves [Gloves, Latex With Aloe Vera] Unknown     Pt is only allergic to LATEX    Codeine Unknown       Outpatient Medications:  Current Outpatient Medications   Medication Instructions    acitretin (SORIATANE) 10 mg, oral, Daily    calcium carbonate 600 mg calcium (1,500 mg) tablet 2 tablets, Daily    cholecalciferol (Vitamin D-3) 125 MCG (5000 UT) capsule 1 capsule, Daily    estrogens, conjugated, (Premarin) vaginal cream Apply pea-sized amount to the vaginal opening 3 times weekly as directed    fluorouracil (Efudex) 5 % cream  "cream Topical, 2 times daily, Apply to the affected areas of the hands, left foot + lower leg, right foot + lower leg twice daily for a total of 2-3 weeks each. Treat each area separately.    levothyroxine (SYNTHROID, LEVOXYL) 112 mcg, oral, Daily, as directed    magnesium gluconate (Magonate) 27.5 mg magne- sium (500 mg) tablet 1 tablet, Daily    meclizine (ANTIVERT) 25 mg, oral, 3 times daily PRN    methylPREDNISolone (Medrol Dospak) 4 mg tablets Follow schedule on package instructions    pantoprazole (PROTONIX) 40 mg, oral, Daily, Do not crush, chew, or split.    rosuvastatin (CRESTOR) 40 mg, oral, Nightly    tamoxifen (NOLVADEX) 20 mg, oral, Daily    valsartan-hydrochlorothiazide (Diovan-HCT) 160-12.5 mg tablet 1 tablet, Daily    venlafaxine XR (EFFEXOR-XR) 75 mg, oral, Daily    zinc gluconate 50 mg tablet 1 tablet, Daily       Physical Exam:  Vitals:    11/13/24 1136   BP: 118/72   BP Location: Right arm   Pulse: 86   SpO2: 95%   Weight: 68.3 kg (150 lb 9.6 oz)   Height: 1.588 m (5' 2.5\")       Wt Readings from Last 5 Encounters:   11/13/24 68.3 kg (150 lb 9.6 oz)   11/07/24 63 kg (139 lb)   10/22/24 63 kg (139 lb)   10/15/24 63 kg (139 lb)   09/12/24 63 kg (139 lb)     Body mass index is 27.11 kg/m².   General: Well-developed well-nourished in no acute distress  HEENT: Normocephalic atraumatic  Neck: Supple, JVP is normal negative hepatojugular reflux 2+ carotid pulses without bruit  Pulmonary: Normal respiratory effort, clear to auscultation  Cardiovascular: No right ventricular heave, normal S1 and S2, no murmurs rubs or gallops  Abdomen: Soft nontender nondistended  Extremities: Warm without edema 2+ radial pulses bilaterally   Neurologic: Alert and oriented x3  Psychiatric: Normal mood and affect     Last Labs:  CMP:  Recent Labs     10/31/24  1642 10/21/24  1428 09/04/24  0903 08/30/24  1250 08/21/24  1722 07/16/24  1220     --  143 139 135* 139   K 3.5  --  3.8 4.2 4.4 3.9     --  105 101 94* " "102   CO2 28  --  33* 27 29 27   ANIONGAP 15  --  9* 15 16 14   BUN 18 19 14 22 17 19   CREATININE 1.28* 1.49* 1.24* 1.44* 1.48* 1.23*   EGFR 44* 37* 46* 38* 37* 46*   GLUCOSE 113*  --  97 99 97 81     Recent Labs     10/31/24  1642 08/30/24  1250 08/21/24  1722 07/16/24  1220 01/29/24  1726 10/30/23  0942 10/30/23  0942 04/24/23  1008 04/05/23  1304 08/07/20  0624 08/06/20 2027   ALBUMIN 4.5 4.1 4.0 4.2 4.0   < > 4.2 4.2 4.3   < > 4.3   ALKPHOS  --  37 45  --   --   --  29* 26* 30*   < > 74   ALT  --  12 15  --   --   --  10 14 22   < > 20   AST  --  18 37  --   --   --  16 18 25   < > 19   BILITOT  --  0.4 0.4  --   --   --  0.4 0.5 0.4   < > 0.8   LIPASE  --   --  33  --   --   --   --   --   --   --  16    < > = values in this interval not displayed.     CBC:  Recent Labs     10/31/24  1642 08/30/24  1250 08/21/24  1722 10/30/23  0942 04/24/23  1008   WBC 8.0 9.0 15.8* 6.3 5.7   HGB 13.9 13.2 13.1 13.0 12.7   HCT 41.1 40.5 40.6 38.4 37.9    439 366 204 231   MCV 93 91 91 91 91     COAG:   Recent Labs     09/03/20  1304 08/12/20  1232 07/17/20  1255 05/20/20  1621   INR 1.0 1.0 1.0 1.0     HEME/ENDO:  Recent Labs     03/01/23  1123 12/23/22  1154 10/31/22  1133 09/15/22  1312   TSH 0.10* 1.74 2.66 14.48*   HGBA1C 5.6  --   --   --       CARDIAC: No results for input(s): \"LDH\", \"CKMB\", \"TROPHS\", \"BNP\" in the last 10761 hours.    No lab exists for component: \"CK\", \"CKMBP\"  Recent Labs     10/31/24  1642 11/14/23  1340 03/01/23  1123 09/15/22  1312 02/07/22  1340   CHOL 165 130 125 187 128   LDLF  --   --  51 86 56   LDLCALC 73 56  --   --   --    HDL 64.7 53.6 49.4 73.8 49.2   TRIG 138 102 121 137 116       Last Cardiology Tests:  ECG:  An electrocardiogram performed today that I reviewed shows normal sinus rhythm with a PVC and is otherwise normal.    Echo: February 16, 2022  CONCLUSIONS:   1. The left ventricular systolic function is normal with a 65-70% estimated ejection fraction.   2. Spectral Doppler " shows an impaired relaxation pattern of left ventricular diastolic filling.   3. The left ventricular cavity size is decreased.       Cath:      Stress Test:  Stress Results:  No results found for this or any previous visit from the past 365 days.         Cardiac Imaging:  CT angio for PE August 21, 2024   IMPRESSION:  1. No pulmonary emboli.  2. There are small patchy infiltrates in bilateral upper and lower  lobes which may represent pneumonia.  3. Mild bronchiectasis.    CT of the chest abdomen and pelvis August 6, 2020  HEART AND VESSELS:  No discrete filling defects within the main pulmonary artery or its  proximal branches to the segmental level. Evaluation of subsegmental  pulmonary arteries is limited by motion artifact.     Main pulmonary artery and its branches are normal in caliber.     The thoracic aorta is of normal course and caliber without vascular  calcifications.     Moderate coronary artery calcifications. Study is not optimized for  evaluation of coronary arteries.     The cardiac chambers are not enlarged.     No evidence of pericardial effusion.    VESSELS:  Scattered atherosclerotic calcifications of abdominal aorta and its  branches without focal aneurysmal dilatation.       Assessment/Plan   Diagnoses and all orders for this visit:  Atherosclerosis of native coronary artery of native heart without angina pectoris  Benign essential hypertension  -     ECG 12 lead (Clinic Performed)  Hypercholesterolemia  -     ezetimibe (Zetia) 10 mg tablet; Take 1 tablet (10 mg) by mouth once daily.  -     Lipid Panel; Future    In summary, Ms. Cottrell is a pleasant 74 year-old female with a past medical history significant for coronary atherosclerosis with a CT calcium score of 383 in 2016 with preserved LV function, hypertension, hyperlipidemia, chronic kidney disease, and a family history of coronary disease.  She is now asymptomatic from a cardiac perspective.  She did have brief orthostatic syncope while  standing up in the shower in the setting of dehydration.  She has had no further episodes.  I encouraged her to stay hydrated.  Her most recent lipid profile was not at goal.  I added ezetimibe 10 mg daily.  We will repeat a lipid profile in 3 months.  She should continue her other cardiovascular medications.  I will see her back in follow-up in 1 year.    Orders:  Orders Placed This Encounter   Procedures    Lipid Panel    ECG 12 lead (Clinic Performed)      Followup Appts:  Future Appointments   Date Time Provider Department Center   11/15/2024 11:30 AM Carmen Dorsey APRN-CNP [id number] [facility]   2/17/2025  2:45 PM Christine Lynn MD [id number] [facility]   5/20/2025  1:00 PM MD ENEIDA [email or web address] [place]   5/20/2025  1:30 PM Alphonso [id number] [place]   8/26/2025 10:15 AM Joselin Heath MD [id number] [facility]   9/11/2025  1:00 PM TIMA Murphy-CNP [id number] [facility]           ____________________________________________________________  Marcos Coulter MD  [facility]  [facility]

## 2024-11-13 NOTE — PATIENT INSTRUCTIONS
Stay hydrated.  Increase your physical activity.  Start ezetimibe 10 mg daily.  Repeat a fasting lipid profile in 3 months.  Follow-up in 1 year.

## 2024-11-14 NOTE — PROGRESS NOTES
Patient ID: Annabel Cottrell is a 74 y.o. female.  BREAST CANCER DIAGNOSIS:         Breast         AJCC Edition: 8th (AJCC), Diagnosis Date: Dec 2019, Stage(no match), ypT2 ypN3 cM0  G2     Treatment Synopsis:    BREAST CANCER DIAGNOSIS  ypT2 ypN3M0 ER/W[place]+, HER2- lobular breast cancer        Treatment History:    1. Abnormal screening mammogram on 11/5/2019 showed architectural distortion in central lateral left breast.  2. 12/6/2019, left diagnostic mammogam/US demonstrated 1.1 cm irregular hypoechoic mass in 2:30 position, 3 cm from nipple with slightly abnormal  axillary lymph node. Biopsy of left breast and axillary lymph node yielded invasive lobular carcinoma grade 2, LCIS,  ER >95%, W[place] >95%, HER2 negative with metastatic carcinoma involving left axillary lymph node. Tumor board rec breast MRI, preoperative hormonal therapy vs primary surgery.   3. Breast MRI on 12/17/2019 demonstrated known biopsy-proven left breast mass with adjacent non-mass enhancement measuring up to 2.2 x 1.5 cm and left axillary level 1 metastatic lymphadenopathy.  Indeterminate right breast enhancing mass.  4. Us-guided needle biopsy of right breast on 12/19/2019 showed a mass at 8:00 4 CM  from nipple -- intraductal papilloma with focal atypia.   5. Preoperative hormonal treatment with anastrozole initiated 1/9/20.  She did not tolerate this (edema) and was switched to  exemestane 2/19/20. On 3/16 she stopped exemestane for side effects. She took two doses of anastrozole 4/1 and 4/2 then stopped for edema.  Letrozole initiated 4/8/20. MammaPrint on core was Low Risk Luminal A.  6. Progression on breast imaging 5/20/20. Staging was negative other than a single sclerotic rib lesion. Left partial mastectomy and SLN on 6/11/20 showed 3 cm residual tumor with multiple close and positive margins, 4/4 positive SLN. Right excision of  area of intraductal papilloma showed no additional findings. Reexcision left breast and ALND 7/1/20 showed LCIS  in the breast and additional 4/4 positive ALN for total of 8/8 positive LNs.  7. Postop chemotherapy with Docetaxel/Cytoxan x 4-6 cycles and radiation planned. First Docetaxel/Cytoxan 7/31/20. Stopped after 1st cycle because of hospital admission for sepsis/febrile neutropenia despite pegfilgrastim. Repeat PET/CT negative 8/2020.  8. Adjuvant RT completed 11/6/20.   9. Adjuvant letrozole initiated 12/2020. Due to intolerable symptoms, she switched to tamoxifen in July 2021.  10. Adjuvant Zometa initiated 1/2021. Unable to complete 6th dose October 2023 due to CKD. 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), Other abnormal and inconclusive findings on diagnostic imaging of breast (12/19/2019), Personal history of malignant neoplasm of thyroid (12/19/2019), Personal history of other malignant neoplasm of skin, Personal history of other medical treatment (09/08/2017), Personal history of other specified conditions (12/06/2019), Pneumonitis due to inhalation of food and vomit (Multi) (10/06/2016), and Vaginal atrophy (04/04/2023).HTN  Hypercholesterolemia  CKD III   Anxiety, Depression   Drusen   Osteopenia  Vitamin D deficiency  Hypothyroidism  GERD    Surgical History: Thyroid surgery (01/15/2014); Hysterectomy (08/03/2017, Left partial mastectomy,   Social HistoryLanguage preferred: English and Romanian  Two daughter and four grandchildren. One daughter is a PCP physician  Exercise habits: Plays tennis. Active.   and lives with  Luis Miguel who works in  construction business   Never smoker  Social drinker  Work history: homemaker  Social Substance History:  ·  Social History denies smoking, alcohol and drug use   ·  Smoking Status never smoker (1)   ·  Additional History       FamHx:   Grandfather: tongue cancer (heavy smoker)    Positive with myocardial infarction and cardiac arrhythmia     ObGyn Hx:  Menarche at age 12   with first birth at 25  menopausal at age 48 with Premarin usage for severe postmenopausal symptoms. 21 years of HRT and stopped in Dec 2019 after Dx of BrCa.    Family History:    Family History   Problem Relation Name Age of Onset    Heart attack Mother      Heart attack Father      Other (cardiac arrhythmia) Sister       Family Oncology History:  Cancer-related family history is not on file.    HISTORY OF PRESENT ILLNESS:  Annabel Cottrell is a 74 y.o. female who returns today for Breast cancer surveillance and follow-up. She is accompanied by her  today. Today I have reviewed with the patient I will be conducting a clinical physical breast exam. Patient has declined a second medical professional today during the exam as a chapperone.     She continues compliant on Tamoxifen- she is tolerating this well and reports stable on and off hot flashes. She has no breast cancer concerns today.      She denies any chest pain or breathing issues, no cough, no sob. She reports a resolved chest cold one month ago.      She reports 2 months of issues with vertigo that was worked up with PCP and did finally resolve with steroids + hearing aide. She reports resolve of ringing in ear now with the aide on the left side. She denies any headache issues, or loss of balance, no falls. She has baseline issues with vision, had cataract surgery in .     She denies any new or unexplained bone aches or pains.    She denies any skin lesions or masses, oral sores lesions or infections. She see derm regularly for dermatology- recently had hand and forearm  "lesions treated.       She reports a normal appetite but works to eat less.  She reports continued stable constipation uses dietary changes.  She denies issues with urination.      She denies any issues with sleep, denies fatigue.       Review of Systems - Oncology  ROS 14 points performed, See HPI for exceptions    OBJECTIVE:    VS / Pain:  /75   Pulse 86   Temp 36.6 °C (97.9 °F) (Temporal)   Resp 18   Ht 1.588 m (5' 2.52\")   Wt 68.2 kg (150 lb 5.7 oz)   BMI 27.04 kg/m²   BSA: 1.73 meters squared   Pain Scale: 0  ECO- Fully active, able to carry on all pre-disease performance w/o restriction.       Physical Exam  Constitutional: Well developed, awake/alert/oriented x4, no distress, alert and cooperative  EYES: Sclera clear  ENMT: mucous membranes moist, no apparent injury, no lesions seen  Head/Neck: Neck supple, no apparent injury, thyroid without mass or tenderness, No JVD, trachea midline, no bruits  Respiratory / Thoracic: Patent airways, clear to all lobes, normal breath sounds with good chest expansion, thorax symmetric.  Cardiovascular: Regular, rate and rhythm, no murmurs, 2+ equal pulses of the extremities, normal auscultated S 1and S 2  GI: Nondistended, soft, non-tender, no rebound tenderness or guarding, no masses palpable, no organomegaly, +BS, no bruits  Musculoskeletal: ROM intact, no joint swelling, normal strength, no spinal tenderness  Extremities: normal extremities, no cyanosis edema, contusions or wounds, no clubbing  Neurological: alert and oriented x4, intact senses, motor, response and reflexes, normal strength  Breast: s/p left partial mastectomy and radiation therapy. Bilateral breasts free of any palpable masses or lesions, + baseline tissue thickening effect of radiation therapy to left breast.   Lymphatic: No cervical, supraclavicular, infraclavicular or axillary lymphadenopathy  Psychological: Appropriate and talkative mood and behavior  Skin: Warm and dry, no lesions, " no rashes, no jaundice    Diagnostic Results      Narrative & Impression  Interpreted By:  Elke Solano and Hanreck James   STUDY:  BI MAMMO BILATERAL SCREENING TOMOSYNTHESIS;  5/10/2024 9:53 am      ACCESSION NUMBER(S):  [id number]      ORDERING CLINICIAN:  RAQUEL ROJAS      INDICATION:  Screening. Status post left lumpectomy.      COMPARISON:  04/24/2023 and 04/20/2022 mammograms      FINDINGS:  2D and tomosynthesis images were reviewed at 1 mm slice thickness.      Density:  The breast tissue is heterogeneously dense, which may  obscure small masses.      Stable lumpectomy changes are seen in the deep upper outer left  breast and postsurgical scarring in the right breast are again seen.  No suspicious masses or calcifications are identified.      IMPRESSION:  No mammographic evidence of malignancy.      BI-RADS CATEGORY:      BI-RADS Category:  2 Benign.  Recommendation:  Annual Screening.  Recommended Date:  1 Year.  Laterality:  Bilateral.      For any future breast imaging appointments, please call [phone] ([phone]).  I personally reviewed the images/study and I agree with the resident  Aime Chandler's findings as stated. This study was interpreted  at [place], [place].      MACRO:  None      Signed by: Elke Solano 5/10/2024 10:19 AM        === 07/01/24 ===    DEXA BONE DENSITY    - Impression -  DEXA:  According to World Health Organization criteria,  classification is normal.    Followup recommended in 2 years or sooner as clinically warranted.    All images and detailed analysis are available on the  Radiology  PACS.    MACRO:  None    Signed by: Lisa Brown 7/1/2024 12:47 PM  Dictation workstation:   [id number]    Lab Results   Component Value Date    WBC 8.0 10/31/2024    HGB 13.9 10/31/2024    HCT 41.1 10/31/2024    MCV 93 10/31/2024     10/31/2024       Chemistry    Lab Results   Component Value Date/Time     10/31/2024  1642    K 3.5 10/31/2024 1642     10/31/2024 1642    CO2 28 10/31/2024 1642    BUN 18 10/31/2024 1642    CREATININE 1.28 (H) 10/31/2024 1642    Lab Results   Component Value Date/Time    CALCIUM 9.5 10/31/2024 1642    ALKPHOS 37 08/30/2024 1250    AST 18 08/30/2024 1250    ALT 12 08/30/2024 1250    BILITOT 0.4 08/30/2024 1250           Assessment/Plan   Carcinoma of left breast metastatic to axillary lymph node (Multi), Clinical: Stage IIIB (cT2, cN3, cM0, G2, ER+, [place]+, HER2-)  Annabel was seen today for follow-up.  Diagnoses and all orders for this visit:  Menopause  -     Clinic Appointment Request Follow up; RAQUEL ROJAS  -     Clinic Appointment Request Follow up; RAQUEL ROJAS; Future  Encounter for monitoring tamoxifen therapy  -     Clinic Appointment Request Follow up; RAQUEL ROJAS R  -     BI mammo bilateral screening tomosynthesis; Future  -     Clinic Appointment Request Follow up; RAQULE ROJAS; Future  Carcinoma of left breast metastatic to axillary lymph node (Multi)  -     Clinic Appointment Request Follow up; RAQUEL ROJAS R  -     BI mammo bilateral screening tomosynthesis; Future  -     Clinic Appointment Request Follow up; RAQUEL ROJAS; Future  Osteopenia, unspecified location  -     Clinic Appointment Request Follow up; RAQUEL ROJAS R  -     BI mammo bilateral screening tomosynthesis; Future  -     Clinic Appointment Request Follow up; RAQUEL ROJAS; Future  Encounter for screening mammogram for malignant neoplasm of breast  -     BI mammo bilateral screening tomosynthesis; Future      ypT2 ypN3 cM0 ER/[place]+, HER2- lobular breast cancer (left). Initially treated with neoadjuvant endocrine therapy with clinical progression. She  is s/p lumpectomy and ALND on 7/1/20. She was unable to tolerate adjuvant chemotherapy (received TC x1 cycle), but completed radiation therapy.  She was initiated on adjuvant letrozole in 12/2020; adjuvant bisphosphonates  were also pursued. Due to grade  2-3 arthralgias, she was switched to tamoxifen in 7/2021.     - She is tolerating tamoxifen very well overall. Continue 20 mg daily, goal 10 years of therapy.     - Grade 1 hot flashes.  duloxetine has been discontinued, stable at this time      There is no evidence on clinical breast exam today for breast cancer recurrence. RTC in 6 months with annual mammogram     Osteopenia.   DEXA 4/2022- normal results, repeat July 2024 continued normal density.   - Continue calcium and vitamin D supplementation.  - Zometa, 6th was due Fall 2023, however worsening of CrCl 30. Discontinuation of Zometa, is established with Nephrology for CKD    General Health Maintenance / Hypothyroidism. Follows with PCP Dr Kenzie Meng.   She is established with Nephrology for CKD that is HTN provoked     H/o endometrial cancer. S/p hysterectomy and BSO.    At least 35 minutes of direct consultation was spent with the patient today reviewing her cancer care plan, educating and answering questions regarding ongoing follow up, greater than 50% in counseling and coordination of care        Treatment Plan:  [No matching plan found]        Thank you for the opportunity to be involved in the care of Annabel Cottrell.   We discussed the clinical significance of diagnosis, goals of care and treatment plan in detail.   Please do not hesitate to reach out with any questions. Thank you.     -------------------------------------------------------------------------------------------------------------------------------  Carmen Dorsey MSN, APRN, FNP-C  [facility] [place]  Division of Medical Oncology- Breast   Collaborating Physician Dr. Nehemias Lopez   [facility] [place] [address]  Phone: [phone]  Fax: [phone]  Available via Secure Chat    Confidential Peer Review Document-   Privilege  Privileged Pursuant to [place] Revised Code Section 2305.24, .25, .251 & .252

## 2024-11-15 ENCOUNTER — OFFICE VISIT (OUTPATIENT)
Dept: HEMATOLOGY/ONCOLOGY | Facility: HOSPITAL | Age: 74
End: 2024-11-15
Payer: MEDICARE

## 2024-11-15 VITALS
HEART RATE: 86 BPM | TEMPERATURE: 97.9 F | RESPIRATION RATE: 18 BRPM | BODY MASS INDEX: 26.64 KG/M2 | DIASTOLIC BLOOD PRESSURE: 75 MMHG | WEIGHT: 150.35 LBS | SYSTOLIC BLOOD PRESSURE: 134 MMHG | HEIGHT: 63 IN

## 2024-11-15 DIAGNOSIS — Z12.31 ENCOUNTER FOR SCREENING MAMMOGRAM FOR MALIGNANT NEOPLASM OF BREAST: ICD-10-CM

## 2024-11-15 DIAGNOSIS — C77.3 CARCINOMA OF LEFT BREAST METASTATIC TO AXILLARY LYMPH NODE (MULTI): ICD-10-CM

## 2024-11-15 DIAGNOSIS — Z78.0 MENOPAUSE: ICD-10-CM

## 2024-11-15 DIAGNOSIS — Z51.81 ENCOUNTER FOR MONITORING TAMOXIFEN THERAPY: ICD-10-CM

## 2024-11-15 DIAGNOSIS — Z79.810 ENCOUNTER FOR MONITORING TAMOXIFEN THERAPY: ICD-10-CM

## 2024-11-15 DIAGNOSIS — C50.912 CARCINOMA OF LEFT BREAST METASTATIC TO AXILLARY LYMPH NODE (MULTI): ICD-10-CM

## 2024-11-15 DIAGNOSIS — M85.80 OSTEOPENIA, UNSPECIFIED LOCATION: ICD-10-CM

## 2024-11-15 PROCEDURE — 1159F MED LIST DOCD IN RCRD: CPT | Performed by: NURSE PRACTITIONER

## 2024-11-15 PROCEDURE — 1126F AMNT PAIN NOTED NONE PRSNT: CPT | Performed by: NURSE PRACTITIONER

## 2024-11-15 PROCEDURE — 1160F RVW MEDS BY RX/DR IN RCRD: CPT | Performed by: NURSE PRACTITIONER

## 2024-11-15 PROCEDURE — 3075F SYST BP GE 130 - 139MM HG: CPT | Performed by: NURSE PRACTITIONER

## 2024-11-15 PROCEDURE — 3078F DIAST BP <80 MM HG: CPT | Performed by: NURSE PRACTITIONER

## 2024-11-15 PROCEDURE — 99215 OFFICE O/P EST HI 40 MIN: CPT | Performed by: NURSE PRACTITIONER

## 2024-11-15 PROCEDURE — 3008F BODY MASS INDEX DOCD: CPT | Performed by: NURSE PRACTITIONER

## 2024-11-15 ASSESSMENT — PAIN SCALES - GENERAL: PAINLEVEL_OUTOF10: 0-NO PAIN

## 2024-11-15 NOTE — PATIENT INSTRUCTIONS
April 2022 and July 2024 bone density were normal. Moving forward PCP will manage bone density monitoring      Mammogram May 2024 was normal. Next due AFTER 5/9/24, same day as follow up with luann ALFRED CNP     Radiation oncology follow up 9/11/25 [facility]     PCP Dr Meng annually and as scheduled      Call with any questions, concerns or treatment intolerance prior to next office visit [phone].  
n/a

## 2024-11-26 DIAGNOSIS — K21.9 GASTROESOPHAGEAL REFLUX DISEASE WITHOUT ESOPHAGITIS: ICD-10-CM

## 2024-11-26 RX ORDER — PANTOPRAZOLE SODIUM 40 MG/1
40 TABLET, DELAYED RELEASE ORAL DAILY
Qty: 90 TABLET | Refills: 3 | Status: SHIPPED | OUTPATIENT
Start: 2024-11-26

## 2025-01-06 ENCOUNTER — HOSPITAL ENCOUNTER (OUTPATIENT)
Dept: RADIOLOGY | Facility: HOSPITAL | Age: 75
Discharge: HOME | End: 2025-01-06
Payer: MEDICARE

## 2025-01-06 VITALS — WEIGHT: 150.35 LBS | BODY MASS INDEX: 27.04 KG/M2

## 2025-01-06 DIAGNOSIS — D30.00 BENIGN NEOPLASM OF UNSPECIFIED KIDNEY: ICD-10-CM

## 2025-01-06 PROCEDURE — 74181 MRI ABDOMEN W/O CONTRAST: CPT | Performed by: RADIOLOGY

## 2025-01-06 PROCEDURE — 74181 MRI ABDOMEN W/O CONTRAST: CPT

## 2025-01-30 ENCOUNTER — TELEPHONE (OUTPATIENT)
Dept: PRIMARY CARE | Facility: CLINIC | Age: 75
End: 2025-01-30
Payer: MEDICARE

## 2025-01-31 DIAGNOSIS — L30.9 DERMATITIS: Primary | ICD-10-CM

## 2025-01-31 RX ORDER — CLOTRIMAZOLE AND BETAMETHASONE DIPROPIONATE 10; .64 MG/G; MG/G
1 CREAM TOPICAL 2 TIMES DAILY
Qty: 15 G | Refills: 0 | Status: SHIPPED | OUTPATIENT
Start: 2025-01-31

## 2025-02-07 DIAGNOSIS — L40.9 PSORIASIS: ICD-10-CM

## 2025-02-07 DIAGNOSIS — I10 BENIGN ESSENTIAL HYPERTENSION: Primary | ICD-10-CM

## 2025-02-08 RX ORDER — VALSARTAN AND HYDROCHLOROTHIAZIDE 160; 12.5 MG/1; MG/1
1 TABLET, FILM COATED ORAL DAILY
Qty: 90 TABLET | Refills: 3 | Status: SHIPPED | OUTPATIENT
Start: 2025-02-08 | End: 2026-02-08

## 2025-02-08 RX ORDER — ACITRETIN 10 MG/1
10 CAPSULE ORAL
Qty: 30 CAPSULE | Refills: 0 | Status: SHIPPED | OUTPATIENT
Start: 2025-02-08

## 2025-02-26 ENCOUNTER — TELEPHONE (OUTPATIENT)
Dept: DERMATOLOGY | Facility: CLINIC | Age: 75
End: 2025-02-26
Payer: MEDICARE

## 2025-03-03 ASSESSMENT — DERMATOLOGY QUALITY OF LIFE (QOL) ASSESSMENT
RATE HOW EMOTIONALLY BOTHERED YOU ARE BY YOUR SKIN PROBLEM (FOR EXAMPLE, WORRY, EMBARRASSMENT, FRUSTRATION): 0 - NEVER BOTHERED
RATE HOW BOTHERED YOU ARE BY EFFECTS OF YOUR SKIN PROBLEMS ON YOUR ACTIVITIES (EG, GOING OUT, ACCOMPLISHING WHAT YOU WANT, WORK ACTIVITIES OR YOUR RELATIONSHIPS WITH OTHERS): 6 - ALWAYS BOTHERED
RATE HOW BOTHERED YOU ARE BY SYMPTOMS OF YOUR SKIN PROBLEM (EG, ITCHING, STINGING BURNING, HURTING OR SKIN IRRITATION): 0 - NEVER BOTHERED
RATE HOW BOTHERED YOU ARE BY SYMPTOMS OF YOUR SKIN PROBLEM (EG, ITCHING, STINGING BURNING, HURTING OR SKIN IRRITATION): 0 - NEVER BOTHERED
RATE HOW BOTHERED YOU ARE BY EFFECTS OF YOUR SKIN PROBLEMS ON YOUR ACTIVITIES (EG, GOING OUT, ACCOMPLISHING WHAT YOU WANT, WORK ACTIVITIES OR YOUR RELATIONSHIPS WITH OTHERS): 6 - ALWAYS BOTHERED
RATE HOW EMOTIONALLY BOTHERED YOU ARE BY YOUR SKIN PROBLEM (FOR EXAMPLE, WORRY, EMBARRASSMENT, FRUSTRATION): 0 - NEVER BOTHERED

## 2025-03-04 ENCOUNTER — APPOINTMENT (OUTPATIENT)
Dept: DERMATOLOGY | Facility: CLINIC | Age: 75
End: 2025-03-04
Payer: MEDICARE

## 2025-03-04 DIAGNOSIS — Z85.828 PERSONAL HISTORY OF SKIN CANCER: ICD-10-CM

## 2025-03-04 DIAGNOSIS — L82.1 SEBORRHEIC KERATOSIS: ICD-10-CM

## 2025-03-04 DIAGNOSIS — L57.0 ACTINIC KERATOSIS: ICD-10-CM

## 2025-03-04 DIAGNOSIS — Z51.81 ENCOUNTER FOR THERAPEUTIC DRUG LEVEL MONITORING: ICD-10-CM

## 2025-03-04 DIAGNOSIS — D22.9 MULTIPLE BENIGN NEVI: Primary | ICD-10-CM

## 2025-03-04 DIAGNOSIS — Z12.83 SCREENING EXAM FOR SKIN CANCER: ICD-10-CM

## 2025-03-04 DIAGNOSIS — L57.8 ACTINIC SKIN DAMAGE: ICD-10-CM

## 2025-03-04 DIAGNOSIS — L81.4 LENTIGO: ICD-10-CM

## 2025-03-04 DIAGNOSIS — E78.1 HYPERTRIGLYCERIDEMIA: ICD-10-CM

## 2025-03-04 PROCEDURE — 1036F TOBACCO NON-USER: CPT | Performed by: DERMATOLOGY

## 2025-03-04 PROCEDURE — 17003 DESTRUCT PREMALG LES 2-14: CPT | Performed by: DERMATOLOGY

## 2025-03-04 PROCEDURE — 17000 DESTRUCT PREMALG LESION: CPT | Performed by: DERMATOLOGY

## 2025-03-04 PROCEDURE — 1159F MED LIST DOCD IN RCRD: CPT | Performed by: DERMATOLOGY

## 2025-03-04 PROCEDURE — 1160F RVW MEDS BY RX/DR IN RCRD: CPT | Performed by: DERMATOLOGY

## 2025-03-04 PROCEDURE — 99214 OFFICE O/P EST MOD 30 MIN: CPT | Performed by: DERMATOLOGY

## 2025-03-04 ASSESSMENT — DERMATOLOGY PATIENT ASSESSMENT
DO YOU HAVE ANY NEW OR CHANGING LESIONS: NO
HAVE YOU HAD OR DO YOU HAVE VASCULAR DISEASE: NO
DO YOU USE A TANNING BED: NO
DO YOU HAVE IRREGULAR MENSTRUAL CYCLES: NO
HAVE YOU HAD OR DO YOU HAVE A STAPH INFECTION: NO
ARE YOU ON BIRTH CONTROL: NO
ARE YOU AN ORGAN TRANSPLANT RECIPIENT: NO
DO YOU USE SUNSCREEN: OCCASIONALLY
ARE YOU TRYING TO GET PREGNANT: NO

## 2025-03-04 ASSESSMENT — ITCH NUMERIC RATING SCALE: HOW SEVERE IS YOUR ITCHING?: 5

## 2025-03-04 ASSESSMENT — DERMATOLOGY QUALITY OF LIFE (QOL) ASSESSMENT
DATE THE QUALITY-OF-LIFE ASSESSMENT WAS COMPLETED: 67268
RATE HOW BOTHERED YOU ARE BY SYMPTOMS OF YOUR SKIN PROBLEM (EG, ITCHING, STINGING BURNING, HURTING OR SKIN IRRITATION): 1
RATE HOW EMOTIONALLY BOTHERED YOU ARE BY YOUR SKIN PROBLEM (FOR EXAMPLE, WORRY, EMBARRASSMENT, FRUSTRATION): 1
RATE HOW BOTHERED YOU ARE BY EFFECTS OF YOUR SKIN PROBLEMS ON YOUR ACTIVITIES (EG, GOING OUT, ACCOMPLISHING WHAT YOU WANT, WORK ACTIVITIES OR YOUR RELATIONSHIPS WITH OTHERS): 1
ARE THERE EXCLUSIONS OR EXCEPTIONS FOR THE QUALITY OF LIFE ASSESSMENT: NO

## 2025-03-04 ASSESSMENT — PATIENT GLOBAL ASSESSMENT (PGA): PATIENT GLOBAL ASSESSMENT: PATIENT GLOBAL ASSESSMENT:  1 - CLEAR

## 2025-03-04 NOTE — PROGRESS NOTES
Subjective     Annabel Cottrell is a 74 y.o. female who presents for the following: Suspicious Skin Lesion (abdomen). Last derm visit 11/4/24 for actinic keratosis, psoriasis.     Review of Systems:  No other skin or systemic complaints other than what is documented elsewhere in the note.    The following portions of the chart were reviewed this encounter and updated as appropriate:   Tobacco  Allergies  Meds  Problems  Med Hx  Surg Hx         Skin Cancer History  No skin cancer on file.      Specialty Problems          Dermatology Problems    History of nonmelanoma skin cancer     Lesion 1: Nodular and Infiltrative Basal Cell Carcinoma.  Deep margins involved.Year Diagnosed:  2022. August.Location:  Left Neck.Treatment(s): Mohs 10/2022 [facility]: [id number]    Lesion 2: Nodular Basal Cell Carcinoma.  Deep margins involved.Year Diagnosed:  2022. August.Location:  Left Upper Arm.Treatment(s): ED&C 10/2022 [facility] [id number]    Lesion 3: Superficial Basal Cell Carcinoma.Year Diagnosed:  2022. August.Location:  Right Shin.Treatment(s): 5-FU, start 1/2023Pathology: [id number]    Lesion 4: Invasive squamous cell carcinoma.    Deep margins involved.Year Diagnosed:  2023. January.Location:  Right Anterior TrapeziusTreatment(s): **Pt cancelled surgery with derm surg, thought it was completely healed/resolved**Pathology:      7/2023. 2 skin lesions were biopsied from left lateral ankle superior that demonstrated squamous cell carcinoma in situ and from left lateral ankle inferior that demonstrated squamous cell carcinoma in situ. Treated with 5-fluorouracil cream but admitted to inconsistent use**             Objective   Well appearing patient in no apparent distress; mood and affect are within normal limits.    A focused skin examination was performed. All findings within normal limits unless otherwise noted below.    Assessment/Plan   1. Multiple benign nevi  Brown and tan macules and papules with  reassuring findings on dermoscopy    -These lesions have benign, reassuring patterns on dermoscopy  -Recommend continued self observation, and to contact the office if any changes in nevi are noticed    2. Lentigo  Tan macules    -Benign appearing on exam  -Reassurance, recommend observation    3. Seborrheic keratosis (5)  Generalized, Left Abdomen (side) - Lower, Left Abdomen (side) - Upper, Right Abdomen (side) - Lower, Right Abdomen (side) - Upper  Stuck on, waxy macule(s)/papule(s)/plaque(s) with comedo-like openings and milia like cysts    -Discussed the nature of the diagnosis  -Reassurance, recommend continued observation    4. Actinic skin damage  Background of photodamage with hyper- and hypo-pigmented macules on the skin    5. Personal history of skin cancer  Numerous squamous cell carcinoma and actinic keratoses   - - acitretin started 11/2024, tolerating well      Personal History of Non-Melanoma Skin Cancer  -possible recurrence/incomplete treatment on left lateral ankle. She declines proper evaluation today and does not want further treatment  - we have a visit scheduled for July and she will consider whther she will consent to a skin cancer screen and skin cancer treatments at that time    Related Procedures  CBC and Auto Differential  Comprehensive metabolic panel  Lipid panel    6. Screening exam for skin cancer    Upper body skin exam  -No lesions concerning for malignancy on the remainder the skin exam today   - The ugly duckling sign was discussed. Monitor for any skin lesions that are different in color, shape, or size than others on body  -Sun protection was discussed. Recommend SPF 30+, hats with brims, sun protective clothing, and avoiding sun exposure between 10 AM and 2 PM whenever possible  -Recommend regular skin exams or sooner if new or changing lesions       7. Actinic keratosis (3)  Left Shoulder - Anterior, Right Breast (2)  Erythematous scaly macule(s)    -Discussed nature of  diagnosis and treatment options.   - three largest treated with liquid nitrogen today  - acitretin started 11/2024, tolerating well  - need to re-check labs, may consider increasing dose    -Patient wishes to proceed with Cryotherapy today  -Possible side effects of liquid nitrogen treatment reviewed including formation of blisters, crusting, tenderness, scar, and discoloration which may be permanent.  -Patient advised to return the office for re-evaluation if the treated lesion(s) do not resolve within 4-6 weeks. Patient verbalizes understanding.    Destr of lesion - Left Shoulder - Anterior, Right Breast (2)  Complexity: simple    Destruction method: cryotherapy    Informed consent: discussed and consent obtained    Lesion destroyed using liquid nitrogen: Yes    Outcome: patient tolerated procedure well with no complications    Post-procedure details: wound care instructions given      Related Procedures  CBC and Auto Differential  Comprehensive metabolic panel  Lipid panel  Follow Up In Dermatology - Established Patient    Related Medications  acitretin (Soriatane) 10 mg capsule  Take 1 capsule (10 mg) by mouth once daily.    fluorouracil (Efudex) 5 % cream cream  Apply topically 2 times a day. Apply to the affected areas of the hands, left foot + lower leg, right foot + lower leg twice daily for a total of 2-3 weeks each. Treat each area separately.    8. Encounter for therapeutic drug level monitoring    Related Procedures  CBC and Auto Differential  Comprehensive metabolic panel  Lipid panel    9. Hypertriglyceridemia    Related Procedures  Lipid panel        Follow up 3 months actinic keratoses   Follow up 6 months Full Skin Exam

## 2025-03-14 LAB
ALBUMIN SERPL-MCNC: 4.2 G/DL (ref 3.6–5.1)
ALP SERPL-CCNC: 31 U/L (ref 37–153)
ALT SERPL-CCNC: 16 U/L (ref 6–29)
ANION GAP SERPL CALCULATED.4IONS-SCNC: 10 MMOL/L (CALC) (ref 7–17)
AST SERPL-CCNC: 22 U/L (ref 10–35)
BASOPHILS # BLD AUTO: 60 CELLS/UL (ref 0–200)
BASOPHILS NFR BLD AUTO: 0.8 %
BILIRUB SERPL-MCNC: 0.5 MG/DL (ref 0.2–1.2)
BUN SERPL-MCNC: 20 MG/DL (ref 7–25)
CALCIUM SERPL-MCNC: 9.2 MG/DL (ref 8.6–10.4)
CHLORIDE SERPL-SCNC: 102 MMOL/L (ref 98–110)
CHOLEST SERPL-MCNC: 126 MG/DL
CHOLEST/HDLC SERPL: 2.1 (CALC)
CO2 SERPL-SCNC: 28 MMOL/L (ref 20–32)
CREAT SERPL-MCNC: 1.31 MG/DL (ref 0.6–1)
EGFRCR SERPLBLD CKD-EPI 2021: 43 ML/MIN/1.73M2
EOSINOPHIL # BLD AUTO: 113 CELLS/UL (ref 15–500)
EOSINOPHIL NFR BLD AUTO: 1.5 %
ERYTHROCYTE [DISTWIDTH] IN BLOOD BY AUTOMATED COUNT: 13.2 % (ref 11–15)
GLUCOSE SERPL-MCNC: 90 MG/DL (ref 65–99)
HCT VFR BLD AUTO: 39.2 % (ref 35–45)
HDLC SERPL-MCNC: 60 MG/DL
HGB BLD-MCNC: 12.8 G/DL (ref 11.7–15.5)
LDLC SERPL CALC-MCNC: 46 MG/DL (CALC)
LYMPHOCYTES # BLD AUTO: 2573 CELLS/UL (ref 850–3900)
LYMPHOCYTES NFR BLD AUTO: 34.3 %
MCH RBC QN AUTO: 29.6 PG (ref 27–33)
MCHC RBC AUTO-ENTMCNC: 32.7 G/DL (ref 32–36)
MCV RBC AUTO: 90.5 FL (ref 80–100)
MONOCYTES # BLD AUTO: 593 CELLS/UL (ref 200–950)
MONOCYTES NFR BLD AUTO: 7.9 %
NEUTROPHILS # BLD AUTO: 4163 CELLS/UL (ref 1500–7800)
NEUTROPHILS NFR BLD AUTO: 55.5 %
NONHDLC SERPL-MCNC: 66 MG/DL (CALC)
PLATELET # BLD AUTO: 259 THOUSAND/UL (ref 140–400)
PMV BLD REES-ECKER: 10.6 FL (ref 7.5–12.5)
POTASSIUM SERPL-SCNC: 4 MMOL/L (ref 3.5–5.3)
PROT SERPL-MCNC: 6.9 G/DL (ref 6.1–8.1)
RBC # BLD AUTO: 4.33 MILLION/UL (ref 3.8–5.1)
SODIUM SERPL-SCNC: 140 MMOL/L (ref 135–146)
TRIGL SERPL-MCNC: 113 MG/DL
WBC # BLD AUTO: 7.5 THOUSAND/UL (ref 3.8–10.8)

## 2025-03-22 LAB
25(OH)D3+25(OH)D2 SERPL-MCNC: 61 NG/ML (ref 30–100)
ALBUMIN SERPL-MCNC: 4.3 G/DL (ref 3.6–5.1)
ALBUMIN/CREAT UR: 3 MG/G CREAT
APPEARANCE UR: CLEAR
BACTERIA #/AREA URNS HPF: ABNORMAL /HPF
BILIRUB UR QL STRIP: NEGATIVE
BUN SERPL-MCNC: 18 MG/DL (ref 7–25)
BUN/CREAT SERPL: 14 (CALC) (ref 6–22)
CALCIUM SERPL-MCNC: 9.6 MG/DL (ref 8.6–10.4)
CALCIUM SERPL-MCNC: 9.6 MG/DL (ref 8.6–10.4)
CHLORIDE SERPL-SCNC: 103 MMOL/L (ref 98–110)
CO2 SERPL-SCNC: 30 MMOL/L (ref 20–32)
COLOR UR: YELLOW
CREAT SERPL-MCNC: 1.3 MG/DL (ref 0.6–1)
CREAT UR-MCNC: 89 MG/DL (ref 20–275)
CREAT UR-MCNC: 89 MG/DL (ref 20–275)
EGFRCR SERPLBLD CKD-EPI 2021: 43 ML/MIN/1.73M2
ERYTHROCYTE [DISTWIDTH] IN BLOOD BY AUTOMATED COUNT: 13.5 % (ref 11–15)
GLUCOSE SERPL-MCNC: 94 MG/DL (ref 65–99)
GLUCOSE UR QL STRIP: NEGATIVE
HCT VFR BLD AUTO: 39.1 % (ref 35–45)
HGB BLD-MCNC: 12.6 G/DL (ref 11.7–15.5)
HGB UR QL STRIP: NEGATIVE
HYALINE CASTS #/AREA URNS LPF: ABNORMAL /LPF
KETONES UR QL STRIP: NEGATIVE
LEUKOCYTE ESTERASE UR QL STRIP: ABNORMAL
MCH RBC QN AUTO: 29.1 PG (ref 27–33)
MCHC RBC AUTO-ENTMCNC: 32.2 G/DL (ref 32–36)
MCV RBC AUTO: 90.3 FL (ref 80–100)
MICROALBUMIN UR-MCNC: 0.3 MG/DL
NITRITE UR QL STRIP: NEGATIVE
PH UR STRIP: 7 [PH] (ref 5–8)
PHOSPHATE SERPL-MCNC: 4.4 MG/DL (ref 2.1–4.3)
PLATELET # BLD AUTO: 228 THOUSAND/UL (ref 140–400)
PMV BLD REES-ECKER: 10.4 FL (ref 7.5–12.5)
POTASSIUM SERPL-SCNC: 4.4 MMOL/L (ref 3.5–5.3)
PROT UR QL STRIP: NEGATIVE
PROT UR-MCNC: 9 MG/DL (ref 5–24)
PROT/CREAT UR: 0.1 MG/MG CREAT (ref 0.02–0.18)
PROT/CREAT UR: 101 MG/G CREAT (ref 24–184)
PTH-INTACT SERPL-MCNC: 147 PG/ML (ref 16–77)
RBC # BLD AUTO: 4.33 MILLION/UL (ref 3.8–5.1)
RBC #/AREA URNS HPF: ABNORMAL /HPF
SERVICE CMNT-IMP: ABNORMAL
SODIUM SERPL-SCNC: 141 MMOL/L (ref 135–146)
SP GR UR STRIP: 1.02 (ref 1–1.03)
SQUAMOUS #/AREA URNS HPF: ABNORMAL /HPF
WBC # BLD AUTO: 7.9 THOUSAND/UL (ref 3.8–10.8)
WBC #/AREA URNS HPF: ABNORMAL /HPF

## 2025-03-31 ENCOUNTER — PATIENT MESSAGE (OUTPATIENT)
Dept: DERMATOLOGY | Facility: CLINIC | Age: 75
End: 2025-03-31
Payer: MEDICARE

## 2025-03-31 ENCOUNTER — TELEPHONE (OUTPATIENT)
Dept: PRIMARY CARE | Facility: CLINIC | Age: 75
End: 2025-03-31
Payer: MEDICARE

## 2025-03-31 DIAGNOSIS — F41.9 ANXIETY: Primary | ICD-10-CM

## 2025-03-31 RX ORDER — ESCITALOPRAM OXALATE 5 MG/1
5 TABLET ORAL DAILY
Qty: 30 TABLET | Refills: 2 | Status: SHIPPED | OUTPATIENT
Start: 2025-03-31 | End: 2025-06-29

## 2025-04-01 NOTE — PATIENT COMMUNICATION
I called her. She has two questions.     Her one grandson has a teenage face - she means acne. She wants to know if I treat that. She also made an appointment for her younger grandson has one skin growth that seems to be similar to her seborrheic keratoses.     She is getting dizzy. This got worse with the increased dose of acitretin. She took 20mg of acitretin for only 3 doses. We agreed to stop the medicine for 2 weeks and then restart at once daily dosing. She is concerned about hair loss being caused by the medicine. Hair loss is multifactorial. It is possible but there are also more likely causes like changes in thyroid levels. She is on thyroid supplementation, thyroid removed.

## 2025-04-16 DIAGNOSIS — Z00.00 HEALTHCARE MAINTENANCE: ICD-10-CM

## 2025-04-16 DIAGNOSIS — Z00.00 GENERAL MEDICAL EXAM: ICD-10-CM

## 2025-04-18 DIAGNOSIS — Z00.00 GENERAL MEDICAL EXAM: ICD-10-CM

## 2025-04-21 ENCOUNTER — LAB (OUTPATIENT)
Dept: LAB | Facility: HOSPITAL | Age: 75
End: 2025-04-21
Payer: MEDICARE

## 2025-04-21 DIAGNOSIS — Z00.00 ENCOUNTER FOR GENERAL ADULT MEDICAL EXAMINATION WITHOUT ABNORMAL FINDINGS: Primary | ICD-10-CM

## 2025-04-21 DIAGNOSIS — Z00.00 HEALTH MAINTENANCE EXAMINATION: Primary | ICD-10-CM

## 2025-04-21 LAB
ALBUMIN SERPL BCP-MCNC: 4.1 G/DL (ref 3.4–5)
ALP SERPL-CCNC: 31 U/L (ref 33–136)
ALT SERPL W P-5'-P-CCNC: 15 U/L (ref 7–45)
ANION GAP SERPL CALC-SCNC: 10 MMOL/L (ref 10–20)
APPEARANCE UR: CLEAR
AST SERPL W P-5'-P-CCNC: 23 U/L (ref 9–39)
BASOPHILS # BLD AUTO: 0.03 X10*3/UL (ref 0–0.1)
BASOPHILS NFR BLD AUTO: 0.5 %
BILIRUB SERPL-MCNC: 0.5 MG/DL (ref 0–1.2)
BILIRUB UR STRIP.AUTO-MCNC: NEGATIVE MG/DL
BUN SERPL-MCNC: 15 MG/DL (ref 6–23)
CALCIUM SERPL-MCNC: 8.8 MG/DL (ref 8.6–10.3)
CHLORIDE SERPL-SCNC: 105 MMOL/L (ref 98–107)
CHOLEST SERPL-MCNC: 126 MG/DL (ref 0–199)
CHOLESTEROL/HDL RATIO: 1.9
CO2 SERPL-SCNC: 29 MMOL/L (ref 21–32)
COLOR UR: NORMAL
CREAT SERPL-MCNC: 1.22 MG/DL (ref 0.5–1.05)
EGFRCR SERPLBLD CKD-EPI 2021: 46 ML/MIN/1.73M*2
EOSINOPHIL # BLD AUTO: 0.13 X10*3/UL (ref 0–0.4)
EOSINOPHIL NFR BLD AUTO: 2 %
ERYTHROCYTE [DISTWIDTH] IN BLOOD BY AUTOMATED COUNT: 13.6 % (ref 11.5–14.5)
GLUCOSE SERPL-MCNC: 89 MG/DL (ref 74–99)
GLUCOSE UR STRIP.AUTO-MCNC: NORMAL MG/DL
HCT VFR BLD AUTO: 37.7 % (ref 36–46)
HDLC SERPL-MCNC: 65.1 MG/DL
HGB BLD-MCNC: 11.9 G/DL (ref 12–16)
IMM GRANULOCYTES # BLD AUTO: 0.02 X10*3/UL (ref 0–0.5)
IMM GRANULOCYTES NFR BLD AUTO: 0.3 % (ref 0–0.9)
KETONES UR STRIP.AUTO-MCNC: NEGATIVE MG/DL
LDLC SERPL CALC-MCNC: 40 MG/DL
LEUKOCYTE ESTERASE UR QL STRIP.AUTO: NEGATIVE
LYMPHOCYTES # BLD AUTO: 2.5 X10*3/UL (ref 0.8–3)
LYMPHOCYTES NFR BLD AUTO: 39.1 %
MCH RBC QN AUTO: 28.6 PG (ref 26–34)
MCHC RBC AUTO-ENTMCNC: 31.6 G/DL (ref 32–36)
MCV RBC AUTO: 91 FL (ref 80–100)
MONOCYTES # BLD AUTO: 0.59 X10*3/UL (ref 0.05–0.8)
MONOCYTES NFR BLD AUTO: 9.2 %
NEUTROPHILS # BLD AUTO: 3.13 X10*3/UL (ref 1.6–5.5)
NEUTROPHILS NFR BLD AUTO: 48.9 %
NITRITE UR QL STRIP.AUTO: NEGATIVE
NON HDL CHOLESTEROL: 61 MG/DL (ref 0–149)
NRBC BLD-RTO: 0 /100 WBCS (ref 0–0)
PH UR STRIP.AUTO: 6 [PH]
PLATELET # BLD AUTO: 239 X10*3/UL (ref 150–450)
POTASSIUM SERPL-SCNC: 4.2 MMOL/L (ref 3.5–5.3)
PROT SERPL-MCNC: 6.5 G/DL (ref 6.4–8.2)
PROT UR STRIP.AUTO-MCNC: NEGATIVE MG/DL
RBC # BLD AUTO: 4.16 X10*6/UL (ref 4–5.2)
RBC # UR STRIP.AUTO: NEGATIVE MG/DL
SODIUM SERPL-SCNC: 140 MMOL/L (ref 136–145)
SP GR UR STRIP.AUTO: 1.02
T4 FREE SERPL-MCNC: 1.31 NG/DL (ref 0.61–1.12)
TRIGL SERPL-MCNC: 106 MG/DL (ref 0–149)
TSH SERPL-ACNC: 0.16 MIU/L (ref 0.44–3.98)
UROBILINOGEN UR STRIP.AUTO-MCNC: NORMAL MG/DL
VLDL: 21 MG/DL (ref 0–40)
WBC # BLD AUTO: 6.4 X10*3/UL (ref 4.4–11.3)

## 2025-04-21 PROCEDURE — 82306 VITAMIN D 25 HYDROXY: CPT

## 2025-04-21 PROCEDURE — 80061 LIPID PANEL: CPT

## 2025-04-21 PROCEDURE — 83036 HEMOGLOBIN GLYCOSYLATED A1C: CPT

## 2025-04-21 PROCEDURE — 84439 ASSAY OF FREE THYROXINE: CPT

## 2025-04-21 PROCEDURE — 81003 URINALYSIS AUTO W/O SCOPE: CPT

## 2025-04-21 PROCEDURE — 85025 COMPLETE CBC W/AUTO DIFF WBC: CPT

## 2025-04-21 PROCEDURE — 80053 COMPREHEN METABOLIC PANEL: CPT

## 2025-04-21 PROCEDURE — 84443 ASSAY THYROID STIM HORMONE: CPT

## 2025-04-21 PROCEDURE — 86141 C-REACTIVE PROTEIN HS: CPT

## 2025-04-22 LAB
25(OH)D3 SERPL-MCNC: 72 NG/ML (ref 30–100)
CRP SERPL HS-MCNC: 1.1 MG/L
EST. AVERAGE GLUCOSE BLD GHB EST-MCNC: 117 MG/DL
HBA1C MFR BLD: 5.7 % (ref ?–5.7)
HOLD SPECIMEN: NORMAL

## 2025-04-22 NOTE — PROGRESS NOTES
Physical Exam    Name Annabel Cottrell    Date of Service :4/22/2025      Annabel Cottrell  75 y.o. is here for an annual physical exam    Past medical history is somewhat extensive  History of thyroidectomy for thyroid cancer and surgical hypothyroidism   History of endometrial cancer she is status post DOC/BSO 2008  Breast cancer diagnosed in 2019 with positive axillary nodes neoadjuvant hormone therapy was recommended that she could not tolerated she had surgery then did have postop chemo but was unable to complete the course because of side effects eventually found that she could tolerate tamoxifen which she continued  Coronary artery disease based upon elevated coronary artery calcium score of 383 several years ago.  She follows routinely with cardiology  Hypertension  Hypercholesteremia  Chronic kidney disease and she does see Dr. Up routinely  Anxiety and depression which is really been somewhat overwhelming at this time she is actually taking Lexapro feels it has been helpful had been on Effexor for some time also  Most recently has had multiple episodes get out of her recurrent episodes of severe debilitating vertigo felt to most likely represent Ménière's disease she is following routinely with ENT has improved somewhat with low-salt diet and she has been on hydrochlorothiazide for her blood pressure already she gets these episodes frequently  Since that time her anxiety has been even worse and is afraid to do many activities at all because of the debilitating vertigo  Her daughter Sheryl who is a physician is very concerned about her overwhelming anxiety and also concerned about perhaps some issues with cognition.  She had struggled with her weight for over time she did have a trial on Ozempic and had multiple GI issues with this  Psoriasis   Having left back pain after sitting in bleachers for   2-3 weeks  She is having  issues with stool coming through vagina has history or rectocele    Last bone density  study was July 2024 normal study worse T score -0.9     Medical History[1]    Surgical History[2]     Social History[3]     Social History     Social History Narrative    Not on file       Family History[4]     There were no vitals taken for this visit.    Physical Exam  Physical examination  Reveals a well-developed woman in no acute distress    appearance is age-appropriate she is overweight she appears uncomfortable there is pain to palpation in the right SI joint  HEENT exam  Extraocular motion is intact  Tympanic membranes and external auditory canals are normal  Oropharynx is normal  There is no cervical lymphadenopathy appreciated  The thyroid is within normal limits    Lungs    clear to auscultation and percussion    Cardiovascular   regular rate and rhythm  No murmurs rubs or gallops are appreciated        Abdomen   soft nontender bowel sounds are positive   there is no organomegaly noted      Periphery  Pulses are present without deficits noted  No peripheral edema is noted    Musculoskeletal  There is pain to palpation in the right SI joint  Gait is normal  Is no joint erythema or swelling noted  Range of motion is within normal limits  Strength is 5 of 5 without deficits noted    Dermatology  No concerning skin lesions are noted    Neurology  No deficits are noted  Judgment appears appropriate  Mood and affect are appropriate       MoCA 23/30 missing 4 of the 5 delayed recall items language fluency did make a mistake on one of the serial sevens and 1 on abstraction  Results from last 7 days   Lab Units 04/21/25  1407   WBC AUTO x10*3/uL 6.4   HEMOGLOBIN g/dL 11.9*   HEMATOCRIT % 37.7   PLATELETS AUTO x10*3/uL 239   NEUTROS PCT AUTO % 48.9   LYMPHS PCT AUTO % 39.1   MONOS PCT AUTO % 9.2   EOS PCT AUTO % 2.0      Results from last 7 days   Lab Units 04/21/25  1407   HEMOGLOBIN A1C % 5.7*     Results from last 7 days   Lab Units 04/21/25  1407   SODIUM mmol/L 140   POTASSIUM mmol/L 4.2   CHLORIDE mmol/L 105  "  CO2 mmol/L 29   BUN mg/dL 15   CREATININE mg/dL 1.22*   CALCIUM mg/dL 8.8   PROTEIN TOTAL g/dL 6.5   BILIRUBIN TOTAL mg/dL 0.5   ALK PHOS U/L 31*   ALT U/L 15   AST U/L 23   GLUCOSE mg/dL 89      Results from last 7 days   Lab Units 04/21/25  1407   CHOLESTEROL mg/dL 126   TRIGLYCERIDES mg/dL 106   HDL mg/dL 65.1           Results from last 7 days   Lab Units 04/21/25  1407   TSH mIU/L 0.16*   FREE T4 ng/dL 1.31*     Results from last 7 days   Lab Units 04/21/25  1407   PROTEIN U mg/dL NEGATIVE     No components found for: \"UA\"  Orders Only on 04/21/2025   Component Date Value Ref Range Status    WBC 04/21/2025 6.4  4.4 - 11.3 x10*3/uL Final    nRBC 04/21/2025 0.0  0.0 - 0.0 /100 WBCs Final    RBC 04/21/2025 4.16  4.00 - 5.20 x10*6/uL Final    Hemoglobin 04/21/2025 11.9 (L)  12.0 - 16.0 g/dL Final    Hematocrit 04/21/2025 37.7  36.0 - 46.0 % Final    MCV 04/21/2025 91  80 - 100 fL Final    MCH 04/21/2025 28.6  26.0 - 34.0 pg Final    MCHC 04/21/2025 31.6 (L)  32.0 - 36.0 g/dL Final    RDW 04/21/2025 13.6  11.5 - 14.5 % Final    Platelets 04/21/2025 239  150 - 450 x10*3/uL Final    Neutrophils % 04/21/2025 48.9  40.0 - 80.0 % Final    Immature Granulocytes %, Automated 04/21/2025 0.3  0.0 - 0.9 % Final    Immature Granulocyte Count (IG) includes promyelocytes, myelocytes and metamyelocytes but does not include bands. Percent differential counts (%) should be interpreted in the context of the absolute cell counts (cells/UL).    Lymphocytes % 04/21/2025 39.1  13.0 - 44.0 % Final    Monocytes % 04/21/2025 9.2  2.0 - 10.0 % Final    Eosinophils % 04/21/2025 2.0  0.0 - 6.0 % Final    Basophils % 04/21/2025 0.5  0.0 - 2.0 % Final    Neutrophils Absolute 04/21/2025 3.13  1.60 - 5.50 x10*3/uL Final    Percent differential counts (%) should be interpreted in the context of the absolute cell counts (cells/uL).    Immature Granulocytes Absolute, Au* 04/21/2025 0.02  0.00 - 0.50 x10*3/uL Final    Lymphocytes Absolute " 04/21/2025 2.50  0.80 - 3.00 x10*3/uL Final    Monocytes Absolute 04/21/2025 0.59  0.05 - 0.80 x10*3/uL Final    Eosinophils Absolute 04/21/2025 0.13  0.00 - 0.40 x10*3/uL Final    Basophils Absolute 04/21/2025 0.03  0.00 - 0.10 x10*3/uL Final    Glucose 04/21/2025 89  74 - 99 mg/dL Final    Sodium 04/21/2025 140  136 - 145 mmol/L Final    Potassium 04/21/2025 4.2  3.5 - 5.3 mmol/L Final    Chloride 04/21/2025 105  98 - 107 mmol/L Final    Bicarbonate 04/21/2025 29  21 - 32 mmol/L Final    Anion Gap 04/21/2025 10  10 - 20 mmol/L Final    Urea Nitrogen 04/21/2025 15  6 - 23 mg/dL Final    Creatinine 04/21/2025 1.22 (H)  0.50 - 1.05 mg/dL Final    eGFR 04/21/2025 46 (L)  >60 mL/min/1.73m*2 Final    Calculations of estimated GFR are performed using the 2021 CKD-EPI Study Refit equation without the race variable for the IDMS-Traceable creatinine methods.  https://jasn.asnjournals.org/content/early/2021/09/22/ASN.3318208019    Calcium 04/21/2025 8.8  8.6 - 10.3 mg/dL Final    Albumin 04/21/2025 4.1  3.4 - 5.0 g/dL Final    Alkaline Phosphatase 04/21/2025 31 (L)  33 - 136 U/L Final    Total Protein 04/21/2025 6.5  6.4 - 8.2 g/dL Final    AST 04/21/2025 23  9 - 39 U/L Final    Bilirubin, Total 04/21/2025 0.5  0.0 - 1.2 mg/dL Final    ALT 04/21/2025 15  7 - 45 U/L Final    Patients treated with Sulfasalazine may generate falsely decreased results for ALT.    Cholesterol 04/21/2025 126  0 - 199 mg/dL Final          Age      Desirable   Borderline High   High     0-19 Y     0 - 169       170 - 199     >/= 200    20-24 Y     0 - 189       190 - 224     >/= 225         >24 Y     0 - 199       200 - 239     >/= 240   **All ranges are based on fasting samples. Specific   therapeutic targets will vary based on patient-specific   cardiac risk.    Pediatric guidelines reference:Pediatrics 2011, 128(S5).Adult guidelines reference: NCEP ATPIII Guidelines,DANICA 2001, 258:2486-97    Venipuncture immediately after or during the  administration of Metamizole may lead to falsely low results. Testing should be performed immediately prior to Metamizole dosing.    HDL-Cholesterol 04/21/2025 65.1  mg/dL Final      Age       Very Low   Low     Normal    High    0-19 Y    < 35      < 40     40-45     ----  20-24 Y    ----     < 40      >45      ----        >24 Y      ----     < 40     40-60      >60      Cholesterol/HDL Ratio 04/21/2025 1.9   Final      Ref Values  Desirable  < 3.4  High Risk  > 5.0    LDL Calculated 04/21/2025 40  <=99 mg/dL Final                                Near   Borderline      AGE      Desirable  Optimal    High     High     Very High     0-19 Y     0 - 109     ---    110-129   >/= 130     ----    20-24 Y     0 - 119     ---    120-159   >/= 160     ----      >24 Y     0 -  99   100-129  130-159   160-189     >/=190      VLDL 04/21/2025 21  0 - 40 mg/dL Final    Triglycerides 04/21/2025 106  0 - 149 mg/dL Final    Age              Desirable        Borderline         High        Very High  SEX:B           mg/dL             mg/dL               mg/dL      mg/dL  <=14D                       ----               ----        ----  15D-365D                    ----               ----        ----  1Y-9Y           0-74               75-99             >=100       ----  10Y-19Y        0-89                            >=130       ----  20Y-24Y        0-114             115-149             >=150      ----  >= 25Y         0-149             150-199             200-499    >=500      Venipuncture immediately after or during the administration of Metamizole may lead to falsely low results. Testing should be performed immediately prior to Metamizole dosing.    Non HDL Cholesterol 04/21/2025 61  0 - 149 mg/dL Final          Age       Desirable   Borderline High   High     Very High     0-19 Y     0 - 119       120 - 144     >/= 145    >/= 160    20-24 Y     0 - 149       150 - 189     >/= 190      ----         >24 Y    30 mg/dL  above LDL Cholesterol goal      Thyroid Stimulating Hormone 04/21/2025 0.16 (L)  0.44 - 3.98 mIU/L Final    CRP, High Sensitivity 04/21/2025 1.1 (H)  <1.0 mg/L Final    Vitamin D, 25-Hydroxy, Total 04/21/2025 72  30 - 100 ng/mL Final    Hemoglobin A1C 04/21/2025 5.7 (H)  See comment % Final    Estimated Average Glucose 04/21/2025 117  Not Established mg/dL Final    Color, Urine 04/21/2025 Light-Yellow  Light-Yellow, Yellow, Dark-Yellow Final    Appearance, Urine 04/21/2025 Clear  Clear Final    Specific Gravity, Urine 04/21/2025 1.016  1.005 - 1.035 Final    pH, Urine 04/21/2025 6.0  5.0, 5.5, 6.0, 6.5, 7.0, 7.5, 8.0 Final    Protein, Urine 04/21/2025 NEGATIVE  NEGATIVE, 10 (TRACE), 20 (TRACE) mg/dL Final    Glucose, Urine 04/21/2025 Normal  Normal mg/dL Final    Blood, Urine 04/21/2025 NEGATIVE  NEGATIVE mg/dL Final    Ketones, Urine 04/21/2025 NEGATIVE  NEGATIVE mg/dL Final    Bilirubin, Urine 04/21/2025 NEGATIVE  NEGATIVE mg/dL Final    Urobilinogen, Urine 04/21/2025 Normal  Normal mg/dL Final    Nitrite, Urine 04/21/2025 NEGATIVE  NEGATIVE Final    Leukocyte Esterase, Urine 04/21/2025 NEGATIVE  NEGATIVE Final    Extra Tube 04/21/2025 Hold for add-ons.   Final    Auto resulted.    Thyroxine, Free 04/21/2025 1.31 (H)  0.61 - 1.12 ng/dL Final     [unfilled]   Imaging  No results found.    Cardiology, Vascular, and Other Imaging  No other imaging results found for the past 2 days     The ASCVD Risk score (Parvez DK, et al., 2019) failed to calculate for the following reasons:    The valid total cholesterol range is 130 to 320 mg/dL   .       Problem List Items Addressed This Visit    None      Assessment/Plan   #1 hypertension second blood pressure was much better her blood pressure at home has been acceptable we will continue current regiment  2.  Hypercholesteremia with elevated coronary artery calcium score cholesterol looks excellent with the addition of Zetia continue current regiment  3.  Psoriasis  following routinely with dermatology  4.  Vertigo question Ménière's disease this is really been quite debilitating for her continues to have intermittent episodes of somewhat atypical we will refer her to otology for further evaluation  #5 prediabetes as well as chronic kidney disease we touch base and I do think she would be a good candidate for SGLT2 agent like Kelton really does not want to start another medication at this time  6.  Chronic kidney disease creatinine eGFR stable again discussed SG LT 2 agent she will follow-up with Dr. Up  7.  Breast cancer no evidence of recurrence follows routinely with breast specialist  8.  Remote history of  endometrial cancer no follow-up at this time  9.  Hypothyroidism history of thyroid cancer she is slightly over replaced on her Synthroid so we will decrease her Synthroid to 100 mcg daily recheck in 4 to 6 months  10.  Anxiety and depression more anxiety overwhelming at times seems to be doing a little better with the Lexapro on 10 mg daily I do not feel she needs to be on both Effexor and Lexapro although so we are going to wean off the Effexor and have her take 37.5 mg daily for the next 2 weeks then 37.5 mg every other day for a week plan can stop and we will adjust Lexapro dose  11.  Mild cognitive impairment with MoCA 23/30 as above  #12 snoring wonder if she could have sleep apnea I have recommended sleep apnea home test she is not really interested right now  13.  Health maintenance  Bone density was done 2024 and actually normal range worst T-score -0.9  Increased routine regular exercise is encouraged she is really not doing much at this time  I will see has not had Shingrix vaccination I have recommended this  Covid 19 vaccine recommended every 6 months  RSV vaccination in fall when she does flu            [1]   Past Medical History:  Diagnosis Date    Anxiety 04/04/2023    Arthritis     Cataract     CKD (chronic kidney disease)     stage 3a     Conjunctival hemorrhage, unspecified eye 11/14/2017    Subconjunctival hemorrhage    Depression     Endometrial cancer (Multi) 04/04/2023    Endometrial hyperplasia, unspecified     Endometrial hyperplasia    GERD (gastroesophageal reflux disease)     Hyperlipidemia     Hypertension     Hypothyroidism     Intraductal papilloma of right breast 04/04/2023    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    Malignant neoplasm of uterine fundus    Other abnormal and inconclusive findings on diagnostic imaging of breast 12/19/2019    Abnormal MRI, breast    Personal history of malignant neoplasm of thyroid 12/19/2019    History of malignant neoplasm of thyroid    Personal history of other malignant neoplasm of skin     History of other malignant neoplasm of skin    Personal history of other medical treatment 09/08/2017    History of screening mammography    Personal history of other specified conditions 12/06/2019    History of abnormal mammogram    Pneumonitis due to inhalation of food and vomit (Multi) 10/06/2016    Aspiration pneumonia of both upper lobes due to gastric secretions    Vaginal atrophy 04/04/2023   [2]   Past Surgical History:  Procedure Laterality Date    CATARACT EXTRACTION Left 04/25/2024    Dr. Banda    CATARACT EXTRACTION W/  INTRAOCULAR LENS IMPLANT Right 05/30/2024    Dr. Banda Aim: -2.00 to -2.50    HYSTERECTOMY  08/03/2017    Hysterectomy    OTHER SURGICAL HISTORY  06/25/2020    Mastectomy partial    OTHER SURGICAL HISTORY  06/25/2020    Artemas lymph node biopsy procedure    OTHER SURGICAL HISTORY  07/06/2020    Axillary lymphadenectomy    THYROID SURGERY  01/15/2014    Thyroid Surgery   [3]   Social History  Tobacco Use    Smoking status: Never    Smokeless tobacco: Never    Tobacco comments:     No history of anesthesia reactions. No family history of MH.     No illnesses in the past 30 days.     Does get mildly SOB flight of stairs, so she goes slowly.     Ambulates freely     Is  able to lie flat for 30 minutes.  Updated on 5/22/24  No change   Vaping Use    Vaping status: Never Used   Substance Use Topics    Alcohol use: Yes     Comment: rare    Drug use: Never   [4]   Family History  Problem Relation Name Age of Onset    Heart attack Mother      Heart attack Father      Other (cardiac arrhythmia) Sister

## 2025-04-23 ASSESSMENT — PROMIS GLOBAL HEALTH SCALE
EMOTIONAL_PROBLEMS: SOMETIMES
CARRYOUT_SOCIAL_ACTIVITIES: GOOD
RATE_SOCIAL_SATISFACTION: POOR
CARRYOUT_PHYSICAL_ACTIVITIES: MODERATELY
RATE_PHYSICAL_HEALTH: GOOD
RATE_MENTAL_HEALTH: FAIR
RATE_GENERAL_HEALTH: FAIR
RATE_AVERAGE_PAIN: 7
RATE_QUALITY_OF_LIFE: GOOD
RATE_AVERAGE_FATIGUE: MODERATE

## 2025-04-24 ENCOUNTER — APPOINTMENT (OUTPATIENT)
Dept: PRIMARY CARE | Facility: CLINIC | Age: 75
End: 2025-04-24
Payer: MEDICARE

## 2025-04-24 VITALS
DIASTOLIC BLOOD PRESSURE: 86 MMHG | OXYGEN SATURATION: 95 % | SYSTOLIC BLOOD PRESSURE: 136 MMHG | HEART RATE: 84 BPM | WEIGHT: 152 LBS | BODY MASS INDEX: 26.93 KG/M2 | HEIGHT: 63 IN

## 2025-04-24 DIAGNOSIS — I25.10 ATHEROSCLEROSIS OF NATIVE CORONARY ARTERY OF NATIVE HEART WITHOUT ANGINA PECTORIS: ICD-10-CM

## 2025-04-24 DIAGNOSIS — E03.9 ACQUIRED HYPOTHYROIDISM: ICD-10-CM

## 2025-04-24 DIAGNOSIS — H81.09 MENIERE'S DISEASE, UNSPECIFIED LATERALITY: ICD-10-CM

## 2025-04-24 DIAGNOSIS — H90.0 CONDUCTIVE HEARING LOSS, BILATERAL: ICD-10-CM

## 2025-04-24 DIAGNOSIS — N18.31 STAGE 3A CHRONIC KIDNEY DISEASE (MULTI): ICD-10-CM

## 2025-04-24 DIAGNOSIS — G89.29 CHRONIC BILATERAL LOW BACK PAIN, UNSPECIFIED WHETHER SCIATICA PRESENT: ICD-10-CM

## 2025-04-24 DIAGNOSIS — I10 BENIGN ESSENTIAL HYPERTENSION: ICD-10-CM

## 2025-04-24 DIAGNOSIS — M54.50 CHRONIC BILATERAL LOW BACK PAIN, UNSPECIFIED WHETHER SCIATICA PRESENT: ICD-10-CM

## 2025-04-24 DIAGNOSIS — N81.6 RECTOCELE: Primary | ICD-10-CM

## 2025-04-24 DIAGNOSIS — F41.9 ANXIETY: ICD-10-CM

## 2025-04-24 DIAGNOSIS — E78.00 HYPERCHOLESTEROLEMIA: ICD-10-CM

## 2025-04-24 PROCEDURE — 3075F SYST BP GE 130 - 139MM HG: CPT | Performed by: INTERNAL MEDICINE

## 2025-04-24 PROCEDURE — 1125F AMNT PAIN NOTED PAIN PRSNT: CPT | Performed by: INTERNAL MEDICINE

## 2025-04-24 PROCEDURE — 1160F RVW MEDS BY RX/DR IN RCRD: CPT | Performed by: INTERNAL MEDICINE

## 2025-04-24 PROCEDURE — UHSPHYS PR UH SELECT PHYSICAL: Performed by: INTERNAL MEDICINE

## 2025-04-24 PROCEDURE — 3079F DIAST BP 80-89 MM HG: CPT | Performed by: INTERNAL MEDICINE

## 2025-04-24 PROCEDURE — 1036F TOBACCO NON-USER: CPT | Performed by: INTERNAL MEDICINE

## 2025-04-24 PROCEDURE — 1159F MED LIST DOCD IN RCRD: CPT | Performed by: INTERNAL MEDICINE

## 2025-04-24 RX ORDER — VENLAFAXINE HYDROCHLORIDE 37.5 MG/1
37.5 CAPSULE, EXTENDED RELEASE ORAL DAILY
Qty: 30 CAPSULE | Refills: 1 | Status: SHIPPED | OUTPATIENT
Start: 2025-04-24 | End: 2025-06-23

## 2025-04-24 RX ORDER — LEVOTHYROXINE SODIUM 100 UG/1
100 TABLET ORAL DAILY
Qty: 90 TABLET | Refills: 3 | Status: SHIPPED | OUTPATIENT
Start: 2025-04-24 | End: 2026-04-24

## 2025-04-24 RX ORDER — LEVOTHYROXINE SODIUM 100 UG/1
100 TABLET ORAL DAILY
Qty: 90 TABLET | Refills: 3 | Status: SHIPPED | OUTPATIENT
Start: 2025-04-24 | End: 2025-04-24 | Stop reason: SDUPTHER

## 2025-04-24 ASSESSMENT — PAIN SCALES - GENERAL: PAINLEVEL_OUTOF10: 2

## 2025-04-28 DIAGNOSIS — E78.00 HYPERCHOLESTEROLEMIA: ICD-10-CM

## 2025-04-28 RX ORDER — ROSUVASTATIN CALCIUM 40 MG/1
40 TABLET, COATED ORAL NIGHTLY
Qty: 90 TABLET | Refills: 3 | Status: SHIPPED | OUTPATIENT
Start: 2025-04-28

## 2025-04-29 ENCOUNTER — TELEPHONE (OUTPATIENT)
Dept: PRIMARY CARE | Facility: CLINIC | Age: 75
End: 2025-04-29
Payer: MEDICARE

## 2025-04-29 ENCOUNTER — HOSPITAL ENCOUNTER (OUTPATIENT)
Dept: RADIOLOGY | Facility: HOSPITAL | Age: 75
Discharge: HOME | End: 2025-04-29
Payer: MEDICARE

## 2025-04-29 DIAGNOSIS — G89.29 CHRONIC BILATERAL LOW BACK PAIN, UNSPECIFIED WHETHER SCIATICA PRESENT: ICD-10-CM

## 2025-04-29 DIAGNOSIS — M54.50 CHRONIC BILATERAL LOW BACK PAIN, UNSPECIFIED WHETHER SCIATICA PRESENT: ICD-10-CM

## 2025-04-29 PROCEDURE — 72110 X-RAY EXAM L-2 SPINE 4/>VWS: CPT | Performed by: RADIOLOGY

## 2025-04-29 PROCEDURE — 72110 X-RAY EXAM L-2 SPINE 4/>VWS: CPT

## 2025-04-29 NOTE — TELEPHONE ENCOUNTER
She can't see Dr. Jordan until July. They had an a cancellation but she could not take it because Luis Miguel has a colonoscopy and she can't take him. She states this is an emergency since it is very painful every time she goes to the bathroom. I called they have nothing sooner and she is on a cancellation list. Can you reach out her and see if she can see her sooner?

## 2025-05-01 ENCOUNTER — TELEPHONE (OUTPATIENT)
Dept: OTOLARYNGOLOGY | Facility: CLINIC | Age: 75
End: 2025-05-01
Payer: MEDICARE

## 2025-05-01 DIAGNOSIS — G31.84 MCI (MILD COGNITIVE IMPAIRMENT): Primary | ICD-10-CM

## 2025-05-01 DIAGNOSIS — H81.02 MENIERE'S DISEASE OF LEFT EAR: Primary | ICD-10-CM

## 2025-05-01 DIAGNOSIS — H90.A22 SENSORINEURAL HEARING LOSS (SNHL) OF LEFT EAR WITH RESTRICTED HEARING OF RIGHT EAR: ICD-10-CM

## 2025-05-01 DIAGNOSIS — H90.3 ASNHL (ASYMMETRICAL SENSORINEURAL HEARING LOSS): ICD-10-CM

## 2025-05-01 DIAGNOSIS — H93.12 TINNITUS OF LEFT EAR: ICD-10-CM

## 2025-05-07 ENCOUNTER — APPOINTMENT (OUTPATIENT)
Dept: OBSTETRICS AND GYNECOLOGY | Facility: CLINIC | Age: 75
End: 2025-05-07
Payer: MEDICARE

## 2025-05-12 ENCOUNTER — OFFICE VISIT (OUTPATIENT)
Dept: HEMATOLOGY/ONCOLOGY | Facility: HOSPITAL | Age: 75
End: 2025-05-12
Payer: MEDICARE

## 2025-05-12 ENCOUNTER — HOSPITAL ENCOUNTER (OUTPATIENT)
Dept: RADIOLOGY | Facility: HOSPITAL | Age: 75
Discharge: HOME | End: 2025-05-12
Payer: MEDICARE

## 2025-05-12 VITALS
TEMPERATURE: 98.1 F | SYSTOLIC BLOOD PRESSURE: 144 MMHG | DIASTOLIC BLOOD PRESSURE: 68 MMHG | OXYGEN SATURATION: 96 % | RESPIRATION RATE: 20 BRPM | BODY MASS INDEX: 27.84 KG/M2 | HEART RATE: 82 BPM | HEIGHT: 63 IN | WEIGHT: 157.1 LBS

## 2025-05-12 DIAGNOSIS — Z12.31 ENCOUNTER FOR SCREENING MAMMOGRAM FOR MALIGNANT NEOPLASM OF BREAST: ICD-10-CM

## 2025-05-12 DIAGNOSIS — M85.80 OSTEOPENIA, UNSPECIFIED LOCATION: ICD-10-CM

## 2025-05-12 DIAGNOSIS — C77.3 CARCINOMA OF LEFT BREAST METASTATIC TO AXILLARY LYMPH NODE: ICD-10-CM

## 2025-05-12 DIAGNOSIS — C50.912 CARCINOMA OF LEFT BREAST METASTATIC TO AXILLARY LYMPH NODE: ICD-10-CM

## 2025-05-12 DIAGNOSIS — Z79.810 ENCOUNTER FOR MONITORING TAMOXIFEN THERAPY: ICD-10-CM

## 2025-05-12 DIAGNOSIS — Z51.81 ENCOUNTER FOR MONITORING TAMOXIFEN THERAPY: ICD-10-CM

## 2025-05-12 DIAGNOSIS — Z78.0 MENOPAUSE: ICD-10-CM

## 2025-05-12 DIAGNOSIS — N95.1 HOT FLASH, MENOPAUSAL: ICD-10-CM

## 2025-05-12 PROCEDURE — 1126F AMNT PAIN NOTED NONE PRSNT: CPT | Performed by: NURSE PRACTITIONER

## 2025-05-12 PROCEDURE — 1159F MED LIST DOCD IN RCRD: CPT | Performed by: NURSE PRACTITIONER

## 2025-05-12 PROCEDURE — 99215 OFFICE O/P EST HI 40 MIN: CPT | Performed by: NURSE PRACTITIONER

## 2025-05-12 PROCEDURE — 77067 SCR MAMMO BI INCL CAD: CPT

## 2025-05-12 PROCEDURE — 1036F TOBACCO NON-USER: CPT | Performed by: NURSE PRACTITIONER

## 2025-05-12 PROCEDURE — 1160F RVW MEDS BY RX/DR IN RCRD: CPT | Performed by: NURSE PRACTITIONER

## 2025-05-12 PROCEDURE — 77067 SCR MAMMO BI INCL CAD: CPT | Performed by: STUDENT IN AN ORGANIZED HEALTH CARE EDUCATION/TRAINING PROGRAM

## 2025-05-12 PROCEDURE — 3078F DIAST BP <80 MM HG: CPT | Performed by: NURSE PRACTITIONER

## 2025-05-12 PROCEDURE — 3077F SYST BP >= 140 MM HG: CPT | Performed by: NURSE PRACTITIONER

## 2025-05-12 PROCEDURE — 77063 BREAST TOMOSYNTHESIS BI: CPT | Performed by: STUDENT IN AN ORGANIZED HEALTH CARE EDUCATION/TRAINING PROGRAM

## 2025-05-12 RX ORDER — FEZOLINETANT 45 MG/1
45 TABLET, FILM COATED ORAL DAILY
Qty: 30 TABLET | Refills: 2 | Status: SHIPPED | OUTPATIENT
Start: 2025-05-12 | End: 2025-05-13

## 2025-05-12 RX ORDER — TAMOXIFEN CITRATE 20 MG/1
20 TABLET ORAL DAILY
Qty: 90 TABLET | Refills: 3 | Status: SHIPPED | OUTPATIENT
Start: 2025-05-12 | End: 2026-05-12

## 2025-05-12 ASSESSMENT — PATIENT HEALTH QUESTIONNAIRE - PHQ9
SUM OF ALL RESPONSES TO PHQ9 QUESTIONS 1 & 2: 0
1. LITTLE INTEREST OR PLEASURE IN DOING THINGS: NOT AT ALL
2. FEELING DOWN, DEPRESSED OR HOPELESS: NOT AT ALL

## 2025-05-12 ASSESSMENT — PAIN SCALES - GENERAL: PAINLEVEL_OUTOF10: 0-NO PAIN

## 2025-05-12 NOTE — PROGRESS NOTES
Patient ID: Annabel Cottrell is a 75 y.o. female.  BREAST CANCER DIAGNOSIS:         Breast         AJCC Edition: 8th (AJCC), Diagnosis Date: Dec 2019, Stage(no match), ypT2 ypN3 cM0  G2     Treatment Synopsis:    BREAST CANCER DIAGNOSIS  ypT2 ypN3M0 ER/WV+, HER2- lobular breast cancer        Treatment History:    1. Abnormal screening mammogram on 11/5/2019 showed architectural distortion in central lateral left breast.  2. 12/6/2019, left diagnostic mammogam/US demonstrated 1.1 cm irregular hypoechoic mass in 2:30 position, 3 cm from nipple with slightly abnormal  axillary lymph node. Biopsy of left breast and axillary lymph node yielded invasive lobular carcinoma grade 2, LCIS,  ER >95%, WV >95%, HER2 negative with metastatic carcinoma involving left axillary lymph node. Tumor board rec breast MRI, preoperative hormonal therapy vs primary surgery.   3. Breast MRI on 12/17/2019 demonstrated known biopsy-proven left breast mass with adjacent non-mass enhancement measuring up to 2.2 x 1.5 cm and left axillary level 1 metastatic lymphadenopathy.  Indeterminate right breast enhancing mass.  4. Us-guided needle biopsy of right breast on 12/19/2019 showed a mass at 8:00 4 CM  from nipple -- intraductal papilloma with focal atypia.   5. Preoperative hormonal treatment with anastrozole initiated 1/9/20.  She did not tolerate this (edema) and was switched to  exemestane 2/19/20. On 3/16 she stopped exemestane for side effects. She took two doses of anastrozole 4/1 and 4/2 then stopped for edema.  Letrozole initiated 4/8/20. MammaPrint on core was Low Risk Luminal A.  6. Progression on breast imaging 5/20/20. Staging was negative other than a single sclerotic rib lesion. Left partial mastectomy and SLN on 6/11/20 showed 3 cm residual tumor with multiple close and positive margins, 4/4 positive SLN. Right excision of  area of intraductal papilloma showed no additional findings. Reexcision left breast and ALND 7/1/20 showed LCIS  in the breast and additional 4/4 positive ALN for total of 8/8 positive LNs.  7. Postop chemotherapy with Docetaxel/Cytoxan x 4-6 cycles and radiation planned. First Docetaxel/Cytoxan 7/31/20. Stopped after 1st cycle because of hospital admission for sepsis/febrile neutropenia despite pegfilgrastim. Repeat PET/CT negative 8/2020.  8. Adjuvant RT completed 11/6/20.   9. Adjuvant letrozole initiated 12/2020. Due to intolerable symptoms, she switched to tamoxifen in July 2021.  10. Adjuvant Zometa initiated 1/2021. Unable to complete 6th dose October 2023 due to CKD. 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), Other abnormal and inconclusive findings on diagnostic imaging of breast (12/19/2019), Personal history of malignant neoplasm of thyroid (12/19/2019), Personal history of other malignant neoplasm of skin, Personal history of other medical treatment (09/08/2017), Personal history of other specified conditions (12/06/2019), Pneumonitis due to inhalation of food and vomit (Multi) (10/06/2016), and Vaginal atrophy (04/04/2023).HTN  Hypercholesterolemia  CKD III   Anxiety, Depression   Drusen   Osteopenia  Vitamin D deficiency  Hypothyroidism  GERD    Surgical History: Thyroid surgery (01/15/2014); Hysterectomy (08/03/2017, Left partial mastectomy,   Social HistoryLanguage preferred: English and Tajik  Two daughter and four grandchildren. One daughter is a PCP physician  Exercise habits: Plays tennis. Active.   and lives with  Luis Miguel who works in  "construction business   Never smoker  Social drinker  Work history: homemaker  Social Substance History:  ·  Social History denies smoking, alcohol and drug use   ·  Smoking Status never smoker (1)   ·  Additional History       FamHx:   Grandfather: tongue cancer (heavy smoker)    Positive with myocardial infarction and cardiac arrhythmia     ObGyn Hx:  Menarche at age 12   with first birth at 25  menopausal at age 48 with Premarin usage for severe postmenopausal symptoms. 21 years of HRT and stopped in Dec 2019 after Dx of BrCa.    Family History:    Family History   Problem Relation Name Age of Onset    Heart attack Mother  62    Heart attack Father  50        lived until 78    Other (cardiac arrhythmia) Sister       Family Oncology History:  Cancer-related family history is not on file.    HISTORY OF PRESENT ILLNESS:  Annabel Cottrell is a 75 y.o. female who returns today for Breast cancer surveillance and follow-up. She is accompanied by her  today. Today I have reviewed with the patient I will be conducting a clinical physical breast exam. Patient has declined a second medical professional today during the exam as a chapperone.     She continues compliant on Tamoxifen- she is tolerating this well and reports ston and off hot flashes, she is requesting a rx for Veozah. She has no breast cancer concerns today. She completed annual mammogram today.      She denies any chest pain or breathing issues, no cough, no sob.      She continues with on and off vertigo and will  be seeing neurologist, continued Hearing Aide. She continues with left ear \"muffled\" sensation but denies any tinnitus. She denies any vision, headache issues, or loss of balance, no falls.     She denies any new or unexplained bone aches or pains.    She denies any skin lesions or masses, oral sores lesions or infections. She see derm regularly for dermatology.       She reports a normal appetite but works to eat less.  She reports " "continued stable constipation uses dietary changes.  She denies issues with urination.      She denies any issues with sleep, denies fatigue.       Review of Systems - Oncology  ROS 14 points performed, See HPI for exceptions    OBJECTIVE:    VS / Pain:  /68   Pulse 82   Temp 36.7 °C (98.1 °F) (Temporal)   Resp 20   Ht 1.588 m (5' 2.52\")   Wt 71.3 kg (157 lb 1.6 oz)   SpO2 96%   BMI 28.26 kg/m²   BSA: 1.77 meters squared   Pain Scale: 4  ECO- Fully active, able to carry on all pre-disease performance w/o restriction.       Physical Exam  Constitutional: Well developed, awake/alert/oriented x4, no distress, alert and cooperative  EYES: Sclera clear  ENMT: mucous membranes moist, no apparent injury, no lesions seen  Head/Neck: Neck supple, no apparent injury, thyroid without mass or tenderness, No JVD, trachea midline, no bruits  Respiratory / Thoracic: Patent airways, clear to all lobes, normal breath sounds with good chest expansion, thorax symmetric.  Cardiovascular: Regular, rate and rhythm, no murmurs, 2+ equal pulses of the extremities, normal auscultated S 1and S 2  GI: Nondistended, soft, non-tender, no rebound tenderness or guarding, no masses palpable, no organomegaly, +BS, no bruits  Musculoskeletal: ROM intact, no joint swelling, normal strength, no spinal tenderness  Extremities: normal extremities, no cyanosis edema, contusions or wounds, no clubbing  Neurological: alert and oriented x4, intact senses, motor, response and reflexes, normal strength  Breast: s/p left partial mastectomy and radiation therapy. Bilateral breasts free of any palpable masses or lesions, + baseline tissue thickening effect of radiation therapy to left breast.   Lymphatic: No cervical, supraclavicular, infraclavicular or axillary lymphadenopathy  Psychological: Appropriate and talkative mood and behavior  Skin: Warm and dry, no lesions, no rashes, no jaundice    Diagnostic Results   25 ____ Pending result " Bilateral screening mammogram completed today      === 07/01/24 ===    DEXA BONE DENSITY    - Impression -  DEXA:  According to World Health Organization criteria,  classification is normal.    Followup recommended in 2 years or sooner as clinically warranted.    All images and detailed analysis are available on the  Radiology  PACS.    MACRO:  None    Signed by: Lisa Brown 7/1/2024 12:47 PM  Dictation workstation:   [id number]    Lab Results   Component Value Date    WBC 6.4 04/21/2025    HGB 11.9 (L) 04/21/2025    HCT 37.7 04/21/2025    MCV 91 04/21/2025     04/21/2025       Chemistry    Lab Results   Component Value Date/Time     04/21/2025 1407     03/20/2025 1632    K 4.2 04/21/2025 1407    K 4.4 03/20/2025 1632     04/21/2025 1407     03/20/2025 1632    CO2 29 04/21/2025 1407    CO2 30 03/20/2025 1632    BUN 15 04/21/2025 1407    BUN 18 03/20/2025 1632    CREATININE 1.22 (H) 04/21/2025 1407    CREATININE 1.30 (H) 03/20/2025 1632    Lab Results   Component Value Date/Time    CALCIUM 8.8 04/21/2025 1407    CALCIUM 9.6 03/20/2025 1632    CALCIUM 9.6 03/20/2025 1632    ALKPHOS 31 (L) 04/21/2025 1407    ALKPHOS 31 (L) 03/13/2025 1316    AST 23 04/21/2025 1407    AST 22 03/13/2025 1316    ALT 15 04/21/2025 1407    ALT 16 03/13/2025 1316    BILITOT 0.5 04/21/2025 1407    BILITOT 0.5 03/13/2025 1316           Assessment/Plan   Carcinoma of left breast metastatic to axillary lymph node, Clinical: Stage IIIB (cT2, cN3, cM0, G2, ER+, HI+, HER2-)  Diagnoses and all orders for this visit:  Osteopenia, unspecified location (Primary)  Menopause  Encounter for screening mammogram for malignant neoplasm of breast  Encounter for monitoring tamoxifen therapy  Carcinoma of left breast metastatic to axillary lymph node        ypT2 ypN3 cM0 ER/HI+, HER2- lobular breast cancer (left). Initially treated with neoadjuvant endocrine therapy with clinical progression. She  is s/p lumpectomy and ALND  on 7/1/20. She was unable to tolerate adjuvant chemotherapy (received TC x1 cycle), but completed radiation therapy.  She was initiated on adjuvant letrozole in 12/2020; adjuvant bisphosphonates were also pursued. Due to grade  2-3 arthralgias, she was switched to tamoxifen in 7/2021.     - She is tolerating tamoxifen very well overall. Continue 20 mg daily, goal 10 years of therapy.     - Grade 1 hot flashes.  Sent in rx today for Veozah- trial is 30 days with 2 refill and she will let me know how this working      There is no evidence on clinical breast exam today for breast cancer recurrence. RTC now at annual imaging appointments. She understands I will be in touch once Mammogram completed today is resulted.      Osteopenia.   DEXA 4/2022- normal results, repeat July 2024 continued normal density.   - Continue calcium and vitamin D supplementation.  - Zometa, 6th was due Fall 2023, however worsening of CrCl 30. Discontinuation of Zometa, is established with Nephrology for CKD    General Health Maintenance / Hypothyroidism. Follows with PCP Dr Kenzie Meng.     She is established with Nephrology for CKD that is HTN provoked     H/o endometrial cancer. S/p hysterectomy and BSO.    At least 35 minutes of direct consultation was spent with the patient today reviewing her cancer care plan, educating and answering questions regarding ongoing follow up, greater than 50% in counseling and coordination of care        Treatment Plan:  [No matching plan found]        Thank you for the opportunity to be involved in the care of Annabel Cottrell.   We discussed the clinical significance of diagnosis, goals of care and treatment plan in detail.   Please do not hesitate to reach out with any questions. Thank you.     -------------------------------------------------------------------------------------------------------------------------------  Carmen Dorsey MSN, APRN, FNP-C  [facility]  Division of Medical Oncology-  Breast   Collaborating Physician Dr. Nehemias Lopez   [facility] Breast Disease Team   [facility]  [id number] [place] [address]  Phone: [phone]  Fax: [phone]  Available via Secure Chat    Confidential Peer Review Document-  Privilege  Privileged Pursuant to [place] Revised Code Section 2305.24, .25, .251 & .252

## 2025-05-12 NOTE — PATIENT INSTRUCTIONS
April 2022 and July 2024 bone density were normal. Moving forward PCP will manage bone density monitoring      Completion Mammogram today, I will call you once resulted. Follow up in 1 year same day as follow up with Carmen ALFRED CNP with mammogram     I have updated Prescription today for 1 year Tamoxifen - planned 10 year course    hot flashes- Sent in rx today for Veozah- trial is 30 days with 2 refill- Please  let me know how this working     Radiation oncology follow up 9/11/25 Yesenia Stewart CNP- you may change this to Danyelle at [facility] location     PCP Dr Meng annually and as scheduled      Call with any questions, concerns or treatment intolerance prior to next office visit [phone].

## 2025-05-13 ENCOUNTER — TELEPHONE (OUTPATIENT)
Dept: HEMATOLOGY/ONCOLOGY | Facility: HOSPITAL | Age: 75
End: 2025-05-13
Payer: MEDICARE

## 2025-05-13 DIAGNOSIS — N95.1 MENOPAUSAL HOT FLUSHES: Primary | ICD-10-CM

## 2025-05-13 RX ORDER — FEZOLINETANT 45 MG/1
45 TABLET, FILM COATED ORAL DAILY
Qty: 30 TABLET | Refills: 2 | Status: SHIPPED | OUTPATIENT
Start: 2025-05-13 | End: 2025-05-13

## 2025-05-13 RX ORDER — FEZOLINETANT 45 MG/1
45 TABLET, FILM COATED ORAL DAILY
Qty: 30 TABLET | Refills: 2 | Status: SHIPPED | OUTPATIENT
Start: 2025-05-13 | End: 2025-06-13

## 2025-05-13 NOTE — TELEPHONE ENCOUNTER
Call to patient to review normal mammogram and follow up in 1 year. She understands we are waiting on prior auth on Veozah.

## 2025-05-14 ENCOUNTER — SPECIALTY PHARMACY (OUTPATIENT)
Dept: PHARMACY | Facility: CLINIC | Age: 75
End: 2025-05-14

## 2025-05-15 ENCOUNTER — APPOINTMENT (OUTPATIENT)
Dept: OBSTETRICS AND GYNECOLOGY | Facility: CLINIC | Age: 75
End: 2025-05-15
Payer: MEDICARE

## 2025-05-19 ENCOUNTER — TELEPHONE (OUTPATIENT)
Dept: PRIMARY CARE | Facility: CLINIC | Age: 75
End: 2025-05-19
Payer: MEDICARE

## 2025-05-19 DIAGNOSIS — N39.0 URINARY TRACT INFECTION WITHOUT HEMATURIA, SITE UNSPECIFIED: ICD-10-CM

## 2025-05-19 NOTE — TELEPHONE ENCOUNTER
She thinks she might have a UTI. She will leave a specimen tomorrow and if the sxs get really bas she will call me and have you pre-treat.

## 2025-05-20 ENCOUNTER — TELEPHONE (OUTPATIENT)
Dept: PRIMARY CARE | Facility: CLINIC | Age: 75
End: 2025-05-20

## 2025-05-20 ENCOUNTER — APPOINTMENT (OUTPATIENT)
Dept: OTOLARYNGOLOGY | Facility: CLINIC | Age: 75
End: 2025-05-20
Payer: MEDICARE

## 2025-05-20 ENCOUNTER — APPOINTMENT (OUTPATIENT)
Dept: AUDIOLOGY | Facility: CLINIC | Age: 75
End: 2025-05-20
Payer: MEDICARE

## 2025-05-20 ENCOUNTER — OFFICE VISIT (OUTPATIENT)
Dept: NEPHROLOGY | Facility: CLINIC | Age: 75
End: 2025-05-20
Payer: MEDICARE

## 2025-05-20 VITALS
WEIGHT: 155 LBS | SYSTOLIC BLOOD PRESSURE: 145 MMHG | TEMPERATURE: 97.2 F | HEART RATE: 87 BPM | DIASTOLIC BLOOD PRESSURE: 82 MMHG | OXYGEN SATURATION: 93 % | BODY MASS INDEX: 27.88 KG/M2

## 2025-05-20 DIAGNOSIS — R09.89 LABILE HYPERTENSION: Primary | ICD-10-CM

## 2025-05-20 DIAGNOSIS — N30.00 ACUTE CYSTITIS WITHOUT HEMATURIA: Primary | ICD-10-CM

## 2025-05-20 DIAGNOSIS — N30.90 CYSTITIS: ICD-10-CM

## 2025-05-20 DIAGNOSIS — J47.9 BRONCHIECTASIS, UNCOMPLICATED (MULTI): ICD-10-CM

## 2025-05-20 DIAGNOSIS — N18.31 STAGE 3A CHRONIC KIDNEY DISEASE (MULTI): Primary | ICD-10-CM

## 2025-05-20 DIAGNOSIS — I10 BENIGN ESSENTIAL HYPERTENSION: ICD-10-CM

## 2025-05-20 DIAGNOSIS — Q61.8 OTHER CYSTIC KIDNEY DISEASES: ICD-10-CM

## 2025-05-20 DIAGNOSIS — R03.0 ELEVATED BLOOD-PRESSURE READING, WITHOUT DIAGNOSIS OF HYPERTENSION: ICD-10-CM

## 2025-05-20 RX ORDER — NITROFURANTOIN 25; 75 MG/1; MG/1
100 CAPSULE ORAL 2 TIMES DAILY
Qty: 10 CAPSULE | Refills: 0 | Status: SHIPPED | OUTPATIENT
Start: 2025-05-20 | End: 2025-05-25

## 2025-05-20 NOTE — PROGRESS NOTES
Subjective   Annabel Cottrell is a 75 y.o. female who presented today to discuss her stage 3a chronic kidney disease in the setting of prior chemotherapy exposure and hypertension.  She had breast cancer in 2020 status post MRM, chemotherapy and radiation.  She has hypertension, had a thyroidectomy, appendectomy at 7 years old, hypothyroidism, Ménière's disease, and a DOC-BSO.  She looks good today and denies nausea, vomiting, chest pain, shortness of breath, or abdominal pain.  She does have episodes where the blood pressure is higher, she has a headache and diaphoresis, no palpitations.  When I saw her on November 13 her weight was 150 pounds, blood pressure 118/72 mmHg.  She tells me now that she is averaging 130-135 mmHg systolic but she has episodes when she is much higher.    ROS  As in Subjective, all other ROS are negative    Objective     Vital signs    Visit Vitals  /82 (BP Location: Left arm, Patient Position: Sitting, BP Cuff Size: Adult)   Pulse 87   Temp 36.2 °C (97.2 °F) (Temporal)      Vitals:    05/20/25 1542   Weight: 70.3 kg (155 lb)        Physical Exam  Constitutional:       Appearance: Normal appearance.   HENT:      Mouth/Throat:      Mouth: Mucous membranes are moist.   Eyes:      Extraocular Movements: Extraocular movements intact.      Pupils: Pupils are equal, round, and reactive to light.   Cardiovascular:      Rate and Rhythm: Regular rhythm.      Heart sounds: S1 normal and S2 normal.   Pulmonary:      Breath sounds: Normal breath sounds.   Abdominal:      Comments: Soft, NT/ND, no masses, normal bowel sounds   Genitourinary:     Comments: No manley  Musculoskeletal:      No synovitis  Edema:     Right lower leg: No edema.      Left lower leg: No edema.   Skin:     General: Skin is warm and dry.   Neurological:      General: No focal deficit present.      Mental Status: She is alert and oriented to person, place, and time.   Psychiatric:         Behavior: Behavior normal.   "    Meds  Current Medications[1]     Allergies  RX Allergies[2]     Results  Lab Results   Component Value Date    GLUCOSE 89 04/21/2025    GLUCOSE 94 03/20/2025     04/21/2025     03/20/2025    K 4.2 04/21/2025    K 4.4 03/20/2025     04/21/2025     03/20/2025    CO2 29 04/21/2025    CO2 30 03/20/2025    ANIONGAP 10 04/21/2025    ANIONGAP 10 03/13/2025    BUN 15 04/21/2025    BUN 18 03/20/2025    CREATININE 1.22 (H) 04/21/2025    CREATININE 1.30 (H) 03/20/2025    GFRF 42 (A) 04/24/2023    CALCIUM 8.8 04/21/2025    CALCIUM 9.6 03/20/2025    CALCIUM 9.6 03/20/2025    MG 2.21 10/30/2023     Lab Results   Component Value Date     (H) 03/20/2025    CALCIUM 8.8 04/21/2025    CALCIUM 9.6 03/20/2025    CALCIUM 9.6 03/20/2025     No results found for: \"ALBUR\", \"[id number]\"         @LABALLVALUEIP(CREATININE:*)@  @LABALLVALUEIP(NA:*)@    Imaging results  === 11/04/24 ===    US RENAL COMPLETE    - Impression -  2 small cystic lesions of the left kidney measuring up to 1.2 cm  which do not meet strict sonographic criteria for a simple cyst.  These appear similar to prior comparison imaging. Given the overall  appearance, correlation with renal mass protocol MRI is advised.    MACRO:  None    Signed by: Bang Hall 11/5/2024 8:04 PM  Dictation workstation:   [id number]     Assessment and Plan  Annabel Cottrell has stable stage 3a chronic kidney disease in the setting of the hypertension and prior chemotherapy exposure.  On April 21 creatinine was stable at 1.22 mg/dL, no acidosis, electrolytes look good.  Hemoglobin was slightly low at 11.9 g/dL, normal platelets.  She lacks urine microalbumin.  LDL cholesterol is typically at goal on lipid-lowering therapy.    I will see if Dr. Coulter can help order a 24-hour ambulatory blood pressure monitor.  I looked at her prior CT, unlikely of that she has a pheochromocytoma but I will check a plasma metanephrines.       Problem List Items Addressed This Visit  "    Benign essential hypertension    Relevant Orders    Metanephrines Plasma    Stage 3a chronic kidney disease (Multi) - Primary    Relevant Orders    Follow Up In Nephrology    Other cystic kidney diseases    Relevant Orders    MR kidney w and wo IV contrast      Luis Miguel Up MD             [1]    Current Outpatient Medications:   •  acitretin (Soriatane) 10 mg capsule, Take 2 capsules (20 mg) by mouth once daily., Disp: 180 capsule, Rfl: 0  •  calcium carbonate 600 mg calcium (1,500 mg) tablet, Take 2 tablets (3,000 mg) by mouth once daily., Disp: , Rfl:   •  cholecalciferol (Vitamin D-3) 125 MCG (5000 UT) capsule, Take 1 capsule (125 mcg) by mouth once daily., Disp: , Rfl:   •  clotrimazole-betamethasone (Lotrisone) cream, Apply 1 Application topically 2 times a day., Disp: 15 g, Rfl: 0  •  escitalopram (Lexapro) 5 mg tablet, Take 1 tablet (5 mg) by mouth once daily., Disp: 30 tablet, Rfl: 2  •  estrogens, conjugated, (Premarin) vaginal cream, Apply pea-sized amount to the vaginal opening 3 times weekly as directed, Disp: 30 g, Rfl: 3  •  ezetimibe (Zetia) 10 mg tablet, Take 1 tablet (10 mg) by mouth once daily., Disp: 90 tablet, Rfl: 3  •  fezolinetant (Veozah) 45 mg tablet, Take 45 mg by mouth once daily., Disp: 30 tablet, Rfl: 2  •  fluorouracil (Efudex) 5 % cream cream, Apply topically 2 times a day. Apply to the affected areas of the hands, left foot + lower leg, right foot + lower leg twice daily for a total of 2-3 weeks each. Treat each area separately., Disp: 40 g, Rfl: 3  •  levothyroxine (Synthroid, Levoxyl) 100 mcg tablet, Take 1 tablet (100 mcg) by mouth once daily. as directed, Disp: 90 tablet, Rfl: 3  •  magnesium gluconate (Magonate) 27.5 mg magne- sium (500 mg) tablet, Take 1 tablet (27.5 mg) by mouth once daily., Disp: , Rfl:   •  meclizine (Antivert) 25 mg tablet, Take 1 tablet (25 mg) by mouth 3 times a day as needed for dizziness., Disp: 30 tablet, Rfl: 0  •  nitrofurantoin,  macrocrystal-monohydrate, (Macrobid) 100 mg capsule, Take 1 capsule (100 mg) by mouth 2 times a day for 5 days., Disp: 10 capsule, Rfl: 0  •  pantoprazole (ProtoNix) 40 mg EC tablet, Take 1 tablet (40 mg) by mouth once daily. Do not crush, chew, or split., Disp: 90 tablet, Rfl: 3  •  rosuvastatin (Crestor) 40 mg tablet, Take 1 tablet (40 mg) by mouth once daily at bedtime., Disp: 90 tablet, Rfl: 3  •  tamoxifen (Nolvadex) 20 mg tablet, Take 1 tablet (20 mg total) by mouth once daily., Disp: 90 tablet, Rfl: 3  •  valsartan-hydrochlorothiazide (Diovan-HCT) 160-12.5 mg tablet, Take 1 tablet by mouth once daily., Disp: 90 tablet, Rfl: 3  •  venlafaxine XR (Effexor-XR) 37.5 mg 24 hr capsule, Take 1 capsule (37.5 mg) by mouth once daily. Do not crush or chew., Disp: 30 capsule, Rfl: 1  •  zinc gluconate 50 mg tablet, Take 1 tablet (50 mg) by mouth once daily., Disp: , Rfl: [2]  Allergies  Allergen Reactions   • Aloe Vera Latex Gloves [Gloves, Latex With Aloe Vera] Unknown     Pt is only allergic to LATEX   • Codeine Unknown   Patient was identified as a fall risk. Risk prevention instructions provided.

## 2025-05-20 NOTE — PATIENT INSTRUCTIONS

## 2025-05-22 ENCOUNTER — TELEPHONE (OUTPATIENT)
Dept: PRIMARY CARE | Facility: CLINIC | Age: 75
End: 2025-05-22
Payer: MEDICARE

## 2025-05-22 LAB
APPEARANCE UR: CLEAR
BACTERIA #/AREA URNS HPF: ABNORMAL /HPF
BACTERIA UR CULT: NORMAL
BILIRUB UR QL STRIP: NEGATIVE
COLOR UR: YELLOW
GLUCOSE UR QL STRIP: NEGATIVE
HGB UR QL STRIP: NEGATIVE
HYALINE CASTS #/AREA URNS LPF: ABNORMAL /LPF
KETONES UR QL STRIP: NEGATIVE
LEUKOCYTE ESTERASE UR QL STRIP: ABNORMAL
NITRITE UR QL STRIP: NEGATIVE
PH UR STRIP: 6.5 [PH] (ref 5–8)
PROT UR QL STRIP: NEGATIVE
RBC #/AREA URNS HPF: ABNORMAL /HPF
SERVICE CMNT-IMP: ABNORMAL
SP GR UR STRIP: 1.01 (ref 1–1.03)
SQUAMOUS #/AREA URNS HPF: ABNORMAL /HPF
WBC #/AREA URNS HPF: ABNORMAL /HPF

## 2025-05-22 RX ORDER — AMITRIPTYLINE HYDROCHLORIDE 10 MG/1
10 TABLET, FILM COATED ORAL NIGHTLY
Qty: 30 TABLET | Refills: 11 | Status: SHIPPED | OUTPATIENT
Start: 2025-05-22 | End: 2026-05-22

## 2025-05-22 NOTE — TELEPHONE ENCOUNTER
She is still having this sharp knife like pain multiple times a day what to do?  2.   Did you put in an order for a 24 hr BP monitor?   
Should she stop the antibiotic then? Anything else to do in the meantime?    I spoke with this patient by phone yes she should stop the antibiotics since there is no evidence of infection and the antibiotic did not make any change or difference in her symptoms.  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  She has been on this medication in the past several years ago I do not believe she had an issue we did discuss side effect profile  
Patient/Caregiver provided printed discharge information.

## 2025-05-23 ENCOUNTER — HOSPITAL ENCOUNTER (OUTPATIENT)
Dept: CARDIOLOGY | Facility: CLINIC | Age: 75
Discharge: HOME | End: 2025-05-23
Payer: MEDICARE

## 2025-05-23 DIAGNOSIS — R09.89 LABILE HYPERTENSION: ICD-10-CM

## 2025-05-23 DIAGNOSIS — R03.0 ELEVATED BLOOD-PRESSURE READING, WITHOUT DIAGNOSIS OF HYPERTENSION: ICD-10-CM

## 2025-05-23 PROCEDURE — 93786 AMBL BP MNTR W/SW REC ONLY: CPT

## 2025-05-29 ENCOUNTER — APPOINTMENT (OUTPATIENT)
Dept: OBSTETRICS AND GYNECOLOGY | Facility: CLINIC | Age: 75
End: 2025-05-29
Payer: MEDICARE

## 2025-06-04 NOTE — PROGRESS NOTES
Urogynecology  Provider:  Ghazal Jordan MD  [phone]    ASSESSMENT AND PLAN:   75 year old female with OAB, stage 3 CKD, and an enterovaginal fistula. Comorbidities include: HTN, GERD, stage 3a CKD, hx of left breast cancer in 2019 s/p adjuvant chemotherapy that was later switched to Tamoxifen due to intolerance to chemotherapy, and h/o endometrial cancer she is status post DOC/BSO 2008.     Diagnoses:  #1 Overactive bladder  #2 Enterovaginal fistula  #3 Chronic kidney disease, stage 3a    Plan:  1. Enterovaginal fistula, stage 3a CKD  - We discussed the etiology of enterovaginal fistula formation being related to childbirth injury or diverticulitis.   - Upon exam there are feces in the vaginal canal with an enterovaginal fistulous tract formation measuring 1cm that is visualized and palpated at the left corner of the vaginal apex. However, unable to palpate fistulous connection between the rectum and vagina upon AMELIA.   - Ordered creatinine and BUN labs.   - Ordered CT of the A/P with oral contrast only to visualize out the communication of the RVF.   - Reviewed RVF surgical intervention in coordination with a colorectal surgeon that would include laparoscopic excision the portion of the intestine that is adhered to the vagina and then reattach it.    > Recommended her to see Dr. Meredith Joiner in [facility].     2. OAB  - PVR = 24mL by bladder U/S.  - Discussed OAB treatment options such as lifestyle changes, PFPT, medications, PTNS, intradetrusor Botox injections, or SNM.   - We discussed that PFPT may help with bladder/pelvic floor restrengthening exercises with an overall goal to better control the bladder and improve urinary symptoms. PFPT includes attending sessions with a specialized licensed female pelvic floor physical therapist who does external with internal vaginal work to ensure she is doing the correct exercises to obtain the most benefit of physical therapy. We reviewed the importance of  continuing these home exercises to receive maximum benefit from PFPT. They also teach mind over bladder strategies/urge suppression techniques to reduce the intensity of the urge to void.   - We discussed that with starting an OAB medication the goal would be to allow the detrusor muscle around the bladder to relax and prevent the muscles from spasm/squeezing too frequently to allow the bladder to store more urine which reduces the urge, frequency, and incontinent episodes.   > She is interested in starting an OAB medication (Vesicare).   - Sent Rx of Solifenacin 5mg to her preferred pharmacy with instructions to take 1 pill po daily. This medication will help improve bladder compliance by decreasing intensity of the bladder spasms thus improving urinary urgency. Discussed the drug profile being an anticholinergic and possible medication side effects including urinary retention, dry mouth, dry eyes, and constipation. Of note, her creatinine is 1.22 and GFR is at 46 although this medication is metabolized by the liver the recommendation is to not increase Vesicare past 5mg.     Follow up in 8 weeks with Dr. Jordan for an OAB medication.    Scribe Attestation  By signing my name below, I, Alexis Melton, Scribe, attest that this documentation has been prepared under the direction and in the presence of Ghazal Jordan MD on 06/05/2025 at 4:42 PM.     Agree with above. I Dr. Jordan, personally performed the services described in the documentation which was scribed virtually and confirm it is both complete and accurate.  Ghazal Jordan MD      Problem List Items Addressed This Visit          Genitourinary and Reproductive    Stage 3a chronic kidney disease (Multi)    Relevant Orders    Creatinine    Blood Urea Nitrogen     Other Visit Diagnoses         Urinary disorder    -  Primary    Relevant Orders    Measure post void residual (Completed)    POCT UA Automated manually resulted (Completed)      Rectocele           Enterovaginal fistula        Relevant Orders    Referral to Colorectal Surgery    CT abdomen and pelvis w oral contrast only    Creatinine    Blood Urea Nitrogen      Overactive bladder        Relevant Medications    solifenacin (Vesicare) 5 mg tablet                I spent a total of eConsult Time: 55 minutes in face to face and non face to face time.        Ghazal Jordan MD        HISTORY OF PRESENT ILLNESS:   75 year old female presenting in consultation from Dr. Veliz for urinary incontinence and passing feces from the vagina.     Records Review:   - History of endometrial cancer she is status post DOC/BSO 2008.  - Breast cancer diagnosed in 2019 with positive axillary nodes neoadjuvant hormone therapy was recommended that she could not tolerated she had surgery then did have postoperative chemotherapy but was unable to complete the course because of side effects eventually found that she could tolerate Tamoxifen which she continues to take at present.     Vaginal and Bowel Symptoms:  - She reports leaking stool from her vagina when attempting to pass a BM.  - Her sxs worsened since February 2025 particularly after a trip to [place].  - Patient states she suspects that she has a fistulous connection between her vagina and bowels.  - No prior hx of diverticulitis.   - Her stools are generally soft/easy to pass.   - Denies constipation, pushing/straining to have a BM, or having a hard stool consistency (I.e. [place] scale #1-2 stools).      Urinary Symptoms:   - Patient experiences urinary urgency and frequency described as feeling the need to urinate immediately after eating or drinking.  - The patient reports difficulty holding urine, with occasional incontinence upon exiting the car.  - Drinks one cup of coffee daily and does not typically wake at night to urinate.  - She expresses an interest in starting an OAB medication to help improve her bladder control.  - Denies any recurrent UTI.      OBGYN History and Sexual Activity:   - , x2   - Previously had DOC/BSO in  due to endometrial cancer  -     Past Medical History:    Medical History[1]       Past Surgical History:     Surgical History[2]      Medications:     Prior to Admission medications    Medication Sig Start Date End Date Taking? Authorizing Provider   acitretin (Soriatane) 10 mg capsule Take 2 capsules (20 mg) by mouth once daily. 3/14/25   Christine Lynn MD   amitriptyline (Elavil) 10 mg tablet Take 1 tablet (10 mg) by mouth once daily at bedtime. 25  Kenzie Veliz MD   calcium carbonate 600 mg calcium (1,500 mg) tablet Take 2 tablets (3,000 mg) by mouth once daily. 7/3/19   Historical Provider, MD   cholecalciferol (Vitamin D-3) 125 MCG (5000 UT) capsule Take 1 capsule (125 mcg) by mouth once daily.    Historical Provider, MD   clotrimazole-betamethasone (Lotrisone) cream Apply 1 Application topically 2 times a day. 25   Kenzie Veliz MD   escitalopram (Lexapro) 5 mg tablet Take 1 tablet (5 mg) by mouth once daily. 3/31/25 6/29/25  Kenzie Veliz MD   ezetimibe (Zetia) 10 mg tablet Take 1 tablet (10 mg) by mouth once daily. 24  Marcos Coulter MD   fezolinetant (Veozah) 45 mg tablet Take 1 tablet (45 mg) by mouth once daily. 25  TIMA Chery-CNP   fluorouracil (Efudex) 5 % cream cream Apply topically 2 times a day. Apply to the affected areas of the hands, left foot + lower leg, right foot + lower leg twice daily for a total of 2-3 weeks each. Treat each area separately. 24   Christine Lynn MD   levothyroxine (Synthroid, Levoxyl) 100 mcg tablet Take 1 tablet (100 mcg) by mouth once daily. as directed 25  Kenzie Veliz MD   magnesium gluconate (Magonate) 27.5 mg magne- sium (500 mg) tablet Take 1 tablet (27.5 mg) by mouth once daily. 2/3/21   Historical Provider, MD   meclizine (Antivert) 25 mg tablet Take 1  tablet (25 mg) by mouth 3 times a day as needed for dizziness. 9/27/24   Kenzie Veliz MD   pantoprazole (ProtoNix) 40 mg EC tablet Take 1 tablet (40 mg) by mouth once daily. Do not crush, chew, or split. 11/26/24   Kenzie Veliz MD   rosuvastatin (Crestor) 40 mg tablet Take 1 tablet (40 mg) by mouth once daily at bedtime. 4/28/25   Kenzie Veliz MD   tamoxifen (Nolvadex) 20 mg tablet Take 1 tablet (20 mg total) by mouth once daily. 5/12/25 5/12/26  Carmen Dorsey APRN-CNP   valsartan-hydrochlorothiazide (Diovan-HCT) 160-12.5 mg tablet Take 1 tablet by mouth once daily. 2/8/25 2/8/26  Kenzie Veliz MD   venlafaxine XR (Effexor-XR) 37.5 mg 24 hr capsule Take 1 capsule (37.5 mg) by mouth once daily. Do not crush or chew. 4/24/25 6/23/25  Kenzie Veliz MD   zinc gluconate 50 mg tablet Take 1 tablet (50 mg) by mouth once daily.    Historical Provider, MD   estrogens, conjugated, (Premarin) vaginal cream Apply pea-sized amount to the vaginal opening 3 times weekly as directed 5/8/23 5/23/25  Kenzie Veliz MD        ROS  Review of Systems   Constitutional: Negative.    HENT: Negative.     Eyes: Negative.    Respiratory: Negative.     Cardiovascular: Negative.    Gastrointestinal: Negative.         Fecal loss from vagina   Endocrine: Negative.    Musculoskeletal: Negative.    Neurological: Negative.    Psychiatric/Behavioral: Negative.          Blood, Urine   Date Value Ref Range Status   04/21/2025 NEGATIVE NEGATIVE mg/dL Final     OCCULT BLOOD   Date Value Ref Range Status   05/20/2025 NEGATIVE NEGATIVE Final     NITRITE   Date Value Ref Range Status   05/20/2025 NEGATIVE NEGATIVE Final     Urobilinogen, Urine   Date Value Ref Range Status   04/21/2025 Normal Normal mg/dL Final         PHYSICAL EXAM:    There were no vitals taken for this visit.  No LMP recorded. Patient is postmenopausal.      Declines chaperone for physical exam.    PVR= 24mL by bladder  US    Well developed, well nourished, in no apparent distress.   Neurologic/Psychiatric:  Awake, Alert and Oriented times 3.  Affect normal.     GENITAL/URINARY:     External Genitalia:  The patient has normal appearing external genitalia, normal skenes and bartholins glands, and a normal hair distribution.  Her vulva is without lesions, erythema or discharge.  It is non-tender with appropriate sensation.     Urethral Meatus:  Size normal, Location normal, Lesions absent, Prolapse absent.    Urethra:  Fullness absent, Masses absent.    Bladder:  Fullness absent, Masses absent, Tenderness absent.    Vagina:  General appearance normal, Estrogen effect normal, Discharge absent, Lesions absent. There are feces in the vaginal canal with an enterovaginal fistulous tract formation measuring 1cm that is visualized and palpated at the left corner of the vaginal apex.     Cervix: surgically absent  Uterus:  surgically absent  Adnexa: normal, no masses or tenderness over the bilateral adnexa     Anus/Perineum:  Lesions absent and masses absent. Normal appearing anus and perineum.     Stress urinary incontinence not demonstrable.         POP-Q:  Stage: 1  Position: lithotomy  Moderate amount of robina stool in vagina at apex  Aa: -2       Ba:  C: -9   Gh:  Pb:  TVL: 9         Ap: -3 Bp:  D: X             Rectum: Unable to palpate fistulous communication between the vagina and rectum.       Data and DIAGNOSTIC STUDIES REVIEWED   Imaging  No results found.    Cardiology, Vascular, and Other Imaging  No other imaging results found for the past 7 days     Lab Results   Component Value Date    URINECULTURE SEE NOTE 05/20/2025      Lab Results   Component Value Date    GLUCOSE 89 04/21/2025    CALCIUM 8.8 04/21/2025     04/21/2025    K 4.2 04/21/2025    CO2 29 04/21/2025     04/21/2025    BUN 15 04/21/2025    CREATININE 1.22 (H) 04/21/2025     Lab Results   Component Value Date    WBC 6.4 04/21/2025    HGB 11.9 (L)  04/21/2025    HCT 37.7 04/21/2025    MCV 91 04/21/2025     04/21/2025          Ghazal Jordan MD             [1]   Past Medical History:  Diagnosis Date    Anxiety 04/04/2023    Arthritis     Basal cell carcinoma     Breast cancer 11 of 2021    Cataract     Chronic kidney disease     CKD (chronic kidney disease)     stage 3a    Colon polyp     Conjunctival hemorrhage, unspecified eye 11/14/2017    Subconjunctival hemorrhage    Depression     Disease of thyroid gland 15 yrs ago    Dizziness About 1 month ago    Endometrial cancer (Multi) 04/04/2023    Endometrial hyperplasia, unspecified     Endometrial hyperplasia    Failed hearing screening 2 yrs ago    GERD (gastroesophageal reflux disease)     HL (hearing loss) About 2 yrs ago    Hx antineoplastic chemo     Hyperlipidemia     Hypertension     Hypothyroidism     Intraductal papilloma of right breast 04/04/2023    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    Malignant neoplasm of uterine fundus    Other abnormal and inconclusive findings on diagnostic imaging of breast 12/19/2019    Abnormal MRI, breast    Personal history of irradiation     Personal history of malignant neoplasm of thyroid 12/19/2019    History of malignant neoplasm of thyroid    Personal history of other malignant neoplasm of skin     History of other malignant neoplasm of skin    Personal history of other medical treatment 09/08/2017    History of screening mammography    Personal history of other specified conditions 12/06/2019    History of abnormal mammogram    Pneumonitis due to inhalation of food and vomit (Multi) 10/06/2016    Aspiration pneumonia of both upper lobes due to gastric secretions    Thyroid nodule 1998    Vaginal atrophy 04/04/2023    Verruca    [2]   Past Surgical History:  Procedure Laterality Date    APPENDECTOMY  at age 7    BREAST BIOPSY Left 12/06/2019    BREAST BIOPSY Right 12/19/2019    BREAST BIOPSY Right 06/11/2020    Excisional    BREAST  LUMPECTOMY Left 06/11/2020    CATARACT EXTRACTION Left 04/25/2024    Dr. Banda    CATARACT EXTRACTION W/  INTRAOCULAR LENS IMPLANT Right 05/30/2024    Dr. Banda Aim: -2.00 to -2.50    COLOSTOMY  2/2024    HYSTERECTOMY  08/03/2017    Hysterectomy    LYMPH NODE BIOPSY      MOHS SURGERY      OTHER SURGICAL HISTORY  06/25/2020    Mastectomy partial    OTHER SURGICAL HISTORY  06/25/2020    Lopez lymph node biopsy procedure    OTHER SURGICAL HISTORY  07/06/2020    Axillary lymphadenectomy    SKIN BIOPSY      SKIN CANCER EXCISION      THYROID SURGERY  01/15/2014    Thyroid Surgery    TONSILLECTOMY      TUBAL LIGATION      WISDOM TOOTH EXTRACTION

## 2025-06-05 ENCOUNTER — APPOINTMENT (OUTPATIENT)
Dept: OBSTETRICS AND GYNECOLOGY | Facility: CLINIC | Age: 75
End: 2025-06-05
Payer: MEDICARE

## 2025-06-05 VITALS
BODY MASS INDEX: 29.3 KG/M2 | WEIGHT: 159.2 LBS | HEART RATE: 78 BPM | HEIGHT: 62 IN | DIASTOLIC BLOOD PRESSURE: 74 MMHG | SYSTOLIC BLOOD PRESSURE: 150 MMHG

## 2025-06-05 DIAGNOSIS — N32.81 OVERACTIVE BLADDER: ICD-10-CM

## 2025-06-05 DIAGNOSIS — N18.31 STAGE 3A CHRONIC KIDNEY DISEASE (MULTI): ICD-10-CM

## 2025-06-05 DIAGNOSIS — N39.9 URINARY DISORDER: Primary | ICD-10-CM

## 2025-06-05 DIAGNOSIS — N82.4 ENTEROVAGINAL FISTULA: ICD-10-CM

## 2025-06-05 DIAGNOSIS — N81.6 RECTOCELE: ICD-10-CM

## 2025-06-05 LAB
POC APPEARANCE, URINE: CLEAR
POC BILIRUBIN, URINE: NEGATIVE
POC BLOOD, URINE: NEGATIVE
POC COLOR, URINE: YELLOW
POC GLUCOSE, URINE: NEGATIVE MG/DL
POC KETONES, URINE: NEGATIVE MG/DL
POC LEUKOCYTES, URINE: NEGATIVE
POC NITRITE,URINE: NEGATIVE
POC PH, URINE: 6.5 PH
POC PROTEIN, URINE: NEGATIVE MG/DL
POC SPECIFIC GRAVITY, URINE: 1.01
POC UROBILINOGEN, URINE: 0.2 EU/DL

## 2025-06-05 PROCEDURE — 99214 OFFICE O/P EST MOD 30 MIN: CPT | Mod: 25 | Performed by: OBSTETRICS & GYNECOLOGY

## 2025-06-05 PROCEDURE — 81003 URINALYSIS AUTO W/O SCOPE: CPT | Performed by: OBSTETRICS & GYNECOLOGY

## 2025-06-05 PROCEDURE — 3078F DIAST BP <80 MM HG: CPT | Performed by: OBSTETRICS & GYNECOLOGY

## 2025-06-05 PROCEDURE — 1126F AMNT PAIN NOTED NONE PRSNT: CPT | Performed by: OBSTETRICS & GYNECOLOGY

## 2025-06-05 PROCEDURE — 51798 US URINE CAPACITY MEASURE: CPT | Performed by: OBSTETRICS & GYNECOLOGY

## 2025-06-05 PROCEDURE — 3077F SYST BP >= 140 MM HG: CPT | Performed by: OBSTETRICS & GYNECOLOGY

## 2025-06-05 PROCEDURE — 99204 OFFICE O/P NEW MOD 45 MIN: CPT | Performed by: OBSTETRICS & GYNECOLOGY

## 2025-06-05 PROCEDURE — 1159F MED LIST DOCD IN RCRD: CPT | Performed by: OBSTETRICS & GYNECOLOGY

## 2025-06-05 RX ORDER — SOLIFENACIN SUCCINATE 5 MG/1
5 TABLET, FILM COATED ORAL DAILY
Qty: 30 TABLET | Refills: 3 | Status: SHIPPED | OUTPATIENT
Start: 2025-06-05 | End: 2026-06-05

## 2025-06-05 ASSESSMENT — ENCOUNTER SYMPTOMS
GASTROINTESTINAL NEGATIVE: 1
CONSTITUTIONAL NEGATIVE: 1
MUSCULOSKELETAL NEGATIVE: 1
CARDIOVASCULAR NEGATIVE: 1
NEUROLOGICAL NEGATIVE: 1
ENDOCRINE NEGATIVE: 1
EYES NEGATIVE: 1
RESPIRATORY NEGATIVE: 1
PSYCHIATRIC NEGATIVE: 1

## 2025-06-05 ASSESSMENT — PAIN SCALES - GENERAL: PAINLEVEL_OUTOF10: 0-NO PAIN

## 2025-06-06 ENCOUNTER — TELEPHONE (OUTPATIENT)
Dept: SURGERY | Facility: CLINIC | Age: 75
End: 2025-06-06
Payer: MEDICARE

## 2025-06-06 LAB
BUN SERPL-MCNC: 22 MG/DL (ref 7–25)
CREAT SERPL-MCNC: 1.44 MG/DL (ref 0.6–1)
EGFRCR SERPLBLD CKD-EPI 2021: 38 ML/MIN/1.73M2

## 2025-06-06 NOTE — TELEPHONE ENCOUNTER
Attempted to reach patient.  Referred to Dr. Joiner for enterovagnial fistula. Referring provider: Dr. Jordan.  Message left for the patient that I was calling from Dr. Joiner's office.   We need to get you scheduled with Dr. Joiner.  I'd like to speak with you about minimizing your symptoms while you are waiting for your appointment.  I invited the patient to reach me at [phone].    When she returns the call I will let her know that Metamucil may bulk stools and minimize drainage.  Mely Farfan, RN

## 2025-06-09 ENCOUNTER — HOSPITAL ENCOUNTER (OUTPATIENT)
Dept: RADIOLOGY | Facility: HOSPITAL | Age: 75
Discharge: HOME | End: 2025-06-09
Payer: MEDICARE

## 2025-06-09 DIAGNOSIS — Q61.8 OTHER CYSTIC KIDNEY DISEASES: ICD-10-CM

## 2025-06-09 LAB
METANEPH FREE SERPL-MCNC: 37 PG/ML
METANEPHS SERPL-MCNC: 161 PG/ML
NORMETANEPH FREE SERPL-MCNC: 124 PG/ML

## 2025-06-09 PROCEDURE — A9575 INJ GADOTERATE MEGLUMI 0.1ML: HCPCS | Performed by: INTERNAL MEDICINE

## 2025-06-09 PROCEDURE — 74183 MRI ABD W/O CNTR FLWD CNTR: CPT

## 2025-06-09 PROCEDURE — 2550000001 HC RX 255 CONTRASTS: Performed by: INTERNAL MEDICINE

## 2025-06-09 RX ORDER — GADOTERATE MEGLUMINE 376.9 MG/ML
14 INJECTION INTRAVENOUS
Status: COMPLETED | OUTPATIENT
Start: 2025-06-09 | End: 2025-06-09

## 2025-06-09 RX ADMIN — GADOTERATE MEGLUMINE 14 ML: 376.9 INJECTION, SOLUTION INTRAVENOUS at 17:34

## 2025-06-10 ENCOUNTER — HOSPITAL ENCOUNTER (OUTPATIENT)
Dept: RADIOLOGY | Facility: CLINIC | Age: 75
Discharge: HOME | End: 2025-06-10
Payer: MEDICARE

## 2025-06-10 DIAGNOSIS — N82.4 ENTEROVAGINAL FISTULA: ICD-10-CM

## 2025-06-10 PROCEDURE — A9698 NON-RAD CONTRAST MATERIALNOC: HCPCS | Performed by: OBSTETRICS & GYNECOLOGY

## 2025-06-10 PROCEDURE — 2550000001 HC RX 255 CONTRASTS: Performed by: OBSTETRICS & GYNECOLOGY

## 2025-06-10 PROCEDURE — 74176 CT ABD & PELVIS W/O CONTRAST: CPT

## 2025-06-10 RX ADMIN — IOHEXOL 500 ML: 12 SOLUTION ORAL at 14:40

## 2025-06-11 ENCOUNTER — APPOINTMENT (OUTPATIENT)
Dept: OBSTETRICS AND GYNECOLOGY | Facility: CLINIC | Age: 75
End: 2025-06-11
Payer: MEDICARE

## 2025-06-14 DIAGNOSIS — L57.0 ACTINIC KERATOSIS: ICD-10-CM

## 2025-06-14 DIAGNOSIS — E78.1 HYPERTRIGLYCERIDEMIA: ICD-10-CM

## 2025-06-14 DIAGNOSIS — Z51.81 ENCOUNTER FOR THERAPEUTIC DRUG LEVEL MONITORING: ICD-10-CM

## 2025-06-14 DIAGNOSIS — F41.9 ANXIETY: ICD-10-CM

## 2025-06-14 DIAGNOSIS — Z85.828 PERSONAL HISTORY OF SKIN CANCER: ICD-10-CM

## 2025-06-14 RX ORDER — ESCITALOPRAM OXALATE 5 MG/1
5 TABLET ORAL DAILY
Qty: 30 TABLET | Refills: 2 | Status: SHIPPED | OUTPATIENT
Start: 2025-06-14

## 2025-06-24 ENCOUNTER — APPOINTMENT (OUTPATIENT)
Dept: OTOLARYNGOLOGY | Facility: CLINIC | Age: 75
End: 2025-06-24
Payer: MEDICARE

## 2025-06-26 LAB
ALBUMIN SERPL-MCNC: 4.1 G/DL (ref 3.6–5.1)
ALP SERPL-CCNC: 29 U/L (ref 37–153)
ALT SERPL-CCNC: 17 U/L (ref 6–29)
ANION GAP SERPL CALCULATED.4IONS-SCNC: 11 MMOL/L (CALC) (ref 7–17)
AST SERPL-CCNC: 17 U/L (ref 10–35)
BILIRUB SERPL-MCNC: 0.6 MG/DL (ref 0.2–1.2)
BUN SERPL-MCNC: 24 MG/DL (ref 7–25)
CALCIUM SERPL-MCNC: 8.8 MG/DL (ref 8.6–10.4)
CHLORIDE SERPL-SCNC: 102 MMOL/L (ref 98–110)
CHOLEST SERPL-MCNC: 129 MG/DL
CHOLEST/HDLC SERPL: 1.9 (CALC)
CO2 SERPL-SCNC: 26 MMOL/L (ref 20–32)
CREAT SERPL-MCNC: 1.42 MG/DL (ref 0.6–1)
EGFRCR SERPLBLD CKD-EPI 2021: 39 ML/MIN/1.73M2
ERYTHROCYTE [DISTWIDTH] IN BLOOD BY AUTOMATED COUNT: 13 % (ref 11–15)
GLUCOSE SERPL-MCNC: 88 MG/DL (ref 65–99)
HCT VFR BLD AUTO: 39.8 % (ref 35–45)
HDLC SERPL-MCNC: 69 MG/DL
HGB BLD-MCNC: 12.8 G/DL (ref 11.7–15.5)
LDLC SERPL CALC-MCNC: 42 MG/DL (CALC)
MCH RBC QN AUTO: 29.6 PG (ref 27–33)
MCHC RBC AUTO-ENTMCNC: 32.2 G/DL (ref 32–36)
MCV RBC AUTO: 92.1 FL (ref 80–100)
NONHDLC SERPL-MCNC: 60 MG/DL (CALC)
PLATELET # BLD AUTO: 209 THOUSAND/UL (ref 140–400)
PMV BLD REES-ECKER: 9.8 FL (ref 7.5–12.5)
POTASSIUM SERPL-SCNC: 3.8 MMOL/L (ref 3.5–5.3)
PROT SERPL-MCNC: 6.9 G/DL (ref 6.1–8.1)
RBC # BLD AUTO: 4.32 MILLION/UL (ref 3.8–5.1)
SODIUM SERPL-SCNC: 139 MMOL/L (ref 135–146)
TRIGL SERPL-MCNC: 93 MG/DL
WBC # BLD AUTO: 10.5 THOUSAND/UL (ref 3.8–10.8)

## 2025-06-26 ASSESSMENT — PATIENT GLOBAL ASSESSMENT (PGA): PATIENT GLOBAL ASSESSMENT: PATIENT GLOBAL ASSESSMENT:  2 - MILD

## 2025-06-26 ASSESSMENT — DERMATOLOGY PATIENT ASSESSMENT
WHERE ARE THESE NEW OR CHANGING LESIONS LOCATED: FACE, ABDOMEN
DO YOU HAVE ANY NEW OR CHANGING LESIONS: YES

## 2025-06-26 ASSESSMENT — DERMATOLOGY QUALITY OF LIFE (QOL) ASSESSMENT
WHAT SINGLE SKIN CONDITION LISTED BELOW IS THE PATIENT ANSWERING THE QUALITY-OF-LIFE ASSESSMENT QUESTIONS ABOUT: NONE OF THE ABOVE
DATE THE QUALITY-OF-LIFE ASSESSMENT WAS COMPLETED: 67389
WHAT SINGLE SKIN CONDITION LISTED BELOW IS THE PATIENT ANSWERING THE QUALITY-OF-LIFE ASSESSMENT QUESTIONS ABOUT: NONE OF THE ABOVE
RATE HOW EMOTIONALLY BOTHERED YOU ARE BY YOUR SKIN PROBLEM (FOR EXAMPLE, WORRY, EMBARRASSMENT, FRUSTRATION): 3
RATE HOW BOTHERED YOU ARE BY SYMPTOMS OF YOUR SKIN PROBLEM (EG, ITCHING, STINGING BURNING, HURTING OR SKIN IRRITATION): 5
RATE HOW BOTHERED YOU ARE BY EFFECTS OF YOUR SKIN PROBLEMS ON YOUR ACTIVITIES (EG, GOING OUT, ACCOMPLISHING WHAT YOU WANT, WORK ACTIVITIES OR YOUR RELATIONSHIPS WITH OTHERS): 0 - NEVER BOTHERED
RATE HOW BOTHERED YOU ARE BY EFFECTS OF YOUR SKIN PROBLEMS ON YOUR ACTIVITIES (EG, GOING OUT, ACCOMPLISHING WHAT YOU WANT, WORK ACTIVITIES OR YOUR RELATIONSHIPS WITH OTHERS): 0 - NEVER BOTHERED
ARE THERE EXCLUSIONS OR EXCEPTIONS FOR THE QUALITY OF LIFE ASSESSMENT: NO
RATE HOW EMOTIONALLY BOTHERED YOU ARE BY YOUR SKIN PROBLEM (FOR EXAMPLE, WORRY, EMBARRASSMENT, FRUSTRATION): 3

## 2025-07-03 ENCOUNTER — APPOINTMENT (OUTPATIENT)
Dept: PRIMARY CARE | Facility: CLINIC | Age: 75
End: 2025-07-03
Payer: MEDICARE

## 2025-07-03 ENCOUNTER — APPOINTMENT (OUTPATIENT)
Dept: DERMATOLOGY | Facility: CLINIC | Age: 75
End: 2025-07-03
Payer: MEDICARE

## 2025-07-03 DIAGNOSIS — L71.9 ROSACEA: Primary | ICD-10-CM

## 2025-07-03 DIAGNOSIS — L82.0 INFLAMED SEBORRHEIC KERATOSIS: ICD-10-CM

## 2025-07-03 DIAGNOSIS — L57.0 ACTINIC KERATOSIS: ICD-10-CM

## 2025-07-03 PROCEDURE — 1160F RVW MEDS BY RX/DR IN RCRD: CPT | Performed by: DERMATOLOGY

## 2025-07-03 PROCEDURE — 1159F MED LIST DOCD IN RCRD: CPT | Performed by: DERMATOLOGY

## 2025-07-03 PROCEDURE — 1036F TOBACCO NON-USER: CPT | Performed by: DERMATOLOGY

## 2025-07-03 PROCEDURE — 99214 OFFICE O/P EST MOD 30 MIN: CPT | Performed by: DERMATOLOGY

## 2025-07-03 RX ORDER — METRONIDAZOLE 7.5 MG/G
1 CREAM TOPICAL 2 TIMES DAILY
Qty: 45 G | Refills: 11 | Status: SHIPPED | OUTPATIENT
Start: 2025-07-03

## 2025-07-03 ASSESSMENT — ITCH NUMERIC RATING SCALE: HOW SEVERE IS YOUR ITCHING?: 0

## 2025-07-03 NOTE — Clinical Note
-Discussed nature of diagnosis and treatment options.     - acitretin started 11/2024, tolerating well  - Labs 6/26/25: lipid panel, CBC, CMP. Stable Cr 1.42. follows with nephrology  - we discussed increasing dose to 20mg PO daily but she chose to stay at 10mg daily    - plan to treat dorsum and sides of feet with 5-fluorouracil cream this summer, easier with sandals  - plan to treat lower legs this Fall/Winter

## 2025-07-03 NOTE — Clinical Note
Steroid rosacea  -Discussed nature of this chronic condition  - happened acutely after steroid injection that caused puffing of entire face  - these spots to not itch or hurt but are unsightly    -Recommend gentle skin care habits including gentle cleansers, non-comedogenic physical/mineral based sunscreen daily. Avoid exfoliation, wind and extreme temperatures when possible.   - start with metrocream  - can escalate to doxycycline if needed  - defer treatment of actinic keratoses today until next visit if still remain

## 2025-07-03 NOTE — Clinical Note
Erythematous scaly macule(s) and papules. There are still many on dorsum of feet and legs but they are not as thick

## 2025-07-03 NOTE — PROGRESS NOTES
Subjective     Annabel Cottrell is a 75 y.o. female who presents for the following: Rash (Face; treatment: otc products) and Suspicious Skin Lesion (Abdomen; history of Aks, non melanoma skin cancer). Last derm visit 3/4/25 for Full Skin Exam. History of multiple nonmelanoma skin cancers and actinic keratoses on acitretin    Intake Questions  Do you have any new or changing Lesions?: Yes  Where are these new or changing lesion(s) located?: face, abdomen  For patients coming in for a Follow-up Visit:  Have there been any changes in your health since your last visit?: (Patient-Rptd) (P) No  Are you an organ transplant recipient?: (Patient-Rptd) (P) No  Have you had or do you have a Staph Infection?: (Patient-Rptd) (P) No  Have you had or do you have Vacular Disease?: (Patient-Rptd) (P) No  Do you use sunscreen?: (Patient-Rptd) (P) Occasionally  Do you use a tanning bed?: (Patient-Rptd) (P) Yes, Previously  Are you trying to get pregnant?: (Patient-Rptd) (P) No  Are you on birth control?: (Patient-Rptd) (P) No  Do you have irregular menstrual cycles?: (Patient-Rptd) (P) No    Review of Systems:  No other skin or systemic complaints other than what is documented elsewhere in the note.    The following portions of the chart were reviewed this encounter and updated as appropriate:   Tobacco  Allergies  Meds  Problems  Med Hx  Surg Hx         Skin Cancer History  Biopsy Log Book  No skin cancers from Specimen Tracking.    Additional History      Specialty Problems          Dermatology Problems    History of nonmelanoma skin cancer    Lesion 1: Nodular and Infiltrative Basal Cell Carcinoma.  Deep margins involved.Year Diagnosed:  2022. August.Location:  Left Neck.Treatment(s): Mohs 10/2022 [facility]: [id number]    Lesion 2: Nodular Basal Cell Carcinoma.  Deep margins involved.Year Diagnosed:  2022. August.Location:  Left Upper Arm.Treatment(s): ED&C 10/2022 [facility] [id number]    Lesion 3: Superficial Basal  Cell Carcinoma.Year Diagnosed:  2022. August.Location:  Right Shin.Treatment(s): 5-FU, start 1/2023Pathology: [id number]    Lesion 4: Invasive squamous cell carcinoma.    Deep margins involved.Year Diagnosed:  2023. January.Location:  Right Anterior TrapeziusTreatment(s): **Pt cancelled surgery with derm surg, thought it was completely healed/resolved**Pathology:      7/2023. 2 skin lesions were biopsied from left lateral ankle superior that demonstrated squamous cell carcinoma in situ and from left lateral ankle inferior that demonstrated squamous cell carcinoma in situ. Treated with 5-fluorouracil cream but admitted to inconsistent use**             Objective   Well appearing patient in no apparent distress; mood and affect are within normal limits.    A focused skin examination was performed. All findings within normal limits unless otherwise noted below.    Assessment/Plan   Skin Exam  1. ROSACEA  Head - Anterior (Face)  Erythema and telangiectasia worse around actinic keratoses scattered on face  Steroid rosacea  -Discussed nature of this chronic condition  - happened acutely after steroid injection that caused puffing of entire face  - these spots to not itch or hurt but are unsightly    -Recommend gentle skin care habits including gentle cleansers, non-comedogenic physical/mineral based sunscreen daily. Avoid exfoliation, wind and extreme temperatures when possible.   - start with metrocream  - can escalate to doxycycline if needed  - defer treatment of actinic keratoses today until next visit if still remain  Related Medications  metroNIDAZOLE (Metrocream) 0.75 % cream  Apply 1 Application topically 2 times a day. To face for rosacea  2. ACTINIC KERATOSIS  Generalized  Erythematous scaly macule(s) and papules. There are still many on dorsum of feet and legs but they are not as thick  -Discussed nature of diagnosis and treatment options.     - acitretin started 11/2024, tolerating well  - Labs  6/26/25: lipid panel, CBC, CMP. Stable Cr 1.42. follows with nephrology  - we discussed increasing dose to 20mg PO daily but she chose to stay at 10mg daily    - plan to treat dorsum and sides of feet with 5-fluorouracil cream this summer, easier with sandals  - plan to treat lower legs this Fall/Winter    Related Procedures  Follow Up In Dermatology - Established Patient  Follow Up In Dermatology - Established Patient  Related Medications  fluorouracil (Efudex) 5 % cream cream  Apply topically 2 times a day. Apply to the affected areas of the hands, left foot + lower leg, right foot + lower leg twice daily for a total of 2-3 weeks each. Treat each area separately.  acitretin (Soriatane) 10 mg capsule  Take 2 capsules (20 mg) by mouth once daily.  3. INFLAMED SEBORRHEIC KERATOSIS (11)  Left Abdomen (side) - Upper, Left Flank (3), Left Lower Back, Mid Back, Right Flank (4), Right Lower Back  Stuck-on, waxy macule(s)/papule(s)/plaque(s) with comedo-like openings and milia-like cysts with surrounding erythema and crusting  -Patient requests cryotherapy today for these clinically inflamed lesions  -Possible side effects of liquid nitrogen treatment reviewed including formation of blisters, crusting, tenderness, scar, and discoloration which may be permanent.  Destr of lesion - Left Abdomen (side) - Upper, Left Flank (3), Left Lower Back, Mid Back, Right Flank (4), Right Lower Back  Complexity: simple    Destruction method: cryotherapy    Informed consent: discussed and consent obtained    Lesion destroyed using liquid nitrogen: Yes    Outcome: patient tolerated procedure well with no complications      Follow up 3 months Full Skin Exam

## 2025-07-11 ENCOUNTER — TELEPHONE (OUTPATIENT)
Dept: PRIMARY CARE | Facility: CLINIC | Age: 75
End: 2025-07-11
Payer: MEDICARE

## 2025-07-11 NOTE — TELEPHONE ENCOUNTER
She saw her dentist for jaw pain. He wants her to take Advil 200-400 every 6 hours but she thought she was told that her and Luis Miguel should only taker Tylenol and she can't remember why? Can she take this?

## 2025-07-14 ENCOUNTER — APPOINTMENT (OUTPATIENT)
Dept: OBSTETRICS AND GYNECOLOGY | Facility: CLINIC | Age: 75
End: 2025-07-14
Payer: MEDICARE

## 2025-07-14 ENCOUNTER — TELEPHONE (OUTPATIENT)
Dept: PRIMARY CARE | Facility: CLINIC | Age: 75
End: 2025-07-14

## 2025-07-17 NOTE — PROGRESS NOTES
Annabel Cottrell is a 75 year old female with history of HTN, GERD, stage 3a CKD, hx of left breast cancer in 2019 s/p adjuvant chemotherapy that was later switched to Tamoxifen due to intolerance to chemotherapy, and history of  endometrial cancer,  status post DOC/BSO 2008, referred by Ghazal Jordan MD for evaluation of vaginal fistula.    Presented to Dr. Jodran's office for evaluation of stool per vagina.  On exam stool was noted in the vaginal canal.  A CT was ordered for further work up.  In 2017 it was more like a period with intermittent drainage.     Bowels are more constipated.   Last UTI was in 2020.      Currently every time she has a BM it coming out from the vagina and the colon.  She uses a pantyliner and is changing that daily at the least   Normally she will have a BM in the AM and comes out as small pieces and pieces coming out the vagina as well.    She has had rectal bleeding and itching from hemorrhoids.        10/26/88109 CT Abd/Pelvis w contrast  1. Diverticulosis of the sigmoid. No diverticulitis.  2. Status post hysterectomy with mild thickening of the vaginal vault on the left side. The sigmoid loop abuts the vaginal vault at that site. Possibility of colovaginal fistula is high likely, given the presence of diverticulosis, however cannot be excluded on this exam. MR fistulogram is more sensitive modality for detection of fistula.  3. A 0.8 cm cyst in the posterior body of the pancreas, stable since 2008 and may represent side branch IPMN or simple cyst.  4. Stable subcentimeter hypodense lesions in the spleen, stable since 2009 may represent small cysts or hemangioma.    3/14/2024 Colonoscopy to cecum  The perianal and digital rectal exams were normal.   A 5 mm polyp was found in the proximal sigmoid colon.  The polyp was flat. The polyp was removed with a cold snare.  Resection and retrieval were complete.  Non-bleeding internal hemorrhoids were found during retroflexion. The  hemorrhoids were large and Grade 1 (internal hemorrhoids that do not prolapse).  Retroflexion in the right colon was performed.  The exam was otherwise without abnormality on direct and retroflexion views.  A. COLON - SIGMOID POLYP:     Tubular adenoma    6/12/2025 CT Abd/Pelvis with oral contrast  1. Colonic diverticulosis, without evidence of acute diverticulitis.  2. Single colonic diverticula within the distal sigmoid colon comes in close approximation to the left vaginal fornix. This may represent the patient is clinical enterovaginal fistula.  3. 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     Family History  Mom - CAD  Dad - CAD   MGF - Tongue cancer   Sister - CAD       Review of Systems  Constitutional: Negative for fever, chills, anorexia, weight loss, malaise     ENMT: Negative for nasal discharge, congestion, ear pain, mouth pain, throat pain     Respiratory: Negative for cough, hemoptysis, wheezing, shortness of breath     Cardiac: Negative for chest pain, dyspnea on exertion, orthopnea, palpitations, syncope, (+)HTN     Gastrointestinal: Negative for nausea, vomiting, diarrhea, constipation, abdominal pain, (+)VAGINAL FISTULA TO BOWEL, (+)GERD    Genitourinary: Negative for discharge, dysuria, flank pain, frequency, hematuria, (+)CKD, (+)OAB     Musculoskeletal: Negative for decreased ROM, pain, swelling, weakness     Neurological: Negative for dizziness, confusion, headache, seizures, syncope     Psychiatric: Negative for mood changes, anxiety, hallucinations, sleep changes, suicidal ideas     Skin: Negative for mass,  pain, itching, rash, ulcer     Endocrine: Negative for heat intolerance, cold intolerance, excessive sweating, polyuria, excess thirst     Hematologic/Lymph: Negative for anemia, bruising, easy bleeding, night sweats, petechiae, history of DVT/PE or cancer, (+)HX OF BREAST CANCER (+)HX OF ENDOMETRIAL CANCER     Allergic/Immunologic: Negative for anaphylaxis, itchy/ teary eyes, itching, sneezing, swelling    Physical Exam  Constitutional: Well developed, awake/alert/oriented x3, no distress, alert and cooperative             Eyes: Sclera anicteric, no conjunctival inflammation, conjugate gaze    ENMT: mucous membranes moist, no apparent injury,            Head/Neck: Neck supple, no apparent injury, No JVD, trachea midline, no bruits              Respiratory/Thorax: Patent airways, CTAB, normal breath sounds with good chest expansion, thorax symmetric         Cardiovascular: Regular, rate and rhythm, no murmurs, normal S1 and S2         Gastrointestinal: Nondistended, soft, non-tender, no rebound tenderness or guarding, no masses palpable, no organomegaly, +BS, no bruits, well healed lx scars   Extremities: normal extremities, no cyanosis edema, contusions or wounds, 2+ femoral pulses B/L              Neurological: alert and oriented x3, normal strength, Normal gait          Lymphatic: No palpable inguinal lymphadenopathy   Psychological: Appropriate mood and behavior         Skin: Warm and dry, no lesions, no rashes                  Impression:  Pt with colovaginal fistula s/p hysterectomy    Plan:    Sigmoid resection will likely need to repair the vaginal hole as she has chunks of stool coming out.   Discussed the risks of leakage and need for a permanent stoma, infection, bleeding, injury to other organs, UTI, wound infection, blood clots, hernia, need for further operation, need for blood products and anesthetic complications. Urinary retention.  Nerve dysfunction.

## 2025-07-23 ENCOUNTER — OFFICE VISIT (OUTPATIENT)
Dept: SURGERY | Facility: CLINIC | Age: 75
End: 2025-07-23
Payer: MEDICARE

## 2025-07-23 VITALS
WEIGHT: 154.6 LBS | DIASTOLIC BLOOD PRESSURE: 70 MMHG | BODY MASS INDEX: 28.45 KG/M2 | TEMPERATURE: 96.2 F | OXYGEN SATURATION: 96 % | HEIGHT: 62 IN | SYSTOLIC BLOOD PRESSURE: 105 MMHG | HEART RATE: 96 BPM

## 2025-07-23 DIAGNOSIS — N82.4 COLOVAGINAL FISTULA: Primary | ICD-10-CM

## 2025-07-23 PROCEDURE — 99214 OFFICE O/P EST MOD 30 MIN: CPT | Performed by: COLON & RECTAL SURGERY

## 2025-07-23 PROCEDURE — 3074F SYST BP LT 130 MM HG: CPT | Performed by: COLON & RECTAL SURGERY

## 2025-07-23 PROCEDURE — 1126F AMNT PAIN NOTED NONE PRSNT: CPT | Performed by: COLON & RECTAL SURGERY

## 2025-07-23 PROCEDURE — 3078F DIAST BP <80 MM HG: CPT | Performed by: COLON & RECTAL SURGERY

## 2025-07-23 PROCEDURE — 99204 OFFICE O/P NEW MOD 45 MIN: CPT | Performed by: COLON & RECTAL SURGERY

## 2025-07-23 ASSESSMENT — PAIN SCALES - GENERAL: PAINLEVEL_OUTOF10: 0-NO PAIN

## 2025-07-24 DIAGNOSIS — N82.4 COLOVAGINAL FISTULA: ICD-10-CM

## 2025-07-24 DIAGNOSIS — K57.92 DIVERTICULITIS: Primary | ICD-10-CM

## 2025-07-24 RX ORDER — METRONIDAZOLE 250 MG/1
TABLET ORAL
Qty: 3 TABLET | Refills: 0 | Status: SHIPPED | OUTPATIENT
Start: 2025-07-24

## 2025-07-24 RX ORDER — HEPARIN SODIUM 5000 [USP'U]/ML
5000 INJECTION, SOLUTION INTRAVENOUS; SUBCUTANEOUS ONCE
OUTPATIENT
Start: 2025-07-24 | End: 2025-07-24

## 2025-07-24 RX ORDER — METRONIDAZOLE 500 MG/100ML
500 INJECTION, SOLUTION INTRAVENOUS ONCE
OUTPATIENT
Start: 2025-07-24 | End: 2025-07-24

## 2025-07-24 RX ORDER — NEOMYCIN SULFATE 500 MG/1
TABLET ORAL
Qty: 6 TABLET | Refills: 0 | Status: SHIPPED | OUTPATIENT
Start: 2025-07-24

## 2025-07-24 RX ORDER — CHLORHEXIDINE GLUCONATE ORAL RINSE 1.2 MG/ML
SOLUTION DENTAL
Qty: 120 ML | Refills: 0 | Status: SHIPPED | OUTPATIENT
Start: 2025-07-24

## 2025-07-29 ENCOUNTER — TELEPHONE (OUTPATIENT)
Dept: PRIMARY CARE | Facility: CLINIC | Age: 75
End: 2025-07-29
Payer: MEDICARE

## 2025-07-29 DIAGNOSIS — F41.9 ANXIETY: Primary | ICD-10-CM

## 2025-07-29 RX ORDER — CLONAZEPAM 0.5 MG/1
0.5 TABLET ORAL DAILY PRN
Qty: 20 TABLET | Refills: 0 | Status: SHIPPED | OUTPATIENT
Start: 2025-07-29 | End: 2026-01-25

## 2025-07-29 NOTE — TELEPHONE ENCOUNTER
She has surgery scheduled with Dr. Joiner for Sept 15th. All her notes are in Epic. She is very anxious and had a very old prescription for Klonopin .5mg and she took half and it helped. Can you send her in another rx. [facility] on file

## 2025-08-01 ENCOUNTER — TELEPHONE (OUTPATIENT)
Dept: RADIATION ONCOLOGY | Facility: HOSPITAL | Age: 75
End: 2025-08-01
Payer: MEDICARE

## 2025-08-04 ENCOUNTER — HOSPITAL ENCOUNTER (OUTPATIENT)
Dept: RADIATION ONCOLOGY | Facility: HOSPITAL | Age: 75
Setting detail: RADIATION/ONCOLOGY SERIES
Discharge: HOME | End: 2025-08-04
Payer: MEDICARE

## 2025-08-04 VITALS
WEIGHT: 153.6 LBS | OXYGEN SATURATION: 96 % | BODY MASS INDEX: 28.09 KG/M2 | TEMPERATURE: 96.8 F | RESPIRATION RATE: 18 BRPM | DIASTOLIC BLOOD PRESSURE: 67 MMHG | HEART RATE: 88 BPM | SYSTOLIC BLOOD PRESSURE: 107 MMHG

## 2025-08-04 DIAGNOSIS — Z17.0 MALIGNANT NEOPLASM OF UPPER-OUTER QUADRANT OF LEFT BREAST IN FEMALE, ESTROGEN RECEPTOR POSITIVE: Primary | ICD-10-CM

## 2025-08-04 DIAGNOSIS — C50.412 MALIGNANT NEOPLASM OF UPPER-OUTER QUADRANT OF LEFT BREAST IN FEMALE, ESTROGEN RECEPTOR POSITIVE: Primary | ICD-10-CM

## 2025-08-04 PROCEDURE — 99213 OFFICE O/P EST LOW 20 MIN: CPT | Performed by: NURSE PRACTITIONER

## 2025-08-04 ASSESSMENT — ENCOUNTER SYMPTOMS
OCCASIONAL FEELINGS OF UNSTEADINESS: 0
CARDIOVASCULAR NEGATIVE: 1
HOT FLASHES: 0
NEUROLOGICAL NEGATIVE: 1
HEMATOLOGIC/LYMPHATIC NEGATIVE: 1
PSYCHIATRIC NEGATIVE: 1
RESPIRATORY NEGATIVE: 1
MUSCULOSKELETAL NEGATIVE: 1
GASTROINTESTINAL NEGATIVE: 1
LOSS OF SENSATION IN FEET: 0
CONSTITUTIONAL NEGATIVE: 1
DEPRESSION: 0

## 2025-08-04 ASSESSMENT — PATIENT HEALTH QUESTIONNAIRE - PHQ9
2. FEELING DOWN, DEPRESSED OR HOPELESS: NOT AT ALL
1. LITTLE INTEREST OR PLEASURE IN DOING THINGS: NOT AT ALL
SUM OF ALL RESPONSES TO PHQ9 QUESTIONS 1 AND 2: 0

## 2025-08-04 ASSESSMENT — COLUMBIA-SUICIDE SEVERITY RATING SCALE - C-SSRS
2. HAVE YOU ACTUALLY HAD ANY THOUGHTS OF KILLING YOURSELF?: NO
1. IN THE PAST MONTH, HAVE YOU WISHED YOU WERE DEAD OR WISHED YOU COULD GO TO SLEEP AND NOT WAKE UP?: NO
6. HAVE YOU EVER DONE ANYTHING, STARTED TO DO ANYTHING, OR PREPARED TO DO ANYTHING TO END YOUR LIFE?: NO

## 2025-08-04 ASSESSMENT — PAIN SCALES - GENERAL: PAINLEVEL_OUTOF10: 0-NO PAIN

## 2025-08-04 NOTE — PROGRESS NOTES
Radiation Oncology Follow-Up    Patient Name:  Annabel Cottrell  MRN:  [id number]  :  1950    Referring Provider: No ref. provider found  Primary Care Provider: Kenzie Veliz MD  Care Team: Patient Care Team:  Kenzie Veliz MD as PCP - General (Internal Medicine)  Marcos Coulter MD as Consulting Physician (Cardiology)    Date of Service: 2025     Cancer Staging:          Breast         AJCC Edition: 8th (AJCC), Diagnosis Date: Dec 2019, Stage(no match), ypT2 ypN3 cM0  G2     Treatment Synopsis:    75 year-old female with a clinical T2 N1 M0, invasive lobular carcinoma grade 2, LCIS, ER >95%, NH >95%, HER2 negative stage IIB,  the left breast status post neoadjuvant  anastrozole initiated 20.  She did not tolerate this (edema) and was switched to exemestane 20. On 3/16 she stopped exemestane for side effects. She took two doses of anastrozole  and  then stopped for edema. Letrozole initiated 20.  MammaPrint on core was Low Risk Luminal A.  - Progression on breast imaging 20. Staging was negative other than a single sclerotic rib lesion. Left partial mastectomy and SLN on 20 showed 3 cm residual tumor with multiple close and positive margins, 4/4 positive SLN. Right excision of  area of intraductal papilloma showed no additional findings. Reexcision left breast and ALND 20 showed LCIS in the breast and additional 4/4 positive ALN for total of 8/8 positive LNs.  - Postop chemotherapy with Docetaxel/Cytoxan x 4-6 cycles and radiation planned. First Docetaxel/Cytoxan 20. Stopped after 1st cycle because ofhospital admission for sepsis/febrile neutropenia despite pegfilgrastim. Repeat PET/CT negative 2020.  The lymph nodes appeared to be progressing and she underwent a left partial mastectomy with sentinel lymph node biopsy and completion axillary lymph node dissection. There were 8 of 8 positive lymph nodes. She had a palpable supraclavicular lymph node   which could not be biopsied.  - 2020 - 2020: Treated with radiation consisting of a total dose of 50Gy (2 Gy per fx) to the left breast, supraclavicular fossa, axilla, and internal mammary idris chain with boost of additional 10 Gy (for total of 62 Gy) to the palpable SC  lymph node and the tumor bed received an additional 12 gy (total of 62 Gy).   - Continued on letrozole endocrine therapy post treatment.   - Changed to tamoxifen  due to intolerable side effects  - Adjuvant Zometa initiated 2021    SUBJECTIVE  History of Present Illness:   Annabel Cottrell is here with her  today for routine radiation follow up/surveillance visit. She continues on tamoxifen and is doing well from a breast standpoint.   She denies any palpable findings in either breast; no lymphedema of left arm or difficulty with ROM. Says her energy is baseline and she is managing all of her ADLs. No headaches, fever, chills, cough, SOB, chest pain, GI complaints or bony pain. She was on Ozempic and lost 30 pounds. She began having significant side effects and had to discontinue use. Has been making an effort to watch what she is eating. Patient is being followed by Urogyn and Dr. Joiner for a vaginal fistula. She is scheduled for surgery in September and very nervous.  Mammogram in May 2025 without evidence of malignancy.      Review of Systems:    Review of Systems   Constitutional: Negative.    Respiratory: Negative.     Cardiovascular: Negative.    Gastrointestinal: Negative.    Endocrine: Negative for hot flashes.   Musculoskeletal: Negative.    Skin: Negative.    Neurological: Negative.    Hematological: Negative.    Psychiatric/Behavioral: Negative.     All other systems reviewed and are negative.    Performance Status:       KPS/ECO, Fully active, able to carry on all pre-disease performed without restriction (ECOG equivalent 0)    OBJECTIVE    Current Outpatient Medications:     acitretin (Soriatane) 10 mg  capsule, Take 2 capsules (20 mg) by mouth once daily., Disp: 180 capsule, Rfl: 0    amitriptyline (Elavil) 10 mg tablet, Take 1 tablet (10 mg) by mouth once daily at bedtime., Disp: 30 tablet, Rfl: 11    calcium carbonate 600 mg calcium (1,500 mg) tablet, Take 2 tablets (3,000 mg) by mouth once daily., Disp: , Rfl:     chlorhexidine (Peridex) 0.12 % solution, Rinse mouth with 15 ml after toothbrushing the night before surgery and on the morning of surgery.  Expectorate after rinsing.  Do not swallow., Disp: 120 mL, Rfl: 0    cholecalciferol (Vitamin D-3) 125 MCG (5000 UT) capsule, Take 1 capsule (125 mcg) by mouth once daily., Disp: , Rfl:     clonazePAM (KlonoPIN) 0.5 mg tablet, Take 1 tablet (0.5 mg) by mouth once daily as needed for anxiety., Disp: 20 tablet, Rfl: 0    clotrimazole-betamethasone (Lotrisone) cream, Apply 1 Application topically 2 times a day., Disp: 15 g, Rfl: 0    escitalopram (Lexapro) 5 mg tablet, TAKE 1 TABLET(5 MG) BY MOUTH DAILY, Disp: 30 tablet, Rfl: 2    ezetimibe (Zetia) 10 mg tablet, Take 1 tablet (10 mg) by mouth once daily., Disp: 90 tablet, Rfl: 3    fluorouracil (Efudex) 5 % cream cream, Apply topically 2 times a day. Apply to the affected areas of the hands, left foot + lower leg, right foot + lower leg twice daily for a total of 2-3 weeks each. Treat each area separately., Disp: 40 g, Rfl: 3    levothyroxine (Synthroid, Levoxyl) 100 mcg tablet, Take 1 tablet (100 mcg) by mouth once daily. as directed, Disp: 90 tablet, Rfl: 3    magnesium gluconate (Magonate) 27.5 mg magne- sium (500 mg) tablet, Take 1 tablet (27.5 mg) by mouth once daily., Disp: , Rfl:     meclizine (Antivert) 25 mg tablet, Take 1 tablet (25 mg) by mouth 3 times a day as needed for dizziness., Disp: 30 tablet, Rfl: 0    metroNIDAZOLE (Flagyl) 250 mg tablet, Take one tablet at 6p, 7p, and 11p the night before surgery., Disp: 3 tablet, Rfl: 0    metroNIDAZOLE (Metrocream) 0.75 % cream, Apply 1 Application topically 2  times a day. To face for rosacea, Disp: 45 g, Rfl: 11    neomycin (Mycifradin) 500 mg tablet, Take two tabs by mouth at 6p, 7pm, and 11p the night before surgery., Disp: 6 tablet, Rfl: 0    pantoprazole (ProtoNix) 40 mg EC tablet, Take 1 tablet (40 mg) by mouth once daily. Do not crush, chew, or split., Disp: 90 tablet, Rfl: 3    rosuvastatin (Crestor) 40 mg tablet, Take 1 tablet (40 mg) by mouth once daily at bedtime., Disp: 90 tablet, Rfl: 3    solifenacin (Vesicare) 5 mg tablet, Take 1 tablet (5 mg) by mouth once daily. Swallow tablet whole; do not crush, chew, or split., Disp: 30 tablet, Rfl: 3    tamoxifen (Nolvadex) 20 mg tablet, Take 1 tablet (20 mg total) by mouth once daily., Disp: 90 tablet, Rfl: 3    valsartan-hydrochlorothiazide (Diovan-HCT) 160-12.5 mg tablet, Take 1 tablet by mouth once daily., Disp: 90 tablet, Rfl: 3    zinc gluconate 50 mg tablet, Take 1 tablet by mouth once daily., Disp: , Rfl:     venlafaxine XR (Effexor-XR) 37.5 mg 24 hr capsule, Take 1 capsule (37.5 mg) by mouth once daily. Do not crush or chew., Disp: 30 capsule, Rfl: 1     Visit Vitals  /67   Pulse 88   Temp 36 °C (96.8 °F) (Skin)   Resp 18   Wt 69.7 kg (153 lb 9.6 oz)   SpO2 96%   BMI 28.09 kg/m²   OB Status Postmenopausal   Smoking Status Never   BSA 1.75 m²      Physical Exam  Vitals reviewed.   Constitutional:       Appearance: Normal appearance.   HENT:      Head: Normocephalic and atraumatic.      Nose: Nose normal.      Mouth/Throat:      Mouth: Mucous membranes are moist.      Pharynx: Oropharynx is clear.     Eyes:      Conjunctiva/sclera: Conjunctivae normal.      Pupils: Pupils are equal, round, and reactive to light.       Cardiovascular:      Rate and Rhythm: Normal rate and regular rhythm.      Heart sounds: Normal heart sounds.   Pulmonary:      Effort: Pulmonary effort is normal.      Breath sounds: Normal breath sounds.   Chest:   Breasts:     Right: No swelling, inverted nipple, mass, nipple discharge or  skin change.      Left: No swelling, inverted nipple, mass, nipple discharge or skin change.   Abdominal:      Palpations: Abdomen is soft.     Musculoskeletal:         General: No swelling. Normal range of motion.      Cervical back: Normal range of motion and neck supple.   Lymphadenopathy:      Cervical: No cervical adenopathy.      Upper Body:      Right upper body: No supraclavicular or axillary adenopathy.      Left upper body: No supraclavicular or axillary adenopathy.     Skin:     General: Skin is warm and dry.     Neurological:      General: No focal deficit present.      Mental Status: She is alert and oriented to person, place, and time.     Psychiatric:         Mood and Affect: Mood normal.         Behavior: Behavior normal.         RESULTS:  BI mammo bilateral screening tomosynthesis 05/12/2025    Narrative  Interpreted By:  Mitchell Granado,  STUDY:  BI MAMMO BILATERAL SCREENING TOMOSYNTHESIS;  5/12/2025 12:02 pm    ACCESSION NUMBER(S):  [id number]    ORDERING CLINICIAN:  RAQUEL ROJAS    INDICATION:  Screening. History of left lumpectomy with radiation therapy. History  of benign right excisional and core biopsies.    ,Z12.31 Encounter for screening mammogram for malignant neoplasm of  breast    COMPARISON:  05/10/2024, 04/24/2023, 04/20/2022    FINDINGS:  2D and tomosynthesis images were reviewed at 1 mm slice thickness.    Density:  The breasts are heterogeneously dense, which may obscure  small masses.    Stable postsurgical changes in the upper outer left breast at  posterior depth and left axilla. No suspicious masses or  calcifications are identified.    Impression  No mammographic evidence of malignancy. Stable left breast post  treatment changes.    BI-RADS CATEGORY:  BI-RADS Category:  2 Benign.  Recommendation:  Annual Screening.  Recommended Date:  1 Year.  Laterality:  Bilateral.        For any future breast imaging appointments, please call  [phone]  [phone]).      MACRO:  None    Signed by: Mitchell Granado 5/13/2025 3:39 PM  Dictation workstation:   [id number]     ASSESSMENT/PLAN:  75 y.o. female with stage IIB left breast cancer s/p neoadjuvant endocrine therapy with progression on imaging s/p breast conserving surgery, followed by adjuvant chemotherapy and radiation. Cosmesis is good. She will continue tamoxifen and follow up with Carmen Dorsey CNP in med onc. Patient is 4 years  and 9 months since her radiation.  She will follow up with rad onc on a PRN basis. Instructed to call with any questions or concerns.

## 2025-08-07 ENCOUNTER — APPOINTMENT (OUTPATIENT)
Dept: OBSTETRICS AND GYNECOLOGY | Facility: CLINIC | Age: 75
End: 2025-08-07
Payer: MEDICARE

## 2025-08-09 DIAGNOSIS — E78.00 HYPERCHOLESTEROLEMIA: ICD-10-CM

## 2025-08-11 ENCOUNTER — OFFICE VISIT (OUTPATIENT)
Dept: OBSTETRICS AND GYNECOLOGY | Facility: CLINIC | Age: 75
End: 2025-08-11
Payer: MEDICARE

## 2025-08-11 VITALS
BODY MASS INDEX: 28.26 KG/M2 | DIASTOLIC BLOOD PRESSURE: 74 MMHG | HEART RATE: 86 BPM | SYSTOLIC BLOOD PRESSURE: 117 MMHG | HEIGHT: 62 IN | WEIGHT: 153.6 LBS

## 2025-08-11 DIAGNOSIS — N95.2 VAGINAL ATROPHY: Primary | ICD-10-CM

## 2025-08-11 DIAGNOSIS — N39.0 ACUTE LOWER UTI: ICD-10-CM

## 2025-08-11 DIAGNOSIS — N39.9 URINARY DISORDER: ICD-10-CM

## 2025-08-11 LAB
POC APPEARANCE, URINE: ABNORMAL
POC BILIRUBIN, URINE: NEGATIVE
POC BLOOD, URINE: NEGATIVE
POC COLOR, URINE: YELLOW
POC GLUCOSE, URINE: NEGATIVE MG/DL
POC KETONES, URINE: NEGATIVE MG/DL
POC LEUKOCYTES, URINE: ABNORMAL
POC NITRITE,URINE: POSITIVE
POC PH, URINE: 7 PH
POC PROTEIN, URINE: NEGATIVE MG/DL
POC SPECIFIC GRAVITY, URINE: 1.01
POC UROBILINOGEN, URINE: 0.2 EU/DL

## 2025-08-11 PROCEDURE — 99213 OFFICE O/P EST LOW 20 MIN: CPT | Performed by: OBSTETRICS & GYNECOLOGY

## 2025-08-11 PROCEDURE — 99213 OFFICE O/P EST LOW 20 MIN: CPT | Mod: 25 | Performed by: OBSTETRICS & GYNECOLOGY

## 2025-08-11 PROCEDURE — 81003 URINALYSIS AUTO W/O SCOPE: CPT | Performed by: OBSTETRICS & GYNECOLOGY

## 2025-08-11 PROCEDURE — 1159F MED LIST DOCD IN RCRD: CPT | Performed by: OBSTETRICS & GYNECOLOGY

## 2025-08-11 PROCEDURE — 3074F SYST BP LT 130 MM HG: CPT | Performed by: OBSTETRICS & GYNECOLOGY

## 2025-08-11 PROCEDURE — 3078F DIAST BP <80 MM HG: CPT | Performed by: OBSTETRICS & GYNECOLOGY

## 2025-08-11 PROCEDURE — 51798 US URINE CAPACITY MEASURE: CPT | Performed by: OBSTETRICS & GYNECOLOGY

## 2025-08-11 PROCEDURE — 1125F AMNT PAIN NOTED PAIN PRSNT: CPT | Performed by: OBSTETRICS & GYNECOLOGY

## 2025-08-11 RX ORDER — NITROFURANTOIN 25; 75 MG/1; MG/1
100 CAPSULE ORAL EVERY 12 HOURS SCHEDULED
Qty: 14 CAPSULE | Refills: 0 | Status: SHIPPED | OUTPATIENT
Start: 2025-08-11 | End: 2025-08-18

## 2025-08-11 RX ORDER — ESTRADIOL 0.1 MG/G
1 CREAM VAGINAL 2 TIMES WEEKLY
Qty: 42.5 G | Refills: 3 | Status: SHIPPED | OUTPATIENT
Start: 2025-08-11 | End: 2027-03-29

## 2025-08-11 ASSESSMENT — ENCOUNTER SYMPTOMS
GASTROINTESTINAL NEGATIVE: 1
ENDOCRINE NEGATIVE: 1
FREQUENCY: 1
PSYCHIATRIC NEGATIVE: 1
MUSCULOSKELETAL NEGATIVE: 1
EYES NEGATIVE: 1
LOSS OF SENSATION IN FEET: 0
CARDIOVASCULAR NEGATIVE: 1
RESPIRATORY NEGATIVE: 1
DEPRESSION: 0
DYSURIA: 1
OCCASIONAL FEELINGS OF UNSTEADINESS: 0
NEUROLOGICAL NEGATIVE: 1
CONSTITUTIONAL NEGATIVE: 1

## 2025-08-11 ASSESSMENT — PAIN SCALES - GENERAL: PAINLEVEL_OUTOF10: 5

## 2025-08-12 DIAGNOSIS — N95.2 ATROPHIC VAGINITIS: Primary | ICD-10-CM

## 2025-08-12 RX ORDER — EZETIMIBE 10 MG/1
10 TABLET ORAL DAILY
Qty: 90 TABLET | Refills: 3 | Status: SHIPPED | OUTPATIENT
Start: 2025-08-12

## 2025-08-14 LAB — BACTERIA UR CULT: ABNORMAL

## 2025-08-19 ENCOUNTER — TELEPHONE (OUTPATIENT)
Dept: SURGERY | Facility: CLINIC | Age: 75
End: 2025-08-19
Payer: MEDICARE

## 2025-08-26 ENCOUNTER — APPOINTMENT (OUTPATIENT)
Dept: OPHTHALMOLOGY | Facility: CLINIC | Age: 75
End: 2025-08-26
Payer: MEDICARE

## 2025-08-28 ENCOUNTER — TELEPHONE (OUTPATIENT)
Dept: OBSTETRICS AND GYNECOLOGY | Facility: CLINIC | Age: 75
End: 2025-08-28
Payer: MEDICARE

## 2025-08-28 DIAGNOSIS — R39.9 UTI SYMPTOMS: ICD-10-CM

## 2025-08-30 LAB — BACTERIA UR CULT: ABNORMAL

## 2025-09-02 ENCOUNTER — TELEPHONE (OUTPATIENT)
Dept: OBSTETRICS AND GYNECOLOGY | Facility: CLINIC | Age: 75
End: 2025-09-02
Payer: MEDICARE

## 2025-09-02 DIAGNOSIS — N39.0 ACUTE LOWER UTI: ICD-10-CM

## 2025-09-03 ENCOUNTER — TELEPHONE (OUTPATIENT)
Dept: PRIMARY CARE | Facility: CLINIC | Age: 75
End: 2025-09-03
Payer: MEDICARE

## 2025-09-03 DIAGNOSIS — F41.9 ANXIETY: ICD-10-CM

## 2025-09-03 RX ORDER — SULFAMETHOXAZOLE AND TRIMETHOPRIM 800; 160 MG/1; MG/1
1 TABLET ORAL 2 TIMES DAILY
Qty: 10 TABLET | Refills: 0 | Status: SHIPPED | OUTPATIENT
Start: 2025-09-03 | End: 2025-09-08

## 2025-09-03 RX ORDER — ESCITALOPRAM OXALATE 10 MG/1
10 TABLET ORAL DAILY
Qty: 90 TABLET | Refills: 3 | Status: SHIPPED | OUTPATIENT
Start: 2025-09-03 | End: 2025-09-03 | Stop reason: SDUPTHER

## 2025-09-03 RX ORDER — ESCITALOPRAM OXALATE 10 MG/1
10 TABLET ORAL DAILY
Qty: 90 TABLET | Refills: 3 | Status: SHIPPED | OUTPATIENT
Start: 2025-09-03

## 2025-09-04 DIAGNOSIS — K21.9 GASTROESOPHAGEAL REFLUX DISEASE WITHOUT ESOPHAGITIS: ICD-10-CM

## 2025-09-04 RX ORDER — PANTOPRAZOLE SODIUM 40 MG/1
40 TABLET, DELAYED RELEASE ORAL DAILY
Qty: 90 TABLET | Refills: 3 | Status: SHIPPED | OUTPATIENT
Start: 2025-09-04

## 2025-09-11 ENCOUNTER — APPOINTMENT (OUTPATIENT)
Dept: RADIATION ONCOLOGY | Facility: HOSPITAL | Age: 75
End: 2025-09-11
Payer: MEDICARE

## 2025-09-15 ENCOUNTER — APPOINTMENT (OUTPATIENT)
Dept: RADIATION ONCOLOGY | Facility: HOSPITAL | Age: 75
End: 2025-09-15
Payer: MEDICARE

## 2025-10-14 ENCOUNTER — APPOINTMENT (OUTPATIENT)
Dept: DERMATOLOGY | Facility: CLINIC | Age: 75
End: 2025-10-14
Payer: MEDICARE

## 2025-11-24 ENCOUNTER — APPOINTMENT (OUTPATIENT)
Dept: NEPHROLOGY | Facility: CLINIC | Age: 75
End: 2025-11-24
Payer: MEDICARE

## 2025-12-18 ENCOUNTER — APPOINTMENT (OUTPATIENT)
Dept: DERMATOLOGY | Facility: CLINIC | Age: 75
End: 2025-12-18
Payer: MEDICARE

## 2026-02-10 ENCOUNTER — APPOINTMENT (OUTPATIENT)
Dept: OPHTHALMOLOGY | Facility: CLINIC | Age: 76
End: 2026-02-10
Payer: MEDICARE

## 2026-05-15 ENCOUNTER — APPOINTMENT (OUTPATIENT)
Dept: RADIOLOGY | Facility: HOSPITAL | Age: 76
End: 2026-05-15
Payer: MEDICARE

## 2026-05-15 ENCOUNTER — APPOINTMENT (OUTPATIENT)
Dept: HEMATOLOGY/ONCOLOGY | Facility: HOSPITAL | Age: 76
End: 2026-05-15
Payer: MEDICARE

## (undated) DEVICE — APPLICATOR, COTTON TIP, 6 IN, 2PK, STERILE

## (undated) DEVICE — SUTURE, VICRYL, 10-0, 12 IN, CS1606

## (undated) DEVICE — SOLUTION, OPHTHALMIC, TETRACAINE HCL 0.5%, 2 ML, VIAL